# Patient Record
Sex: MALE | Race: WHITE | NOT HISPANIC OR LATINO | URBAN - METROPOLITAN AREA
[De-identification: names, ages, dates, MRNs, and addresses within clinical notes are randomized per-mention and may not be internally consistent; named-entity substitution may affect disease eponyms.]

---

## 2019-10-17 ENCOUNTER — INPATIENT (INPATIENT)
Facility: HOSPITAL | Age: 59
LOS: 4 days | Discharge: HOME | End: 2019-10-22
Attending: INTERNAL MEDICINE | Admitting: INTERNAL MEDICINE
Payer: COMMERCIAL

## 2019-10-17 PROCEDURE — 93010 ELECTROCARDIOGRAM REPORT: CPT

## 2019-10-17 PROCEDURE — 99285 EMERGENCY DEPT VISIT HI MDM: CPT

## 2019-10-18 VITALS
HEART RATE: 90 BPM | OXYGEN SATURATION: 86 % | HEIGHT: 68 IN | RESPIRATION RATE: 22 BRPM | WEIGHT: 184.97 LBS | SYSTOLIC BLOOD PRESSURE: 131 MMHG | DIASTOLIC BLOOD PRESSURE: 94 MMHG | TEMPERATURE: 98 F

## 2019-10-18 DIAGNOSIS — Z29.9 ENCOUNTER FOR PROPHYLACTIC MEASURES, UNSPECIFIED: ICD-10-CM

## 2019-10-18 DIAGNOSIS — R07.9 CHEST PAIN, UNSPECIFIED: ICD-10-CM

## 2019-10-18 DIAGNOSIS — E11.9 TYPE 2 DIABETES MELLITUS WITHOUT COMPLICATIONS: ICD-10-CM

## 2019-10-18 DIAGNOSIS — Z72.0 TOBACCO USE: ICD-10-CM

## 2019-10-18 DIAGNOSIS — J44.1 CHRONIC OBSTRUCTIVE PULMONARY DISEASE WITH (ACUTE) EXACERBATION: ICD-10-CM

## 2019-10-18 DIAGNOSIS — Z98.890 OTHER SPECIFIED POSTPROCEDURAL STATES: Chronic | ICD-10-CM

## 2019-10-18 LAB
ALBUMIN SERPL ELPH-MCNC: 3 G/DL — LOW (ref 3.3–5)
ALP SERPL-CCNC: 54 U/L — SIGNIFICANT CHANGE UP (ref 40–120)
ALT FLD-CCNC: 39 U/L — SIGNIFICANT CHANGE UP (ref 12–78)
ANION GAP SERPL CALC-SCNC: 5 MMOL/L — SIGNIFICANT CHANGE UP (ref 5–17)
AST SERPL-CCNC: 22 U/L — SIGNIFICANT CHANGE UP (ref 15–37)
BASOPHILS # BLD AUTO: 0.04 K/UL — SIGNIFICANT CHANGE UP (ref 0–0.2)
BASOPHILS NFR BLD AUTO: 0.4 % — SIGNIFICANT CHANGE UP (ref 0–2)
BILIRUB SERPL-MCNC: 0.2 MG/DL — SIGNIFICANT CHANGE UP (ref 0.2–1.2)
BUN SERPL-MCNC: 19 MG/DL — SIGNIFICANT CHANGE UP (ref 7–23)
CALCIUM SERPL-MCNC: 8.1 MG/DL — LOW (ref 8.5–10.1)
CHLORIDE SERPL-SCNC: 106 MMOL/L — SIGNIFICANT CHANGE UP (ref 96–108)
CK MB BLD-MCNC: 2.7 % — SIGNIFICANT CHANGE UP (ref 0–3.5)
CK MB CFR SERPL CALC: 1.2 NG/ML — SIGNIFICANT CHANGE UP (ref 0.5–3.6)
CK SERPL-CCNC: 44 U/L — SIGNIFICANT CHANGE UP (ref 26–308)
CO2 SERPL-SCNC: 31 MMOL/L — SIGNIFICANT CHANGE UP (ref 22–31)
CREAT SERPL-MCNC: 0.44 MG/DL — LOW (ref 0.5–1.3)
D DIMER BLD IA.RAPID-MCNC: <150 NG/ML DDU — SIGNIFICANT CHANGE UP
EOSINOPHIL # BLD AUTO: 0.07 K/UL — SIGNIFICANT CHANGE UP (ref 0–0.5)
EOSINOPHIL NFR BLD AUTO: 0.7 % — SIGNIFICANT CHANGE UP (ref 0–6)
GLUCOSE BLDC GLUCOMTR-MCNC: 255 MG/DL — HIGH (ref 70–99)
GLUCOSE BLDC GLUCOMTR-MCNC: 277 MG/DL — HIGH (ref 70–99)
GLUCOSE BLDC GLUCOMTR-MCNC: 278 MG/DL — HIGH (ref 70–99)
GLUCOSE BLDC GLUCOMTR-MCNC: 371 MG/DL — HIGH (ref 70–99)
GLUCOSE SERPL-MCNC: 234 MG/DL — HIGH (ref 70–99)
HCT VFR BLD CALC: 56.8 % — HIGH (ref 39–50)
HGB BLD-MCNC: 17.9 G/DL — HIGH (ref 13–17)
IMM GRANULOCYTES NFR BLD AUTO: 0.4 % — SIGNIFICANT CHANGE UP (ref 0–1.5)
LACTATE SERPL-SCNC: 1.1 MMOL/L — SIGNIFICANT CHANGE UP (ref 0.7–2)
LYMPHOCYTES # BLD AUTO: 19.5 % — SIGNIFICANT CHANGE UP (ref 13–44)
LYMPHOCYTES # BLD AUTO: 2.04 K/UL — SIGNIFICANT CHANGE UP (ref 1–3.3)
MCHC RBC-ENTMCNC: 31.5 GM/DL — LOW (ref 32–36)
MCHC RBC-ENTMCNC: 31.5 PG — SIGNIFICANT CHANGE UP (ref 27–34)
MCV RBC AUTO: 100 FL — SIGNIFICANT CHANGE UP (ref 80–100)
MONOCYTES # BLD AUTO: 0.91 K/UL — HIGH (ref 0–0.9)
MONOCYTES NFR BLD AUTO: 8.7 % — SIGNIFICANT CHANGE UP (ref 2–14)
NEUTROPHILS # BLD AUTO: 7.34 K/UL — SIGNIFICANT CHANGE UP (ref 1.8–7.4)
NEUTROPHILS NFR BLD AUTO: 70.3 % — SIGNIFICANT CHANGE UP (ref 43–77)
NRBC # BLD: 0 /100 WBCS — SIGNIFICANT CHANGE UP (ref 0–0)
NT-PROBNP SERPL-SCNC: 152 PG/ML — HIGH (ref 0–125)
PLATELET # BLD AUTO: 143 K/UL — LOW (ref 150–400)
POTASSIUM SERPL-MCNC: 4.2 MMOL/L — SIGNIFICANT CHANGE UP (ref 3.5–5.3)
POTASSIUM SERPL-SCNC: 4.2 MMOL/L — SIGNIFICANT CHANGE UP (ref 3.5–5.3)
PROT SERPL-MCNC: 6.9 GM/DL — SIGNIFICANT CHANGE UP (ref 6–8.3)
RBC # BLD: 5.68 M/UL — SIGNIFICANT CHANGE UP (ref 4.2–5.8)
RBC # FLD: 13.5 % — SIGNIFICANT CHANGE UP (ref 10.3–14.5)
SODIUM SERPL-SCNC: 142 MMOL/L — SIGNIFICANT CHANGE UP (ref 135–145)
TROPONIN I SERPL-MCNC: <.015 NG/ML — SIGNIFICANT CHANGE UP (ref 0.01–0.04)
TROPONIN I SERPL-MCNC: <.015 NG/ML — SIGNIFICANT CHANGE UP (ref 0.01–0.04)
WBC # BLD: 10.44 K/UL — SIGNIFICANT CHANGE UP (ref 3.8–10.5)
WBC # FLD AUTO: 10.44 K/UL — SIGNIFICANT CHANGE UP (ref 3.8–10.5)

## 2019-10-18 PROCEDURE — 93970 EXTREMITY STUDY: CPT | Mod: 26

## 2019-10-18 PROCEDURE — 99222 1ST HOSP IP/OBS MODERATE 55: CPT

## 2019-10-18 PROCEDURE — 71045 X-RAY EXAM CHEST 1 VIEW: CPT | Mod: 26

## 2019-10-18 RX ORDER — DEXTROSE 50 % IN WATER 50 %
25 SYRINGE (ML) INTRAVENOUS ONCE
Refills: 0 | Status: DISCONTINUED | OUTPATIENT
Start: 2019-10-18 | End: 2019-10-22

## 2019-10-18 RX ORDER — DEXTROSE 50 % IN WATER 50 %
15 SYRINGE (ML) INTRAVENOUS ONCE
Refills: 0 | Status: DISCONTINUED | OUTPATIENT
Start: 2019-10-18 | End: 2019-10-22

## 2019-10-18 RX ORDER — DEXTROSE 50 % IN WATER 50 %
12.5 SYRINGE (ML) INTRAVENOUS ONCE
Refills: 0 | Status: DISCONTINUED | OUTPATIENT
Start: 2019-10-18 | End: 2019-10-22

## 2019-10-18 RX ORDER — INSULIN LISPRO 100/ML
VIAL (ML) SUBCUTANEOUS AT BEDTIME
Refills: 0 | Status: DISCONTINUED | OUTPATIENT
Start: 2019-10-18 | End: 2019-10-22

## 2019-10-18 RX ORDER — SODIUM CHLORIDE 9 MG/ML
1000 INJECTION, SOLUTION INTRAVENOUS
Refills: 0 | Status: DISCONTINUED | OUTPATIENT
Start: 2019-10-18 | End: 2019-10-22

## 2019-10-18 RX ORDER — INFLUENZA VIRUS VACCINE 15; 15; 15; 15 UG/.5ML; UG/.5ML; UG/.5ML; UG/.5ML
0.5 SUSPENSION INTRAMUSCULAR ONCE
Refills: 0 | Status: COMPLETED | OUTPATIENT
Start: 2019-10-18 | End: 2019-10-18

## 2019-10-18 RX ORDER — ALBUTEROL 90 UG/1
1 AEROSOL, METERED ORAL EVERY 4 HOURS
Refills: 0 | Status: DISCONTINUED | OUTPATIENT
Start: 2019-10-18 | End: 2019-10-22

## 2019-10-18 RX ORDER — ASPIRIN/CALCIUM CARB/MAGNESIUM 324 MG
81 TABLET ORAL DAILY
Refills: 0 | Status: DISCONTINUED | OUTPATIENT
Start: 2019-10-19 | End: 2019-10-22

## 2019-10-18 RX ORDER — NITROGLYCERIN 6.5 MG
0.4 CAPSULE, EXTENDED RELEASE ORAL
Refills: 0 | Status: DISCONTINUED | OUTPATIENT
Start: 2019-10-18 | End: 2019-10-22

## 2019-10-18 RX ORDER — NICOTINE POLACRILEX 2 MG
1 GUM BUCCAL DAILY
Refills: 0 | Status: DISCONTINUED | OUTPATIENT
Start: 2019-10-18 | End: 2019-10-22

## 2019-10-18 RX ORDER — INSULIN LISPRO 100/ML
VIAL (ML) SUBCUTANEOUS
Refills: 0 | Status: DISCONTINUED | OUTPATIENT
Start: 2019-10-18 | End: 2019-10-22

## 2019-10-18 RX ORDER — IPRATROPIUM/ALBUTEROL SULFATE 18-103MCG
3 AEROSOL WITH ADAPTER (GRAM) INHALATION EVERY 6 HOURS
Refills: 0 | Status: DISCONTINUED | OUTPATIENT
Start: 2019-10-18 | End: 2019-10-21

## 2019-10-18 RX ORDER — ASPIRIN/CALCIUM CARB/MAGNESIUM 324 MG
325 TABLET ORAL ONCE
Refills: 0 | Status: COMPLETED | OUTPATIENT
Start: 2019-10-18 | End: 2019-10-18

## 2019-10-18 RX ORDER — GLUCAGON INJECTION, SOLUTION 0.5 MG/.1ML
1 INJECTION, SOLUTION SUBCUTANEOUS ONCE
Refills: 0 | Status: DISCONTINUED | OUTPATIENT
Start: 2019-10-18 | End: 2019-10-22

## 2019-10-18 RX ORDER — ENOXAPARIN SODIUM 100 MG/ML
40 INJECTION SUBCUTANEOUS DAILY
Refills: 0 | Status: DISCONTINUED | OUTPATIENT
Start: 2019-10-18 | End: 2019-10-22

## 2019-10-18 RX ORDER — TIOTROPIUM BROMIDE 18 UG/1
1 CAPSULE ORAL; RESPIRATORY (INHALATION) DAILY
Refills: 0 | Status: DISCONTINUED | OUTPATIENT
Start: 2019-10-18 | End: 2019-10-22

## 2019-10-18 RX ORDER — IPRATROPIUM/ALBUTEROL SULFATE 18-103MCG
3 AEROSOL WITH ADAPTER (GRAM) INHALATION ONCE
Refills: 0 | Status: COMPLETED | OUTPATIENT
Start: 2019-10-18 | End: 2019-10-18

## 2019-10-18 RX ADMIN — Medication 3: at 08:18

## 2019-10-18 RX ADMIN — Medication 125 MILLIGRAM(S): at 01:00

## 2019-10-18 RX ADMIN — Medication 5: at 11:16

## 2019-10-18 RX ADMIN — Medication 3 MILLILITER(S): at 23:12

## 2019-10-18 RX ADMIN — Medication 3 MILLILITER(S): at 01:00

## 2019-10-18 RX ADMIN — Medication 40 MILLIGRAM(S): at 21:54

## 2019-10-18 RX ADMIN — Medication 1 PATCH: at 19:23

## 2019-10-18 RX ADMIN — Medication 40 MILLIGRAM(S): at 14:42

## 2019-10-18 RX ADMIN — Medication 3 MILLILITER(S): at 05:06

## 2019-10-18 RX ADMIN — Medication 1 PATCH: at 11:20

## 2019-10-18 RX ADMIN — Medication 3: at 17:08

## 2019-10-18 RX ADMIN — ENOXAPARIN SODIUM 40 MILLIGRAM(S): 100 INJECTION SUBCUTANEOUS at 11:17

## 2019-10-18 RX ADMIN — Medication 1: at 22:24

## 2019-10-18 RX ADMIN — Medication 3 MILLILITER(S): at 12:22

## 2019-10-18 RX ADMIN — Medication 325 MILLIGRAM(S): at 05:23

## 2019-10-18 RX ADMIN — Medication 40 MILLIGRAM(S): at 05:24

## 2019-10-18 RX ADMIN — Medication 3 MILLILITER(S): at 17:25

## 2019-10-18 NOTE — ED ADULT TRIAGE NOTE - CHIEF COMPLAINT QUOTE
Patient reports midsternal chest pain "feels like something is sitting on my chest " since Report received from RN at 7pm. Assessment available on [facility]. will continue to monitor denies SOB hx DM Patient reports midsternal chest pain "feels like something is sitting on my chest " since 7. + tobacco smoker. Denies SOB

## 2019-10-18 NOTE — H&P ADULT - PROBLEM SELECTOR PLAN 2
Solumedrol 40mg IVPB Q8hrs,   Duonebs q6, spiriva, ventolin PRN  Pulm eval by Dr Talavera - likely will need PFTs

## 2019-10-18 NOTE — H&P ADULT - HISTORY OF PRESENT ILLNESS
Patient is a 58 YO M with a PMH of DM and tobacco abuse who presents to ED complaining of chest tightness earlier today.  Patient reports he was watching tv after dinner when he developed substernal chest pain 8/10, nonradational, that lasted about 15-20 minutes and resolved without intervention.  Patient denies any other associated symptoms.  Called EMS and was found to be hypoxic.  Currently, patient SpO2 88% on 3L O2 via NC.  Patient reports smoking for over 30 years, reports he is currently smoking 2 PPD. Denies alcohol and recreational drugs.  NKDA.  Patient denies history of headache, fever, nausea, vomiting, palpitations, abdominal pain, constipation, diarrhea, dysuria, hematuria, melena, or hematochezia.

## 2019-10-18 NOTE — H&P ADULT - PROBLEM SELECTOR PLAN 1
Oxygen 2L by nasal cannula PRN  Nitroglycerine 0.4mg po q5min x 3 doses maximum PRN for recurrent chest pain  ASA 325mg po STAT then 81 mg po qd Troponin level x 2 q 3 hrs

## 2019-10-18 NOTE — H&P ADULT - NSHPLABSRESULTS_GEN_ALL_CORE
17.9   10.44 )-----------( 143      ( 18 Oct 2019 01:03 )             56.8     10-18    142  |  106  |  19  ----------------------------<  234<H>  4.2   |  31  |  0.44<L>    Ca    8.1<L>      18 Oct 2019 01:03    TPro  6.9  /  Alb  3.0<L>  /  TBili  0.2  /  DBili  x   /  AST  22  /  ALT  39  /  AlkPhos  54  10-18      LIVER FUNCTIONS - ( 18 Oct 2019 01:03 )  Alb: 3.0 g/dL / Pro: 6.9 gm/dL / ALK PHOS: 54 U/L / ALT: 39 U/L / AST: 22 U/L / GGT: x               Home Medications:  glyburide-metformin 5 mg-500 mg oral tablet: 1 tab(s) orally 2 times a day (18 Oct 2019 00:53)

## 2019-10-18 NOTE — ED PROVIDER NOTE - CLINICAL SUMMARY MEDICAL DECISION MAKING FREE TEXT BOX
Pt with COPD exacerbation - treated initially in ED - with continued hypoxia despite NC to 91% - will admit for further care with Dr. Paul in AM - tonight Dr. Cardona will admit.

## 2019-10-18 NOTE — ED ADULT NURSE NOTE - OBJECTIVE STATEMENT
pt with sudden onset pressure to chest while watching TV. lasted 15-20 minutes. did not take any medications. pt denied ever having shortness of breath.

## 2019-10-18 NOTE — CHART NOTE - NSCHARTNOTEFT_GEN_A_CORE
on 10/18/19 went to see  pt and discovered that the  patient belongs to dr. robles and that dr. schulz had already seen the patient on 10/18/19 . services will be changed to dr. easton and he [place].

## 2019-10-18 NOTE — H&P ADULT - ASSESSMENT
59M presents for CP, found to be hypoxic.  Significant hx of tobacco use, no formal dx of COPD as of yet.  CXR without infiltrates.  Labs show increased hematocrit, likely from chronic hypoxia.  Will admit for COPD exacerbation.      IMPROVE VTE Individual Risk Assessment          RISK                                                          Points  [  ] Previous VTE                                                3  [  ] Thrombophilia                                             2  [  ] Lower limb paralysis                                    2        (unable to hold up >15 seconds)    [  ] Current Cancer                                             2         (within 6 months)  [  ] Immobilization > 24 hrs                              1  [  ] ICU/CCU stay > 24 hours                            1  [  ] Age > 60                                                    1    IMPROVE VTE Score - 0

## 2019-10-18 NOTE — H&P ADULT - NSHPPHYSICALEXAM_GEN_ALL_CORE
Physical exam:  General: patient in no acute distress, resting comfortably  Cardio: S1/S2 +ve, regular rate and rhythm, no M/G/R  Resp: mild rales bilaterally with mild diffuse wheezes   GI: abdomen soft, nontender, non distended, no guarding, BS +ve x 4  Ext: no significant pedal edema  Neuro: CN 2-12 intact, no significant motor or sensory deficits.

## 2019-10-18 NOTE — CONSULT NOTE ADULT - SUBJECTIVE AND OBJECTIVE BOX
RICKIE JENNINGS [facility]   28 027  1960 DOA 10/18/2019 DR RACHEL MARCH  ALLERGY      nka   CONTACT       Parent  USPS         Initial evaluation/Pulmonary Critical Care consultation requested on  10/18/2019  by Dr March   from Dr Talavera   Patient examined chart reviewed    HOSPITAL ADMISSION   PATIENT CAME  FROM (if information available)      PATIENT RICKIE JENNINGS                          PT DESCRIPTION     This section was excerpted from ER md note but was independently verified by me    · Chief Complaint: The patient is a 59y Male complaining of chest pain.  · HPI Objective Statement: 59 year old male with PMH of DM II , HTN, chronic smoker presenting to ED due to dizziness, chest pain, for past week no fever/chills no productive sputum. Pt states SOB worse with exertion and will get dizziness along with issue.    HIV:    HIV Status:  · Offered: Declined    PAST MEDICAL/SURGICAL/FAMILY/SOCIAL HISTORY:    Past Medical History:  DM (diabetes mellitus).     Past Surgical History:  History of hernia surgery.     Tobacco Usage:  · Tobacco Usage Current every day smoker    ALLERGIES AND HOME MEDICATIONS:   Allergies:        Allergies:  No Known Allergies:     Home Medications:   * Incomplete Medication History as of 18-Oct-2019 00:53 documented in Structured Notes  · glyburide-metformin 5 mg-500 mg oral tablet: Last Dose Taken:  , 1 tab(s) orally 2 times a day    REVIEW OF SYSTEMS:    Review of Systems:  · CONSTITUTIONAL: no fever and no chills.  · EYES: no discharge, no irritation, no pain, no redness, and no visual changes.  · ENMT: Ears: no ear pain and no hearing problems.Nose: no nasal congestion and no nasal drainage.Mouth/Throat: no dysphagia, no hoarseness and no throat pain.Neck: no lumps, no pain, no stiffness and no swollen glands.  · CARDIOVASCULAR: - - -  · Cardiovascular [+]: CHEST PAIN  · RESPIRATORY: - - -  · Respiratory [+]: COUGH, SHORTNESS OF BREATH  · ROS STATEMENT: all other ROS negative except as per HPI    PHYSICAL EXAM:   · CONSTITUTIONAL: - - -  · Appearance: well appearing  · Distress: MILD, secondary to tachypnea to 20s. No accessory muscle use  · Manner: appropriate for situation  · Mentation: awake  · ENMT: Airway patent, Nasal mucosa clear. Mouth with normal mucosa. Throat has no vesicles, no oropharyngeal exudates and uvula is midline.  · EYES: Clear bilaterally, pupils equal, round and reactive to light.  · CARDIAC: Normal rate, regular rhythm.  Heart sounds S1, S2.  No murmurs, rubs or gallops.  · RESPIRATORY: - - -  · Respiratory Distress: mild  · Breath Sounds: WHEEZES, bilateral diffuse wheezing  · Chest Exam: normal  · GASTROINTESTINAL: Abdomen soft, non-tender, no guarding.  · MUSCULOSKELETAL: Spine appears normal, range of motion is not limited, no muscle or joint tenderness  · NEUROLOGICAL: Alert and oriented, no focal deficits, no motor or sensory deficits.  · SKIN: Skin normal color for race, warm, dry and intact. No evidence of rash.                PATIENT RICKIE JENNINGS   VITALS/LABS       10/18/2019 afeb 90 130/70 18 90%  10/18/2019 W 10.4 Hb 17.9 Plt 143 Na 142 K 4.2 CO2 31 Cr .4        PATIENT  RICKIE JENNINGS                          PATIENT DATA   ALLERGY nka   WT      82         BMI      27                                                                                                                                      HEAD OF BED ELEVATION Yes   DVT PROPHYLAXIS lvnx 40 (10/16)   EKG 10/17 nsr    DM iss (10/180                               DIET         reg (10/18)                                                         PATIENT RICKIE JENNINGS                          Pt description                          CC chest pressure Someone sitting on chest                       er vitals 130/90 90 22 afeb     HPI 59 m current 2 ppd smoker PMH Htn DM admitted 10/18/2019 with dizziness chest pain   Pt was noted hypoxemic by ems   Pulm consulted for poss copd     home meds glyburide metformin

## 2019-10-18 NOTE — CONSULT NOTE ADULT - ASSESSMENT
PATIENT RICKIE JENNINGS                          PULMONARY/CRITICAL CARE ASSESSMENT/RECOMMENDATIONS                    SMOKER  nicotine 21 (10/18)   Counseled   RO VTE   10/18/2019 D dimer n   A/R 10/18/2019 Check v duplex  POLYCYTHEMIA   10/18/2019 Hb 17.9   A/R 10/18/2019 Pl have him see me after dc Call   Will need sleep study   If polycythemia persists will need hemat eval   10/18/2019 Check abg to exclude OHS  COPD   Duoneb (10/18)   spiriva (10/18)  solumed 40.3 (10/18)  A/R 10/18/2019 Will wean steroids in am   CHEST PAIN   RO MI   10/18/2019 Tr 1 n.2   ASA 81 (10/18)  RO CHF   10/18/2019 bnp 152   A/R Check echo (10/18)   DM                        TIME SPENT Over 55 minutes aggregate care time spent on encounter; activities included   direct pt care, counseling and/or coordinating care reviewing notes, lab data/ imaging , discussion with multidisciplinary team/ pt /family. Risks, benefits, alternatives  discussed in detail.

## 2019-10-18 NOTE — ED PROVIDER NOTE - OBJECTIVE STATEMENT
59 year old male with PMH of DM II , HTN, chronic smoker presenting to ED due to dizziness, chest pain, for past week no fever/chills no productive sputum. Pt states SOB worse with exertion and will get dizziness along with issue.

## 2019-10-18 NOTE — ED ADULT NURSE NOTE - CHIEF COMPLAINT QUOTE
Patient reports midsternal chest pain "feels like something is sitting on my chest " since Report received from RN at 7pm. Assessment available on [facility]. will continue to monitor denies SOB hx DM

## 2019-10-19 LAB
BASE EXCESS BLDA CALC-SCNC: 6.3 MMOL/L — HIGH (ref -2–2)
BLOOD GAS COMMENTS: SIGNIFICANT CHANGE UP
BLOOD GAS COMMENTS: SIGNIFICANT CHANGE UP
BLOOD GAS SOURCE: SIGNIFICANT CHANGE UP
GLUCOSE BLDC GLUCOMTR-MCNC: 245 MG/DL — HIGH (ref 70–99)
GLUCOSE BLDC GLUCOMTR-MCNC: 256 MG/DL — HIGH (ref 70–99)
GLUCOSE BLDC GLUCOMTR-MCNC: 293 MG/DL — HIGH (ref 70–99)
GLUCOSE BLDC GLUCOMTR-MCNC: 313 MG/DL — HIGH (ref 70–99)
HBA1C BLD-MCNC: 9.4 % — HIGH (ref 4–5.6)
HCO3 BLDA-SCNC: 34 MMOL/L — HIGH (ref 21–29)
HCV AB S/CO SERPL IA: 0.13 S/CO — SIGNIFICANT CHANGE UP (ref 0–0.99)
HCV AB SERPL-IMP: SIGNIFICANT CHANGE UP
HOROWITZ INDEX BLDA+IHG-RTO: 28 — SIGNIFICANT CHANGE UP
PCO2 BLDA: 58 MMHG — HIGH (ref 32–46)
PH BLD: 7.38 — SIGNIFICANT CHANGE UP (ref 7.35–7.45)
PO2 BLDA: 58 MMHG — LOW (ref 74–108)
SAO2 % BLDA: 90 % — LOW (ref 92–96)

## 2019-10-19 RX ADMIN — Medication 3 MILLILITER(S): at 05:29

## 2019-10-19 RX ADMIN — Medication 1 PATCH: at 17:33

## 2019-10-19 RX ADMIN — Medication 40 MILLIGRAM(S): at 13:05

## 2019-10-19 RX ADMIN — Medication 3: at 17:37

## 2019-10-19 RX ADMIN — Medication 81 MILLIGRAM(S): at 11:27

## 2019-10-19 RX ADMIN — Medication 30 MILLIGRAM(S): at 17:38

## 2019-10-19 RX ADMIN — Medication 2: at 07:27

## 2019-10-19 RX ADMIN — Medication 1: at 21:29

## 2019-10-19 RX ADMIN — Medication 3 MILLILITER(S): at 17:10

## 2019-10-19 RX ADMIN — Medication 1 PATCH: at 07:28

## 2019-10-19 RX ADMIN — Medication 3 MILLILITER(S): at 23:30

## 2019-10-19 RX ADMIN — Medication 1 PATCH: at 11:29

## 2019-10-19 RX ADMIN — Medication 40 MILLIGRAM(S): at 05:50

## 2019-10-19 RX ADMIN — Medication 4: at 11:26

## 2019-10-19 RX ADMIN — ENOXAPARIN SODIUM 40 MILLIGRAM(S): 100 INJECTION SUBCUTANEOUS at 11:26

## 2019-10-19 RX ADMIN — Medication 3 MILLILITER(S): at 11:00

## 2019-10-19 RX ADMIN — Medication 1 PATCH: at 11:27

## 2019-10-20 LAB
GLUCOSE BLDC GLUCOMTR-MCNC: 243 MG/DL — HIGH (ref 70–99)
GLUCOSE BLDC GLUCOMTR-MCNC: 261 MG/DL — HIGH (ref 70–99)
GLUCOSE BLDC GLUCOMTR-MCNC: 297 MG/DL — HIGH (ref 70–99)
GLUCOSE BLDC GLUCOMTR-MCNC: 367 MG/DL — HIGH (ref 70–99)

## 2019-10-20 RX ADMIN — Medication 3 MILLILITER(S): at 11:14

## 2019-10-20 RX ADMIN — Medication 30 MILLIGRAM(S): at 05:23

## 2019-10-20 RX ADMIN — Medication 1 PATCH: at 07:54

## 2019-10-20 RX ADMIN — Medication 1 PATCH: at 18:35

## 2019-10-20 RX ADMIN — Medication 1 PATCH: at 11:43

## 2019-10-20 RX ADMIN — Medication 3 MILLILITER(S): at 23:45

## 2019-10-20 RX ADMIN — Medication 20 MILLIGRAM(S): at 16:44

## 2019-10-20 RX ADMIN — Medication 3: at 07:53

## 2019-10-20 RX ADMIN — Medication 1: at 21:45

## 2019-10-20 RX ADMIN — Medication 81 MILLIGRAM(S): at 11:39

## 2019-10-20 RX ADMIN — Medication 5: at 11:39

## 2019-10-20 RX ADMIN — ENOXAPARIN SODIUM 40 MILLIGRAM(S): 100 INJECTION SUBCUTANEOUS at 11:39

## 2019-10-20 RX ADMIN — Medication 2: at 16:44

## 2019-10-20 RX ADMIN — Medication 3 MILLILITER(S): at 17:36

## 2019-10-20 RX ADMIN — Medication 3 MILLILITER(S): at 05:17

## 2019-10-20 RX ADMIN — Medication 1 PATCH: at 11:39

## 2019-10-21 LAB
GLUCOSE BLDC GLUCOMTR-MCNC: 224 MG/DL — HIGH (ref 70–99)
GLUCOSE BLDC GLUCOMTR-MCNC: 239 MG/DL — HIGH (ref 70–99)
GLUCOSE BLDC GLUCOMTR-MCNC: 245 MG/DL — HIGH (ref 70–99)
GLUCOSE BLDC GLUCOMTR-MCNC: 352 MG/DL — HIGH (ref 70–99)

## 2019-10-21 PROCEDURE — 99232 SBSQ HOSP IP/OBS MODERATE 35: CPT

## 2019-10-21 PROCEDURE — 71275 CT ANGIOGRAPHY CHEST: CPT | Mod: 26

## 2019-10-21 RX ORDER — ALBUTEROL 90 UG/1
1 AEROSOL, METERED ORAL
Qty: 0 | Refills: 0 | DISCHARGE
Start: 2019-10-21

## 2019-10-21 RX ORDER — NICOTINE POLACRILEX 2 MG
0 GUM BUCCAL
Qty: 0 | Refills: 0 | DISCHARGE
Start: 2019-10-21

## 2019-10-21 RX ORDER — ASPIRIN/CALCIUM CARB/MAGNESIUM 324 MG
1 TABLET ORAL
Qty: 0 | Refills: 0 | DISCHARGE
Start: 2019-10-21

## 2019-10-21 RX ADMIN — Medication 5: at 11:35

## 2019-10-21 RX ADMIN — Medication 3 MILLILITER(S): at 05:40

## 2019-10-21 RX ADMIN — Medication 20 MILLIGRAM(S): at 05:17

## 2019-10-21 RX ADMIN — Medication 1 PATCH: at 11:39

## 2019-10-21 RX ADMIN — Medication 2: at 08:08

## 2019-10-21 RX ADMIN — Medication 3 MILLILITER(S): at 11:13

## 2019-10-21 RX ADMIN — Medication 1 PATCH: at 11:37

## 2019-10-21 RX ADMIN — ENOXAPARIN SODIUM 40 MILLIGRAM(S): 100 INJECTION SUBCUTANEOUS at 11:36

## 2019-10-21 RX ADMIN — Medication 81 MILLIGRAM(S): at 11:34

## 2019-10-21 RX ADMIN — Medication 2: at 17:43

## 2019-10-21 RX ADMIN — Medication 1 PATCH: at 21:52

## 2019-10-21 NOTE — DISCHARGE NOTE PROVIDER - HOSPITAL COURSE
Patient admitted for acute exacerbation of COPD. He was started on IV steroids and Neb meds. His workup included a D-Dimer, Echo and CXR which were unhelpful. His steroids were tapered to PO and now is doing well. Patient will need to continue oral steroids for a few days and will be seen as OPT. Final DX is acute asthmatic bronchitis, with T2DM, HTN and OA.

## 2019-10-21 NOTE — DIETITIAN INITIAL EVALUATION ADULT. - PERTINENT LABORATORY DATA
10-18 Alb 3.0 g/dL<L> 10-19 [id number] 9.4 %<H>10-18 ALT 39 U/L AST 22 U/L Alkaline Phosphatase 54 U/L

## 2019-10-21 NOTE — DIETITIAN INITIAL EVALUATION ADULT. - OTHER INFO
Pt lived alone, did own shopping/cooking.  Pt was on glyburide metformin PTA, did not self monitor blood glucose regularly. Pt stated that he didn't need to check blood sugars regularly as he ate right and took his medication.  Pt stated that he is aware when his blood glucose are not in control.  Pt unaware of HbA1/GC% PTA, was not under the care of Endocrinologist PTA.   Pt demonstrated knowledge deficit and misconceptions regarding CHO foods and allowable foods in diet.  Pt educated on meal planning c plate method and blood glucose goals.

## 2019-10-21 NOTE — PROGRESS NOTE ADULT - ASSESSMENT
58 yo male with DM admitted with chest pain, found to have hypoxemic hypercapnic respiratory failure in the setting of suspected COPD.   -- patient will need home O2 set up - saturating 87% on RA at rest - PT evaluation  -- chronic well compensated hypercapnic respiratory failure - will repeat ABG, will likely need NIV  -- he has dry crackles on exam, I am concerned for underlying ILD - will obtain CT chest with contrast  -- respiratory to obtain bedside spirometry  -- would d/c steroids after tomorrow  -- d/c Avi, ok to continue Spiriva    Thank you for this consult.  Please call my cell with any questions.     Candice Barboza MD  Cell: [phone]    [facility]//Pulmonary Medicine  [facility]; [place] [address]  tel: [phone]; fax: [phone] 58 yo male with DM, current smoker (1.5-2 ppd x 30 years) admitted with chest pain, found to have hypoxemic hypercapnic respiratory failure in the setting of suspected COPD.   -- patient will need home O2 set up - saturating 87% on RA at rest - PT evaluation  -- chronic well compensated hypercapnic respiratory failure - will repeat ABG, will likely need NIV  -- he has dry crackles on exam, I am concerned for underlying ILD - will obtain CT chest with contrast  -- respiratory to obtain bedside spirometry  -- would d/c steroids tomorrow  -- d/c Duonebs, ok to continue Spiriva  -- needs better glucose control    d/w Dr Paul    Thank you for this consult.  Please call my cell with any questions.     Candice Barboza MD  Cell: [phone]    [facility]//Pulmonary Medicine  [facility]; [place] [address]  tel: [phone]; fax: [phone] 60 yo male with DM, current smoker (1.5-2 ppd x 30 years) admitted with chest pain, found to have hypoxemic hypercapnic respiratory failure of unclear etiology (?COPD vs ILD)  -- given erythrocytosis I suspect chronic hypoxemia (Hgb 17.9/Hct 56 on admission)   -- patient will need home O2 set up - saturating 87% on RA at rest - PT evaluation  -- chronic well compensated hypercapnic respiratory failure - will repeat ABG, will likely need NIV  -- he has dry crackles on exam, I am concerned for underlying ILD - will obtain CT chest with contrast  -- respiratory to obtain bedside spirometry  -- would d/c steroids tomorrow  -- d/c Duonebs, ok to continue Spiriva  -- needs better glucose control    d/w Dr Paul    Thank you for this consult.  Please call my cell with any questions.     Candice Barboza MD  Cell: [phone]    [facility]//Pulmonary Medicine  [facility]; [place] [address]  tel: [phone]; fax: [phone]

## 2019-10-21 NOTE — DIETITIAN INITIAL EVALUATION ADULT. - PERTINENT MEDS FT
MEDICATIONS  (STANDING):  ALBUTerol    90 MICROgram(s) HFA Inhaler 1 Puff(s) Inhalation every 4 hours  ALBUTerol/ipratropium for Nebulization 3 milliLiter(s) Nebulizer every 6 hours  aspirin  chewable 81 milliGRAM(s) Oral daily  dextrose 5%. 1000 milliLiter(s) (50 mL/Hr) IV Continuous <Continuous>  dextrose 50% Injectable 12.5 Gram(s) IV Push once  dextrose 50% Injectable 25 Gram(s) IV Push once  dextrose 50% Injectable 25 Gram(s) IV Push once  enoxaparin Injectable 40 milliGRAM(s) SubCutaneous daily  influenza   Vaccine 0.5 milliLiter(s) IntraMuscular once  insulin lispro (HumaLOG) corrective regimen sliding scale   SubCutaneous at bedtime  insulin lispro (HumaLOG) corrective regimen sliding scale   SubCutaneous three times a day before meals  nicotine - 21 mG/24Hr(s) Patch 1 patch Transdermal daily  predniSONE   Tablet 20 milliGRAM(s) Oral daily  tiotropium 18 MICROgram(s) Capsule 1 Capsule(s) Inhalation daily    MEDICATIONS  (PRN):  dextrose 40% Gel 15 Gram(s) Oral once PRN Blood Glucose LESS THAN 70 milliGRAM(s)/deciliter  glucagon  Injectable 1 milliGRAM(s) IntraMuscular once PRN Glucose LESS THAN 70 milligrams/deciliter  nitroglycerin     SubLingual 0.4 milliGRAM(s) SubLingual every 5 minutes PRN Chest Pain

## 2019-10-21 NOTE — DIETITIAN INITIAL EVALUATION ADULT. - ENERGY NEEDS
Height (cm): 172.7 (10-18)  Weight (kg): 82.2 (10-18)  BMI (kg/m2): 27.6 (10-18)  IBW: 69.8 kg         % IBW:  117%           UBW: 83.9 kg              %UBW: 98%, wt. gain of 1.5 kg since adm noted, ? wt. accuracy

## 2019-10-21 NOTE — DISCHARGE NOTE PROVIDER - NSDCCPCAREPLAN_GEN_ALL_CORE_FT
PRINCIPAL DISCHARGE DIAGNOSIS  Diagnosis: COPD exacerbation  Assessment and Plan of Treatment:       SECONDARY DISCHARGE DIAGNOSES  Diagnosis: Chest pain  Assessment and Plan of Treatment:

## 2019-10-22 VITALS
TEMPERATURE: 98 F | RESPIRATION RATE: 18 BRPM | HEART RATE: 87 BPM | DIASTOLIC BLOOD PRESSURE: 65 MMHG | OXYGEN SATURATION: 93 % | SYSTOLIC BLOOD PRESSURE: 122 MMHG

## 2019-10-22 LAB
BASE EXCESS BLDA CALC-SCNC: 6.8 MMOL/L — HIGH (ref -2–2)
BLOOD GAS COMMENTS: SIGNIFICANT CHANGE UP
BLOOD GAS COMMENTS: SIGNIFICANT CHANGE UP
BLOOD GAS SOURCE: SIGNIFICANT CHANGE UP
GLUCOSE BLDC GLUCOMTR-MCNC: 234 MG/DL — HIGH (ref 70–99)
GLUCOSE BLDC GLUCOMTR-MCNC: 263 MG/DL — HIGH (ref 70–99)
GLUCOSE BLDC GLUCOMTR-MCNC: 323 MG/DL — HIGH (ref 70–99)
HCO3 BLDA-SCNC: 32 MMOL/L — HIGH (ref 21–29)
HOROWITZ INDEX BLDA+IHG-RTO: 32 — SIGNIFICANT CHANGE UP
PCO2 BLDA: 47 MMHG — HIGH (ref 32–46)
PH BLD: 7.45 — SIGNIFICANT CHANGE UP (ref 7.35–7.45)
PO2 BLDA: 60 MMHG — LOW (ref 74–108)
SAO2 % BLDA: 90 % — LOW (ref 92–96)

## 2019-10-22 PROCEDURE — 99232 SBSQ HOSP IP/OBS MODERATE 35: CPT

## 2019-10-22 RX ORDER — NICOTINE POLACRILEX 2 MG
1 GUM BUCCAL
Qty: 1 | Refills: 0
Start: 2019-10-22 | End: 2019-11-04

## 2019-10-22 RX ORDER — ALBUTEROL 90 UG/1
1 AEROSOL, METERED ORAL
Qty: 1 | Refills: 0
Start: 2019-10-22 | End: 2019-11-20

## 2019-10-22 RX ADMIN — Medication 3: at 11:18

## 2019-10-22 RX ADMIN — Medication 20 MILLIGRAM(S): at 05:12

## 2019-10-22 RX ADMIN — Medication 4: at 08:33

## 2019-10-22 RX ADMIN — Medication 81 MILLIGRAM(S): at 11:22

## 2019-10-22 RX ADMIN — Medication 1 PATCH: at 11:19

## 2019-10-22 RX ADMIN — Medication 1 PATCH: at 11:18

## 2019-10-22 RX ADMIN — ENOXAPARIN SODIUM 40 MILLIGRAM(S): 100 INJECTION SUBCUTANEOUS at 11:21

## 2019-10-22 RX ADMIN — Medication 1 PATCH: at 07:36

## 2019-10-22 RX ADMIN — Medication 2: at 16:46

## 2019-10-22 NOTE — PROGRESS NOTE ADULT - SUBJECTIVE AND OBJECTIVE BOX
Interval Events:  Patient seen and examined at bedside. Feels well. Wants to go home  Repeat ABG with improved hypercapnia.   CTA with no PE, no parenchymal lung disease.    REVIEW OF SYSTEMS:  Constitutional: no fevers  CV:  no chest pain  Resp:  no SOB  GI: no abd pain    [x ] All other systems negative  [ ] Unable to assess ROS because ________    OBJECTIVE:  T(C): 36.4 (10-22-19 @ 11:37), Max: 36.4 (10-21-19 @ 23:41)  HR: 87 (10-22-19 @ 11:37) (75 - 87)  BP: 122/65 (10-22-19 @ 11:37) (122/65 - 144/87)  RR: 18 (10-22-19 @ 11:37) (17 - 23)  SpO2: 93% (10-22-19 @ 11:37) (85% - 93%)      10-21-19 @ 07:01  -  10-22-19 @ 07:00  --------------------------------------------------------  IN: 807 mL / OUT: 0 mL / NET: 807 mL      PHYSICAL EXAM:  General: Well appearing Male. No apparent distress  HEENT:   Mucous membranes are moist.  Neck: Supple. No JVD  Respiratory: few crackles at bases  Cardiovascular: RRR, no m/r/g  Abdomen: Soft, non-tender, non-distended.   Extremities: No lower extremity edema.  Skin: Warm, dry, no rash.   Neurological: CNII-XII grossly intact.   Psychiatry: appropriate mood and affect.     HOSPITAL MEDICATIONS:  MEDICATIONS  (STANDING):  ALBUTerol    90 MICROgram(s) HFA Inhaler 1 Puff(s) Inhalation every 4 hours  aspirin  chewable 81 milliGRAM(s) Oral daily  dextrose 5%. 1000 milliLiter(s) (50 mL/Hr) IV Continuous <Continuous>  dextrose 50% Injectable 12.5 Gram(s) IV Push once  dextrose 50% Injectable 25 Gram(s) IV Push once  dextrose 50% Injectable 25 Gram(s) IV Push once  enoxaparin Injectable 40 milliGRAM(s) SubCutaneous daily  influenza   Vaccine 0.5 milliLiter(s) IntraMuscular once  insulin lispro (HumaLOG) corrective regimen sliding scale   SubCutaneous at bedtime  insulin lispro (HumaLOG) corrective regimen sliding scale   SubCutaneous three times a day before meals  nicotine - 21 mG/24Hr(s) Patch 1 patch Transdermal daily  predniSONE   Tablet 20 milliGRAM(s) Oral daily  tiotropium 18 MICROgram(s) Capsule 1 Capsule(s) Inhalation daily    MEDICATIONS  (PRN):  dextrose 40% Gel 15 Gram(s) Oral once PRN Blood Glucose LESS THAN 70 milliGRAM(s)/deciliter  glucagon  Injectable 1 milliGRAM(s) IntraMuscular once PRN Glucose LESS THAN 70 milligrams/deciliter  nitroglycerin     SubLingual 0.4 milliGRAM(s) SubLingual every 5 minutes PRN Chest Pain      LABS:    Arterial Blood Gas:  10-22 @ 08:45  7.45/47/60/32/90/6.8  ABG lactate: --      RADIOLOGY:  [ x ] Reviewed and interpreted by me    CXR - clear lungs  LE dopplers - negative    < from: TTE Echo Doppler w/o Cont (10.18.19 @ 13:01) >  Left Ventricle: Normal left ventricular size and wall thicknesses, with   normal systolic and diastolic function.  Left ventricular ejection fraction, by visual estimation, is 60 to 65%.  Right Ventricle: Normal right ventricular size and function.  Left Atrium: The left atrium is normal in size. Normal left atrial size.  Right Atrium: The right atrium is normal in size. Normal right atrial   size.  Pericardium: There is no evidence of pericardial effusion.  Mitral Valve: Structurally normal mitral valve, with normal leaflet   excursion. The mitral valve is normal in structure. No evidence of mitral   valve regurgitation is seen.  Tricuspid Valve: Structurally normal tricuspid valve, with normal leaflet   excursion. The tricuspid valve is normal in structure. No tricuspid   regurgitation is visualized.  Aortic Valve: Normal trileaflet aortic valve with normal opening. The   aortic valve is normal. Peak transaortic gradient equals 11.3 mmHg, mean   transaortic gradient equals 6.0 mmHg, the calculated aortic valve area   equals 2.78 cm² by the continuity equation consistent with normally   opening aortic valve. No evidence of aortic valve regurgitation is seen.  Pulmonic Valve: Structurally normal pulmonic valve, with normal leaflet   excursion. The pulmonic valve is normal. No indication of pulmonic valve   regurgitation.  Aorta: The aortic root and ascending aorta are structurally normal, with   no evidence of dilitation.  Pulmonary Artery: The main pulmonary artery is normal in size.    < end of copied text >    < from: CT Angio Chest w/ IV Cont (10.21.19 @ 17:03) >  IMPRESSION:     No evidence of acute pulmonary embolism.    Subsegmental atelectasis in the right lower lobe.    < end of copied text >
58 yo man improving on meds.    Vital Signs Last 24 Hrs  T(C): 36.6 (19 Oct 2019 16:26), Max: 36.6 (18 Oct 2019 23:29)  T(F): 97.8 (19 Oct 2019 16:26), Max: 97.9 (18 Oct 2019 23:29)  HR: 88 (19 Oct 2019 16:26) (75 - 88)  BP: 119/77 (19 Oct 2019 16:26) (103/74 - 129/69)  BP(mean): --  RR: 19 (19 Oct 2019 16:26) (16 - 19)  SpO2: 95% (19 Oct 2019 16:26) (90% - 96%)                          17.9   10.44 )-----------( 143      ( 18 Oct 2019 01:03 )             56.8       10-18    142  |  106  |  19  ----------------------------<  234<H>  4.2   |  31  |  0.44<L>    Ca    8.1<L>      18 Oct 2019 01:03    TPro  6.9  /  Alb  3.0<L>  /  TBili  0.2  /  DBili  x   /  AST  22  /  ALT  39  /  AlkPhos  54  10-18    MEDICATIONS  (STANDING):  ALBUTerol    90 MICROgram(s) HFA Inhaler 1 Puff(s) Inhalation every 4 hours  ALBUTerol/ipratropium for Nebulization 3 milliLiter(s) Nebulizer every 6 hours  aspirin  chewable 81 milliGRAM(s) Oral daily  dextrose 5%. 1000 milliLiter(s) (50 mL/Hr) IV Continuous <Continuous>  dextrose 50% Injectable 12.5 Gram(s) IV Push once  dextrose 50% Injectable 25 Gram(s) IV Push once  dextrose 50% Injectable 25 Gram(s) IV Push once  enoxaparin Injectable 40 milliGRAM(s) SubCutaneous daily  influenza   Vaccine 0.5 milliLiter(s) IntraMuscular once  insulin lispro (HumaLOG) corrective regimen sliding scale   SubCutaneous at bedtime  insulin lispro (HumaLOG) corrective regimen sliding scale   SubCutaneous three times a day before meals  methylPREDNISolone sodium succinate Injectable 30 milliGRAM(s) IV Push two times a day  nicotine - 21 mG/24Hr(s) Patch 1 patch Transdermal daily  tiotropium 18 MICROgram(s) Capsule 1 Capsule(s) Inhalation daily    MEDICATIONS  (PRN):  dextrose 40% Gel 15 Gram(s) Oral once PRN Blood Glucose LESS THAN 70 milliGRAM(s)/deciliter  glucagon  Injectable 1 milliGRAM(s) IntraMuscular once PRN Glucose LESS THAN 70 milligrams/deciliter  nitroglycerin     SubLingual 0.4 milliGRAM(s) SubLingual every 5 minutes PRN Chest Pain
Interval Events:  Patient seen and examined.   Saturating 93% on 3L NC.       REVIEW OF SYSTEMS:  Constitutional: no fevers  CV:  initially presented with chest pain - resolved  Resp:  denies SOB, cough  GI: no abd pain  [x ] All other systems negative  [ ] Unable to assess ROS because ________    OBJECTIVE:  T(C): 36.2 (10-21-19 @ 12:17), Max: 36.5 (10-20-19 @ 23:45)  HR: 82 (10-21-19 @ 12:17) (68 - 92)  BP: 137/76 (10-21-19 @ 12:17) (128/70 - 137/76)  RR: 18 (10-21-19 @ 12:17) (17 - 18)  SpO2: 93% (10-21-19 @ 12:17) (90% - 95%)  87% on RA       10-20-19 @ 07:01  -  10-21-19 @ 07:00  --------------------------------------------------------  IN: 637 mL / OUT: 0 mL / NET: 637 mL        PHYSICAL EXAM:  General: Well appearing Male. No apparent distress  HEENT:   Mucous membranes are moist.  Neck: Supple. No JVD  Respiratory: bibasilar dry crackles  Cardiovascular: RRR, no m/r/g  Abdomen: Soft, non-tender, non-distended.   Extremities: No lower extremity edema.  Skin: Warm, dry, no rash.   Neurological: CNII-XII grossly intact.   Psychiatry: appropriate mood and affect.     HOSPITAL MEDICATIONS:  MEDICATIONS  (STANDING):  ALBUTerol    90 MICROgram(s) HFA Inhaler 1 Puff(s) Inhalation every 4 hours  ALBUTerol/ipratropium for Nebulization 3 milliLiter(s) Nebulizer every 6 hours  aspirin  chewable 81 milliGRAM(s) Oral daily  dextrose 5%. 1000 milliLiter(s) (50 mL/Hr) IV Continuous <Continuous>  dextrose 50% Injectable 12.5 Gram(s) IV Push once  dextrose 50% Injectable 25 Gram(s) IV Push once  dextrose 50% Injectable 25 Gram(s) IV Push once  enoxaparin Injectable 40 milliGRAM(s) SubCutaneous daily  influenza   Vaccine 0.5 milliLiter(s) IntraMuscular once  insulin lispro (HumaLOG) corrective regimen sliding scale   SubCutaneous at bedtime  insulin lispro (HumaLOG) corrective regimen sliding scale   SubCutaneous three times a day before meals  nicotine - 21 mG/24Hr(s) Patch 1 patch Transdermal daily  predniSONE   Tablet 20 milliGRAM(s) Oral daily  tiotropium 18 MICROgram(s) Capsule 1 Capsule(s) Inhalation daily    MEDICATIONS  (PRN):  dextrose 40% Gel 15 Gram(s) Oral once PRN Blood Glucose LESS THAN 70 milliGRAM(s)/deciliter  glucagon  Injectable 1 milliGRAM(s) IntraMuscular once PRN Glucose LESS THAN 70 milligrams/deciliter  nitroglycerin     SubLingual 0.4 milliGRAM(s) SubLingual every 5 minutes PRN Chest Pain      LABS:    ABG on admission: 7.38/58/58    troponin - neg x 2    RADIOLOGY:  [ x ] Reviewed and interpreted by me    CXR - clear lungs  LE dopplers - negative    < from: TTE Echo Doppler w/o Cont (10.18.19 @ 13:01) >  Left Ventricle: Normal left ventricular size and wall thicknesses, with   normal systolic and diastolic function.  Left ventricular ejection fraction, by visual estimation, is 60 to 65%.  Right Ventricle: Normal right ventricular size and function.  Left Atrium: The left atrium is normal in size. Normal left atrial size.  Right Atrium: The right atrium is normal in size. Normal right atrial   size.  Pericardium: There is no evidence of pericardial effusion.  Mitral Valve: Structurally normal mitral valve, with normal leaflet   excursion. The mitral valve is normal in structure. No evidence of mitral   valve regurgitation is seen.  Tricuspid Valve: Structurally normal tricuspid valve, with normal leaflet   excursion. The tricuspid valve is normal in structure. No tricuspid   regurgitation is visualized.  Aortic Valve: Normal trileaflet aortic valve with normal opening. The   aortic valve is normal. Peak transaortic gradient equals 11.3 mmHg, mean   transaortic gradient equals 6.0 mmHg, the calculated aortic valve area   equals 2.78 cm² by the continuity equation consistent with normally   opening aortic valve. No evidence of aortic valve regurgitation is seen.  Pulmonic Valve: Structurally normal pulmonic valve, with normal leaflet   excursion. The pulmonic valve is normal. No indication of pulmonic valve   regurgitation.  Aorta: The aortic root and ascending aorta are structurally normal, with   no evidence of dilitation.  Pulmonary Artery: The main pulmonary artery is normal in size.    < end of copied text >
58 yo man with COPD improving slowly.

## 2019-10-22 NOTE — CHART NOTE - NSCHARTNOTEFT_GEN_A_CORE
[facility]       To Whom It May Concern,     MICHAELA DAMIAN was admitted on 10/18/2019 , treated and discharged on 10/22/2019 from [facility].  Patient needs to see Primary Care MD for clearance to return to work.     If any questions or concerns please call.       Thank you,   Dianelys Nam  [phone]  Beeper # [id number]

## 2019-10-22 NOTE — DISCHARGE NOTE NURSING/CASE MANAGEMENT/SOCIAL WORK - NSDCPEWEB_GEN_ALL_CORE
[place] website --- [email or web address] for Tobacco [email or web address] website --- [email or web address]

## 2019-10-22 NOTE — DISCHARGE NOTE NURSING/CASE MANAGEMENT/SOCIAL WORK - NSDCPEEMAIL_GEN_ALL_CORE
[facility] for Tobacco Control email [email or web address]

## 2019-10-22 NOTE — PROGRESS NOTE ADULT - ASSESSMENT
58 yo male with DM, current smoker (1.5-2 ppd x 30 years) admitted with chest pain, found to be hypoxemic with likely secondary polycythemia (Hgb 17.9/Hct 56 on admission)   Etiology of hypoxemic respiratory failure not entirely clear to me - there is no e/o PE, no significant parenchymal lung disease, no e/o emphysema.  -- would recommend ECHO with bubble to evaluate for PFO - this can be done as outpatient as patient would really like to go home  -- needs home O2 - being set up by primary team  -- repeat ABG with improved Hypercapnia  -- last day of steroids today  -- c/w Spiriva. Albuterol prn  -- needs complete PFTs and outpatient pulmonary follow up      Thank you for this consult.  Please call my cell with any questions.     Candice Barboza MD  Cell: [phone]    [facility]//Pulmonary Medicine  [facility]; [place] [address]  tel: [phone]; fax: [phone]

## 2019-10-22 NOTE — DISCHARGE NOTE NURSING/CASE MANAGEMENT/SOCIAL WORK - PATIENT PORTAL LINK FT
You can access the FollowMyHealth Patient Portal offered by [facility] by registering at the following website: [email or web address]. By joining [employer]’s FollowMyHealth portal, you will also be able to view your health information using other applications (apps) compatible with our system.

## 2019-10-23 LAB
CULTURE RESULTS: SIGNIFICANT CHANGE UP
CULTURE RESULTS: SIGNIFICANT CHANGE UP
SPECIMEN SOURCE: SIGNIFICANT CHANGE UP
SPECIMEN SOURCE: SIGNIFICANT CHANGE UP

## 2019-10-26 DIAGNOSIS — F17.210 NICOTINE DEPENDENCE, CIGARETTES, UNCOMPLICATED: ICD-10-CM

## 2019-10-26 DIAGNOSIS — J44.1 CHRONIC OBSTRUCTIVE PULMONARY DISEASE WITH (ACUTE) EXACERBATION: ICD-10-CM

## 2019-10-26 DIAGNOSIS — J45.901 UNSPECIFIED ASTHMA WITH (ACUTE) EXACERBATION: ICD-10-CM

## 2019-10-26 DIAGNOSIS — I10 ESSENTIAL (PRIMARY) HYPERTENSION: ICD-10-CM

## 2019-10-26 DIAGNOSIS — M19.90 UNSPECIFIED OSTEOARTHRITIS, UNSPECIFIED SITE: ICD-10-CM

## 2019-10-26 DIAGNOSIS — J96.91 RESPIRATORY FAILURE, UNSPECIFIED WITH HYPOXIA: ICD-10-CM

## 2019-10-26 DIAGNOSIS — E11.9 TYPE 2 DIABETES MELLITUS WITHOUT COMPLICATIONS: ICD-10-CM

## 2019-10-26 DIAGNOSIS — D75.1 SECONDARY POLYCYTHEMIA: ICD-10-CM

## 2019-10-26 DIAGNOSIS — R07.9 CHEST PAIN, UNSPECIFIED: ICD-10-CM

## 2019-10-26 DIAGNOSIS — J96.92 RESPIRATORY FAILURE, UNSPECIFIED WITH HYPERCAPNIA: ICD-10-CM

## 2019-10-26 DIAGNOSIS — Z79.84 LONG TERM (CURRENT) USE OF ORAL HYPOGLYCEMIC DRUGS: ICD-10-CM

## 2020-02-15 ENCOUNTER — EMERGENCY (EMERGENCY)
Facility: HOSPITAL | Age: 60
LOS: 0 days | Discharge: HOME | End: 2020-02-15
Payer: COMMERCIAL

## 2020-02-15 VITALS
SYSTOLIC BLOOD PRESSURE: 123 MMHG | RESPIRATION RATE: 18 BRPM | HEART RATE: 92 BPM | HEIGHT: 68 IN | TEMPERATURE: 98 F | WEIGHT: 188.94 LBS | OXYGEN SATURATION: 93 % | DIASTOLIC BLOOD PRESSURE: 78 MMHG

## 2020-02-15 DIAGNOSIS — Z79.82 LONG TERM (CURRENT) USE OF ASPIRIN: ICD-10-CM

## 2020-02-15 DIAGNOSIS — J44.9 CHRONIC OBSTRUCTIVE PULMONARY DISEASE, UNSPECIFIED: ICD-10-CM

## 2020-02-15 DIAGNOSIS — E11.9 TYPE 2 DIABETES MELLITUS WITHOUT COMPLICATIONS: ICD-10-CM

## 2020-02-15 DIAGNOSIS — M25.511 PAIN IN RIGHT SHOULDER: ICD-10-CM

## 2020-02-15 DIAGNOSIS — Z79.52 LONG TERM (CURRENT) USE OF SYSTEMIC STEROIDS: ICD-10-CM

## 2020-02-15 DIAGNOSIS — Z98.890 OTHER SPECIFIED POSTPROCEDURAL STATES: Chronic | ICD-10-CM

## 2020-02-15 DIAGNOSIS — Z79.84 LONG TERM (CURRENT) USE OF ORAL HYPOGLYCEMIC DRUGS: ICD-10-CM

## 2020-02-15 PROCEDURE — 99284 EMERGENCY DEPT VISIT MOD MDM: CPT

## 2020-02-15 PROCEDURE — 73030 X-RAY EXAM OF SHOULDER: CPT | Mod: 26,RT

## 2020-02-15 RX ORDER — RABIES VACC, HUMAN DIPLOID/PF 2.5 UNIT
1 VIAL (EA) INTRAMUSCULAR ONCE
Refills: 0 | Status: DISCONTINUED | OUTPATIENT
Start: 2020-02-15 | End: 2020-02-15

## 2020-02-15 NOTE — ED PROVIDER NOTE - CLINICAL SUMMARY MEDICAL DECISION MAKING FREE TEXT BOX
R shoulder pain and slightly limited ROM s/p fall 5 day ago. Possible avulsion fx at the greater tubercle. Arm sling. Declined pain meds. f/u with Ortho

## 2020-02-15 NOTE — ED PROVIDER NOTE - PATIENT PORTAL LINK FT
You can access the FollowMyHealth Patient Portal offered by [facility] by registering at the following website: [email or web address]. By joining [employer]’s FollowMyHealth portal, you will also be able to view your health information using other applications (apps) compatible with our system.

## 2020-02-15 NOTE — ED PROVIDER NOTE - PHYSICAL EXAMINATION
R shoulder- no swelling, no ecchymosis. mild posterior tenderness below spine of scapula. no bony tenderness, no TTP clavicle or AC jt. slightly limited ext/internal rotation. 5/5 mus strength b/l UE. NAD, talks in full, clear sentences, no resp distress. lungs CTA b/l, no wheezing, no rhonchi. R shoulder- no swelling, no ecchymosis. mild posterior tenderness below spine of scapula. no bony tenderness, no TTP clavicle or AC jt. slightly limited ext/internal rotation. 5/5 mus strength b/l UE.

## 2020-02-15 NOTE — ED ADULT TRIAGE NOTE - CHIEF COMPLAINT QUOTE
Tripped and fall over boxes in a store on Monday, pain across lower back, right shoulder pains. h/o home o2 at 2.5 liters, copd, smoker

## 2020-02-15 NOTE — ED PROVIDER NOTE - DIAGNOSTIC INTERPRETATION
R shoulder- possible tiny avulsion fx at the greater tubercle. calcification? at the lower end of the glenoid.

## 2020-02-15 NOTE — ED PROVIDER NOTE - OBJECTIVE STATEMENT
60 y/o M with DM, COPD, c/o R shoulder pain and limited internal and external rotation s/p fall 5 days ago in a deli. Pt state that he tripped over boxes that was on the floor after he turned around. reports that the limitation in ROM varies from day to day. Did not take anything for pain, does not believe in them as per pt. Pt had seen his PMD, who said to take a day and see how he felt. Denies HS, LOC, dizziness, CP, SOB, numbness. Pt is RHD, works in the [employer].

## 2022-05-08 ENCOUNTER — INPATIENT (INPATIENT)
Facility: HOSPITAL | Age: 62
LOS: 7 days | Discharge: HOME | End: 2022-05-16
Attending: INTERNAL MEDICINE | Admitting: INTERNAL MEDICINE
Payer: COMMERCIAL

## 2022-05-08 VITALS
SYSTOLIC BLOOD PRESSURE: 127 MMHG | WEIGHT: 177.91 LBS | RESPIRATION RATE: 30 BRPM | OXYGEN SATURATION: 70 % | HEIGHT: 68 IN | HEART RATE: 119 BPM | DIASTOLIC BLOOD PRESSURE: 79 MMHG

## 2022-05-08 DIAGNOSIS — Z98.890 OTHER SPECIFIED POSTPROCEDURAL STATES: Chronic | ICD-10-CM

## 2022-05-08 PROCEDURE — 99291 CRITICAL CARE FIRST HOUR: CPT

## 2022-05-08 NOTE — ED ADULT TRIAGE NOTE - CHIEF COMPLAINT QUOTE
pt presents to the ED c/o difficulty breathing, SP02 stating @70s on RA. pt presents to the ED c/o difficulty breathing started on friday and getting worse, SP02 stating @70s on RA.

## 2022-05-09 DIAGNOSIS — J44.1 CHRONIC OBSTRUCTIVE PULMONARY DISEASE WITH (ACUTE) EXACERBATION: ICD-10-CM

## 2022-05-09 DIAGNOSIS — I10 ESSENTIAL (PRIMARY) HYPERTENSION: ICD-10-CM

## 2022-05-09 DIAGNOSIS — J12.9 VIRAL PNEUMONIA, UNSPECIFIED: ICD-10-CM

## 2022-05-09 DIAGNOSIS — E11.9 TYPE 2 DIABETES MELLITUS WITHOUT COMPLICATIONS: ICD-10-CM

## 2022-05-09 DIAGNOSIS — A41.9 SEPSIS, UNSPECIFIED ORGANISM: ICD-10-CM

## 2022-05-09 PROBLEM — J44.9 CHRONIC OBSTRUCTIVE PULMONARY DISEASE, UNSPECIFIED: Chronic | Status: ACTIVE | Noted: 2020-02-15

## 2022-05-09 LAB
ALBUMIN SERPL ELPH-MCNC: 3.1 G/DL — LOW (ref 3.3–5)
ALP SERPL-CCNC: 56 U/L — SIGNIFICANT CHANGE UP (ref 40–120)
ALT FLD-CCNC: 34 U/L — SIGNIFICANT CHANGE UP (ref 12–78)
ANION GAP SERPL CALC-SCNC: 8 MMOL/L — SIGNIFICANT CHANGE UP (ref 5–17)
APTT BLD: 37 SEC — HIGH (ref 27.5–35.5)
AST SERPL-CCNC: 43 U/L — HIGH (ref 15–37)
BASE EXCESS BLDA CALC-SCNC: 5.7 MMOL/L — HIGH (ref -2–3)
BASOPHILS # BLD AUTO: 0.05 K/UL — SIGNIFICANT CHANGE UP (ref 0–0.2)
BASOPHILS NFR BLD AUTO: 0.6 % — SIGNIFICANT CHANGE UP (ref 0–2)
BILIRUB SERPL-MCNC: 1 MG/DL — SIGNIFICANT CHANGE UP (ref 0.2–1.2)
BLOOD GAS COMMENTS ARTERIAL: SIGNIFICANT CHANGE UP
BLOOD GAS COMMENTS ARTERIAL: SIGNIFICANT CHANGE UP
BUN SERPL-MCNC: 27 MG/DL — HIGH (ref 7–23)
CALCIUM SERPL-MCNC: 8.8 MG/DL — SIGNIFICANT CHANGE UP (ref 8.5–10.1)
CHLORIDE SERPL-SCNC: 97 MMOL/L — SIGNIFICANT CHANGE UP (ref 96–108)
CO2 BLDA-SCNC: 36 MMOL/L — HIGH (ref 19–24)
CO2 SERPL-SCNC: 30 MMOL/L — SIGNIFICANT CHANGE UP (ref 22–31)
CREAT SERPL-MCNC: 0.68 MG/DL — SIGNIFICANT CHANGE UP (ref 0.5–1.3)
EGFR: 106 ML/MIN/1.73M2 — SIGNIFICANT CHANGE UP
EOSINOPHIL # BLD AUTO: 0 K/UL — SIGNIFICANT CHANGE UP (ref 0–0.5)
EOSINOPHIL NFR BLD AUTO: 0 % — SIGNIFICANT CHANGE UP (ref 0–6)
GAS PNL BLDA: SIGNIFICANT CHANGE UP
GLUCOSE BLDC GLUCOMTR-MCNC: 139 MG/DL — HIGH (ref 70–99)
GLUCOSE BLDC GLUCOMTR-MCNC: 147 MG/DL — HIGH (ref 70–99)
GLUCOSE BLDC GLUCOMTR-MCNC: 193 MG/DL — HIGH (ref 70–99)
GLUCOSE BLDC GLUCOMTR-MCNC: 273 MG/DL — HIGH (ref 70–99)
GLUCOSE SERPL-MCNC: 123 MG/DL — HIGH (ref 70–99)
HCO3 BLDA-SCNC: 34 MMOL/L — HIGH (ref 21–28)
HCT VFR BLD CALC: 63 % — CRITICAL HIGH (ref 39–50)
HGB BLD-MCNC: 19.3 G/DL — CRITICAL HIGH (ref 13–17)
HOROWITZ INDEX BLDA+IHG-RTO: 40 — SIGNIFICANT CHANGE UP
HPIV4 RNA SPEC QL NAA+PROBE: DETECTED
IMM GRANULOCYTES NFR BLD AUTO: 0.5 % — SIGNIFICANT CHANGE UP (ref 0–1.5)
INR BLD: 1.13 RATIO — SIGNIFICANT CHANGE UP (ref 0.88–1.16)
LACTATE SERPL-SCNC: 2.4 MMOL/L — HIGH (ref 0.7–2)
LYMPHOCYTES # BLD AUTO: 0.76 K/UL — LOW (ref 1–3.3)
LYMPHOCYTES # BLD AUTO: 9.2 % — LOW (ref 13–44)
MCHC RBC-ENTMCNC: 30.6 G/DL — LOW (ref 32–36)
MCHC RBC-ENTMCNC: 31.4 PG — SIGNIFICANT CHANGE UP (ref 27–34)
MCV RBC AUTO: 102.6 FL — HIGH (ref 80–100)
MONOCYTES # BLD AUTO: 1.27 K/UL — HIGH (ref 0–0.9)
MONOCYTES NFR BLD AUTO: 15.4 % — HIGH (ref 2–14)
NEUTROPHILS # BLD AUTO: 6.14 K/UL — SIGNIFICANT CHANGE UP (ref 1.8–7.4)
NEUTROPHILS NFR BLD AUTO: 74.3 % — SIGNIFICANT CHANGE UP (ref 43–77)
NRBC # BLD: 0 /100 WBCS — SIGNIFICANT CHANGE UP (ref 0–0)
NT-PROBNP SERPL-SCNC: 2089 PG/ML — HIGH (ref 0–125)
PCO2 BLDA: 66 MMHG — HIGH (ref 32–46)
PH BLDA: 7.32 — LOW (ref 7.35–7.45)
PLATELET # BLD AUTO: 120 K/UL — LOW (ref 150–400)
PO2 BLDA: 99 MMHG — SIGNIFICANT CHANGE UP (ref 83–108)
POTASSIUM SERPL-MCNC: 5 MMOL/L — SIGNIFICANT CHANGE UP (ref 3.5–5.3)
POTASSIUM SERPL-SCNC: 5 MMOL/L — SIGNIFICANT CHANGE UP (ref 3.5–5.3)
PROCALCITONIN SERPL-MCNC: 0.09 NG/ML — SIGNIFICANT CHANGE UP (ref 0.02–0.1)
PROT SERPL-MCNC: 7.6 GM/DL — SIGNIFICANT CHANGE UP (ref 6–8.3)
PROTHROM AB SERPL-ACNC: 13.5 SEC — HIGH (ref 10.5–13.4)
RAPID RVP RESULT: DETECTED
RBC # BLD: 6.14 M/UL — HIGH (ref 4.2–5.8)
RBC # FLD: 13.9 % — SIGNIFICANT CHANGE UP (ref 10.3–14.5)
SAO2 % BLDA: 96.8 % — SIGNIFICANT CHANGE UP (ref 94–98)
SARS-COV-2 RNA SPEC QL NAA+PROBE: SIGNIFICANT CHANGE UP
SODIUM SERPL-SCNC: 135 MMOL/L — SIGNIFICANT CHANGE UP (ref 135–145)
TROPONIN I, HIGH SENSITIVITY RESULT: 59.9 NG/L — SIGNIFICANT CHANGE UP
WBC # BLD: 8.26 K/UL — SIGNIFICANT CHANGE UP (ref 3.8–10.5)
WBC # FLD AUTO: 8.26 K/UL — SIGNIFICANT CHANGE UP (ref 3.8–10.5)

## 2022-05-09 PROCEDURE — 71045 X-RAY EXAM CHEST 1 VIEW: CPT | Mod: 26

## 2022-05-09 PROCEDURE — 71275 CT ANGIOGRAPHY CHEST: CPT | Mod: 26,MA

## 2022-05-09 PROCEDURE — 99223 1ST HOSP IP/OBS HIGH 75: CPT

## 2022-05-09 PROCEDURE — 93010 ELECTROCARDIOGRAM REPORT: CPT

## 2022-05-09 RX ORDER — DEXTROSE 50 % IN WATER 50 %
15 SYRINGE (ML) INTRAVENOUS ONCE
Refills: 0 | Status: DISCONTINUED | OUTPATIENT
Start: 2022-05-09 | End: 2022-05-16

## 2022-05-09 RX ORDER — BUDESONIDE AND FORMOTEROL FUMARATE DIHYDRATE 160; 4.5 UG/1; UG/1
2 AEROSOL RESPIRATORY (INHALATION)
Refills: 0 | Status: DISCONTINUED | OUTPATIENT
Start: 2022-05-09 | End: 2022-05-16

## 2022-05-09 RX ORDER — ONDANSETRON 8 MG/1
4 TABLET, FILM COATED ORAL EVERY 8 HOURS
Refills: 0 | Status: DISCONTINUED | OUTPATIENT
Start: 2022-05-09 | End: 2022-05-16

## 2022-05-09 RX ORDER — AZITHROMYCIN 500 MG/1
500 TABLET, FILM COATED ORAL ONCE
Refills: 0 | Status: COMPLETED | OUTPATIENT
Start: 2022-05-09 | End: 2022-05-09

## 2022-05-09 RX ORDER — DEXTROSE 50 % IN WATER 50 %
25 SYRINGE (ML) INTRAVENOUS ONCE
Refills: 0 | Status: DISCONTINUED | OUTPATIENT
Start: 2022-05-09 | End: 2022-05-16

## 2022-05-09 RX ORDER — IPRATROPIUM/ALBUTEROL SULFATE 18-103MCG
3 AEROSOL WITH ADAPTER (GRAM) INHALATION ONCE
Refills: 0 | Status: COMPLETED | OUTPATIENT
Start: 2022-05-09 | End: 2022-05-09

## 2022-05-09 RX ORDER — SODIUM CHLORIDE 9 MG/ML
1000 INJECTION, SOLUTION INTRAVENOUS
Refills: 0 | Status: DISCONTINUED | OUTPATIENT
Start: 2022-05-09 | End: 2022-05-16

## 2022-05-09 RX ORDER — INSULIN LISPRO 100/ML
VIAL (ML) SUBCUTANEOUS
Refills: 0 | Status: DISCONTINUED | OUTPATIENT
Start: 2022-05-09 | End: 2022-05-13

## 2022-05-09 RX ORDER — ALBUTEROL 90 UG/1
2 AEROSOL, METERED ORAL EVERY 6 HOURS
Refills: 0 | Status: DISCONTINUED | OUTPATIENT
Start: 2022-05-09 | End: 2022-05-10

## 2022-05-09 RX ORDER — TIOTROPIUM BROMIDE 18 UG/1
1 CAPSULE ORAL; RESPIRATORY (INHALATION) DAILY
Refills: 0 | Status: DISCONTINUED | OUTPATIENT
Start: 2022-05-09 | End: 2022-05-10

## 2022-05-09 RX ORDER — AZITHROMYCIN 500 MG/1
TABLET, FILM COATED ORAL
Refills: 0 | Status: DISCONTINUED | OUTPATIENT
Start: 2022-05-09 | End: 2022-05-12

## 2022-05-09 RX ORDER — ASPIRIN/CALCIUM CARB/MAGNESIUM 324 MG
81 TABLET ORAL DAILY
Refills: 0 | Status: DISCONTINUED | OUTPATIENT
Start: 2022-05-09 | End: 2022-05-16

## 2022-05-09 RX ORDER — GLUCAGON INJECTION, SOLUTION 0.5 MG/.1ML
1 INJECTION, SOLUTION SUBCUTANEOUS ONCE
Refills: 0 | Status: DISCONTINUED | OUTPATIENT
Start: 2022-05-09 | End: 2022-05-16

## 2022-05-09 RX ORDER — ACETAMINOPHEN 500 MG
650 TABLET ORAL EVERY 6 HOURS
Refills: 0 | Status: DISCONTINUED | OUTPATIENT
Start: 2022-05-09 | End: 2022-05-16

## 2022-05-09 RX ORDER — AZITHROMYCIN 500 MG/1
500 TABLET, FILM COATED ORAL EVERY 24 HOURS
Refills: 0 | Status: DISCONTINUED | OUTPATIENT
Start: 2022-05-10 | End: 2022-05-12

## 2022-05-09 RX ORDER — LANOLIN ALCOHOL/MO/W.PET/CERES
3 CREAM (GRAM) TOPICAL AT BEDTIME
Refills: 0 | Status: DISCONTINUED | OUTPATIENT
Start: 2022-05-09 | End: 2022-05-16

## 2022-05-09 RX ORDER — SODIUM CHLORIDE 9 MG/ML
500 INJECTION INTRAMUSCULAR; INTRAVENOUS; SUBCUTANEOUS ONCE
Refills: 0 | Status: COMPLETED | OUTPATIENT
Start: 2022-05-09 | End: 2022-05-09

## 2022-05-09 RX ADMIN — ALBUTEROL 2 PUFF(S): 90 AEROSOL, METERED ORAL at 19:59

## 2022-05-09 RX ADMIN — TIOTROPIUM BROMIDE 1 CAPSULE(S): 18 CAPSULE ORAL; RESPIRATORY (INHALATION) at 22:25

## 2022-05-09 RX ADMIN — Medication 40 MILLIGRAM(S): at 15:33

## 2022-05-09 RX ADMIN — Medication 3 MILLILITER(S): at 00:24

## 2022-05-09 RX ADMIN — SODIUM CHLORIDE 500 MILLILITER(S): 9 INJECTION INTRAMUSCULAR; INTRAVENOUS; SUBCUTANEOUS at 05:45

## 2022-05-09 RX ADMIN — Medication 1: at 16:03

## 2022-05-09 RX ADMIN — BUDESONIDE AND FORMOTEROL FUMARATE DIHYDRATE 2 PUFF(S): 160; 4.5 AEROSOL RESPIRATORY (INHALATION) at 20:05

## 2022-05-09 RX ADMIN — Medication 125 MILLIGRAM(S): at 00:58

## 2022-05-09 RX ADMIN — Medication 81 MILLIGRAM(S): at 12:27

## 2022-05-09 RX ADMIN — AZITHROMYCIN 255 MILLIGRAM(S): 500 TABLET, FILM COATED ORAL at 07:53

## 2022-05-09 RX ADMIN — Medication 40 MILLIGRAM(S): at 22:25

## 2022-05-09 NOTE — ED PROVIDER NOTE - OBJECTIVE STATEMENT
61M PMHx of HTN, HLD, 61M PMHx of HTN, DM, OA, COPD (no home o2), chronic smoker, polycythemia presenting with hypoxia saturating 60% on RA and SOB. Describes over past few days, gradual onset of difficulty breathing, a/w exertional dyspnea. Denies any chest pain, coughs, chills, fevers, increased sputum production, falls, trauma, syncope, abdominal pain, dysuria, hematuria, headaches, neck stiffness, neck pain, recent travel, recent surgery, immobility, extremity swelling, hemoptysis, hormone use, known personal cancer history, or personal/family history of blood clots.

## 2022-05-09 NOTE — ED PROVIDER NOTE - PHYSICAL EXAMINATION
VITAL SIGNS: I have reviewed nursing notes and confirm.   GEN: Well-developed; well-nourished; in (+) moderate-severe acute respiratory distress. Few words per sentence.   SKIN: (+) cool, pink, no rash, no diaphoresis, (+) diffuse cyanosis, well perfused.   HEAD: Normocephalic; atraumatic. No scalp lacerations, no abrasions.  NECK: Supple; non tender.   EYES: Pupils 3mm equal, round, reactive to light and accomodation, conjunctiva and sclera clear. Extra-ocular movements intact bilaterally.  ENT: No nasal discharge; airway clear. Trachea is midline. ORAL: No oropharyngeal exudates or erythema. Normal dentition.  CV: (+) tachycardia, S1, S2 normal; no murmurs, gallops, or rubs. No lower extremity pitting edema bilaterally. Capillary refill < 2 seconds throughout. Distal pulses intact 2+ throughout.  RESP: (+) diffuse rhonchi, mild bibasilar rales.   ABD: Normal bowel sounds, soft, non-distended, non-tender, no rebound, no guarding, no rigidity, no hepatosplenomegaly. No CVA tenderness bilaterally.  MSK: Normal range of motion and movement of all 4 extremities. No joint or muscular pain throughout. No clubbing.   BACK: No thoracolumbar midline or paravertebral tenderness. No step-offs or obvious deformities.  NEURO: Alert & oriented x 3, Grossly unremarkable. Sensory and motor intact throughout. No focal deficits. Normal speech and coordination.   PSYCH: Cooperative, appropriate.

## 2022-05-09 NOTE — H&P ADULT - NSHPPHYSICALEXAM_GEN_ALL_CORE
Constitutional: NAD AAO x 3   HEENT PERRLA EOMI  CV RRR S1S2  Pulm diffusely restricted b/l   GI soft nontender nondistended + BS   Neuro CN II-XII grossly intact   Extremities no edema or calf tenderness

## 2022-05-09 NOTE — ED PROVIDER NOTE - PROGRESS NOTE DETAILS
ICU evaluated, recommend tele admit. Patient well appearing on BIPAP improved, saturating 100% on 40% fio2. RVP showing parainfluenza, viral infection, and BNP 2000 and trop 59. ABG ph 7.32, pco2 66, po2 99, mild minimal respiratory acidosis. H/H 19.3 and 63.

## 2022-05-09 NOTE — ED PROVIDER NOTE - CLINICAL SUMMARY MEDICAL DECISION MAKING FREE TEXT BOX
Pt presents with symptoms most consistent w/ an acute COPD exacerbation. The likely precipitant is acute respiratory infection. Low suspicion for alternate etiologies such as pneumothorax, acute pulmonary embolism, ACS/NSTEMI, CHF, or cardiac effusion.  - Pseudomonas risk factors: recent hospitalizations, frequent antibiotic treatment, severe COPD, previously isolated pseudomonas.   - Maintain oxygen saturations 90-94% with supplemental oxygen PRN. BIPAP PRN worsening work of breathing.  - Trial of duonebs x 3 with solumedrol 125mg IVP, Serial re-assessments. Continuous albuterol PRN.  - Antibiotics are indicated given purulent sputum, increased sputum production, trial of BIPAP.   - CXR to evaluate for other acute cardiopulmonary processes, CBC, CMP, EKG  - Re-evaluate and disposition accordingly.     Considered hyperviscosity syndrome, but no leukocytosis, no elevated platelets, no plethora. will need heme/onc workup.

## 2022-05-09 NOTE — ED PROVIDER NOTE - CRITICAL CARE ATTENDING CONTRIBUTION TO CARE
I have discussed the case with the resident/mid-level provider. I have personally performed a history, physical exam, and my own medical decision making. I have reviewed the note and agree with the findings and plan with the following exceptions: None.    Upon my evaluation, this patient had a high probability of imminent or lifethreatening deterioration due to copd exacerbation, hypoxia, polycythemia vera, which required my direct attention, intervention, and personal management.     I have personally provided 35 minutes of critical care time exclusive of time spent on separately billable procedures. Time includes review of laboratory data, radiology results, discussion with consultants, and monitoring for potential decompensation. Interventions were performed as documented above.    - Jayden Gamez MD, Emergency Medicine and Medical Toxicology Attending.

## 2022-05-09 NOTE — ED PROVIDER NOTE - CARE PLAN
1 Principal Discharge DX:	COPD with exacerbation  Secondary Diagnosis:	Acute respiratory failure with hypoxia  Secondary Diagnosis:	Polycythemia

## 2022-05-09 NOTE — ED PROVIDER NOTE - NSICDXPASTMEDICALHX_GEN_ALL_CORE_FT
PAST MEDICAL HISTORY:  Chronic obstructive pulmonary disease, unspecified COPD type     DM (diabetes mellitus)

## 2022-05-09 NOTE — H&P ADULT - HISTORY OF PRESENT ILLNESS
This is a 60 yo M with Hx of HTN, DM, OA, COPD (not on home o2), chronic smoker, polycythemia presenting with sob worsening for several days, found to be hypoxic 60% on RA. Parainfluenxa +. denies f/c, h/a, sputum/cough, sick contacts, cp palpitations, orthopnea, pnd, le edema. CTA chest (-) PE or consolidation. labs significant for Hgb 19.3 BNP 2089 abg ph 7.32 co2 66  placed on bipap 40% FIO2

## 2022-05-09 NOTE — ED ADULT NURSE NOTE - CHIEF COMPLAINT QUOTE
pt presents to the ED c/o difficulty breathing started on friday and getting worse, SP02 stating @70s on RA.

## 2022-05-09 NOTE — ED ADULT NURSE NOTE - NSIMPLEMENTINTERV_GEN_ALL_ED
Implemented All Universal Safety Interventions:  [place] to call system. Call bell, personal items and telephone within reach. Instruct patient to call for assistance. Room bathroom lighting operational. Non-slip footwear when patient is off stretcher. Physically safe environment: no spills, clutter or unnecessary equipment. Stretcher in lowest position, wheels locked, appropriate side rails in place.

## 2022-05-09 NOTE — H&P ADULT - ASSESSMENT
This is a 60 yo M with Hx of HTN, DM, OA, COPD (not on home o2), chronic smoker, polycythemia presenting with sob worsening for several days, found to be hypoxic 60% on RA. Parainfluenxa +. denies f/c, h/a, sputum/cough, sick contacts, cp palpitations, orthopnea, pnd, le edema. CTA chest (-) PE or consolidation. labs significant for Hgb 19.3 BNP 2089 abg ph 7.32 co2 66  placed on bipap 40% FIO2     Acute hypoxic respiratory failure due to viral PNA   parainfluenza   acute on chronic copd exacerbation   htn   NIDDM2  OA   smoker   Polycythemia   -duonebs, symbicortt, medrol, azithromycin  -O2 support as needed for sat > 92%   -CTA no PE. Hg elevated. consider Heme consult if no improvement with above treatment   -ISS BGM  -home asa  -smoking cessation  -PT    -SCDs, Dm diet

## 2022-05-10 DIAGNOSIS — I48.92 UNSPECIFIED ATRIAL FLUTTER: ICD-10-CM

## 2022-05-10 DIAGNOSIS — D75.1 SECONDARY POLYCYTHEMIA: ICD-10-CM

## 2022-05-10 DIAGNOSIS — B34.8 OTHER VIRAL INFECTIONS OF UNSPECIFIED SITE: ICD-10-CM

## 2022-05-10 LAB
A1C WITH ESTIMATED AVERAGE GLUCOSE RESULT: 6.9 % — HIGH (ref 4–5.6)
ALBUMIN SERPL ELPH-MCNC: 2.9 G/DL — LOW (ref 3.3–5)
ALP SERPL-CCNC: 51 U/L — SIGNIFICANT CHANGE UP (ref 40–120)
ALT FLD-CCNC: 32 U/L — SIGNIFICANT CHANGE UP (ref 12–78)
ANION GAP SERPL CALC-SCNC: 4 MMOL/L — LOW (ref 5–17)
AST SERPL-CCNC: 14 U/L — LOW (ref 15–37)
BASE EXCESS BLDA CALC-SCNC: 12.4 MMOL/L — HIGH (ref -2–3)
BASOPHILS # BLD AUTO: 0.02 K/UL — SIGNIFICANT CHANGE UP (ref 0–0.2)
BASOPHILS NFR BLD AUTO: 0.3 % — SIGNIFICANT CHANGE UP (ref 0–2)
BILIRUB SERPL-MCNC: 0.4 MG/DL — SIGNIFICANT CHANGE UP (ref 0.2–1.2)
BLOOD GAS COMMENTS ARTERIAL: SIGNIFICANT CHANGE UP
BLOOD GAS COMMENTS ARTERIAL: SIGNIFICANT CHANGE UP
BUN SERPL-MCNC: 32 MG/DL — HIGH (ref 7–23)
CALCIUM SERPL-MCNC: 8.9 MG/DL — SIGNIFICANT CHANGE UP (ref 8.5–10.1)
CHLORIDE SERPL-SCNC: 98 MMOL/L — SIGNIFICANT CHANGE UP (ref 96–108)
CO2 BLDA-SCNC: 46 MMOL/L — HIGH (ref 19–24)
CO2 SERPL-SCNC: 37 MMOL/L — HIGH (ref 22–31)
CREAT SERPL-MCNC: 0.58 MG/DL — SIGNIFICANT CHANGE UP (ref 0.5–1.3)
EGFR: 111 ML/MIN/1.73M2 — SIGNIFICANT CHANGE UP
EOSINOPHIL # BLD AUTO: 0 K/UL — SIGNIFICANT CHANGE UP (ref 0–0.5)
EOSINOPHIL NFR BLD AUTO: 0 % — SIGNIFICANT CHANGE UP (ref 0–6)
ESTIMATED AVERAGE GLUCOSE: 151 MG/DL — HIGH (ref 68–114)
GAS PNL BLDA: SIGNIFICANT CHANGE UP
GLUCOSE BLDC GLUCOMTR-MCNC: 184 MG/DL — HIGH (ref 70–99)
GLUCOSE BLDC GLUCOMTR-MCNC: 247 MG/DL — HIGH (ref 70–99)
GLUCOSE BLDC GLUCOMTR-MCNC: 251 MG/DL — HIGH (ref 70–99)
GLUCOSE SERPL-MCNC: 179 MG/DL — HIGH (ref 70–99)
HCO3 BLDA-SCNC: 43 MMOL/L — HIGH (ref 21–28)
HCT VFR BLD CALC: 60.8 % — CRITICAL HIGH (ref 39–50)
HGB BLD-MCNC: 18.4 G/DL — HIGH (ref 13–17)
HOROWITZ INDEX BLDA+IHG-RTO: 40 — SIGNIFICANT CHANGE UP
IMM GRANULOCYTES NFR BLD AUTO: 1.1 % — SIGNIFICANT CHANGE UP (ref 0–1.5)
LYMPHOCYTES # BLD AUTO: 0.66 K/UL — LOW (ref 1–3.3)
LYMPHOCYTES # BLD AUTO: 9.9 % — LOW (ref 13–44)
MCHC RBC-ENTMCNC: 30.3 G/DL — LOW (ref 32–36)
MCHC RBC-ENTMCNC: 31.6 PG — SIGNIFICANT CHANGE UP (ref 27–34)
MCV RBC AUTO: 104.3 FL — HIGH (ref 80–100)
MONOCYTES # BLD AUTO: 0.49 K/UL — SIGNIFICANT CHANGE UP (ref 0–0.9)
MONOCYTES NFR BLD AUTO: 7.4 % — SIGNIFICANT CHANGE UP (ref 2–14)
NEUTROPHILS # BLD AUTO: 5.4 K/UL — SIGNIFICANT CHANGE UP (ref 1.8–7.4)
NEUTROPHILS NFR BLD AUTO: 81.3 % — HIGH (ref 43–77)
NRBC # BLD: 0 /100 WBCS — SIGNIFICANT CHANGE UP (ref 0–0)
PCO2 BLDA: 87 MMHG — CRITICAL HIGH (ref 32–46)
PH BLDA: 7.3 — LOW (ref 7.35–7.45)
PLATELET # BLD AUTO: 119 K/UL — LOW (ref 150–400)
PO2 BLDA: 78 MMHG — LOW (ref 83–108)
POTASSIUM SERPL-MCNC: 4.9 MMOL/L — SIGNIFICANT CHANGE UP (ref 3.5–5.3)
POTASSIUM SERPL-SCNC: 4.9 MMOL/L — SIGNIFICANT CHANGE UP (ref 3.5–5.3)
PROT SERPL-MCNC: 6.4 G/DL — SIGNIFICANT CHANGE UP (ref 6–8.3)
PROT SERPL-MCNC: 6.4 G/DL — SIGNIFICANT CHANGE UP (ref 6–8.3)
PROT SERPL-MCNC: 7 GM/DL — SIGNIFICANT CHANGE UP (ref 6–8.3)
RBC # BLD: 5.83 M/UL — HIGH (ref 4.2–5.8)
RBC # FLD: 13.7 % — SIGNIFICANT CHANGE UP (ref 10.3–14.5)
SAO2 % BLDA: 95.3 % — SIGNIFICANT CHANGE UP (ref 94–98)
SODIUM SERPL-SCNC: 139 MMOL/L — SIGNIFICANT CHANGE UP (ref 135–145)
WBC # BLD: 6.64 K/UL — SIGNIFICANT CHANGE UP (ref 3.8–10.5)
WBC # FLD AUTO: 6.64 K/UL — SIGNIFICANT CHANGE UP (ref 3.8–10.5)

## 2022-05-10 PROCEDURE — 99223 1ST HOSP IP/OBS HIGH 75: CPT | Mod: 25

## 2022-05-10 PROCEDURE — 99233 SBSQ HOSP IP/OBS HIGH 50: CPT

## 2022-05-10 PROCEDURE — 99406 BEHAV CHNG SMOKING 3-10 MIN: CPT

## 2022-05-10 RX ORDER — SODIUM CHLORIDE 9 MG/ML
4 INJECTION INTRAMUSCULAR; INTRAVENOUS; SUBCUTANEOUS EVERY 6 HOURS
Refills: 0 | Status: DISCONTINUED | OUTPATIENT
Start: 2022-05-10 | End: 2022-05-16

## 2022-05-10 RX ORDER — NICOTINE POLACRILEX 2 MG
1 GUM BUCCAL DAILY
Refills: 0 | Status: DISCONTINUED | OUTPATIENT
Start: 2022-05-10 | End: 2022-05-16

## 2022-05-10 RX ORDER — IPRATROPIUM/ALBUTEROL SULFATE 18-103MCG
3 AEROSOL WITH ADAPTER (GRAM) INHALATION EVERY 6 HOURS
Refills: 0 | Status: DISCONTINUED | OUTPATIENT
Start: 2022-05-10 | End: 2022-05-16

## 2022-05-10 RX ADMIN — ALBUTEROL 2 PUFF(S): 90 AEROSOL, METERED ORAL at 06:07

## 2022-05-10 RX ADMIN — Medication 1: at 08:23

## 2022-05-10 RX ADMIN — Medication 3: at 17:57

## 2022-05-10 RX ADMIN — Medication 81 MILLIGRAM(S): at 12:18

## 2022-05-10 RX ADMIN — BUDESONIDE AND FORMOTEROL FUMARATE DIHYDRATE 2 PUFF(S): 160; 4.5 AEROSOL RESPIRATORY (INHALATION) at 17:56

## 2022-05-10 RX ADMIN — Medication 1 PATCH: at 22:54

## 2022-05-10 RX ADMIN — Medication 40 MILLIGRAM(S): at 06:05

## 2022-05-10 RX ADMIN — SODIUM CHLORIDE 4 MILLILITER(S): 9 INJECTION INTRAMUSCULAR; INTRAVENOUS; SUBCUTANEOUS at 12:02

## 2022-05-10 RX ADMIN — SODIUM CHLORIDE 4 MILLILITER(S): 9 INJECTION INTRAMUSCULAR; INTRAVENOUS; SUBCUTANEOUS at 17:43

## 2022-05-10 RX ADMIN — Medication 20 MILLIGRAM(S): at 22:54

## 2022-05-10 RX ADMIN — Medication 2: at 12:18

## 2022-05-10 RX ADMIN — BUDESONIDE AND FORMOTEROL FUMARATE DIHYDRATE 2 PUFF(S): 160; 4.5 AEROSOL RESPIRATORY (INHALATION) at 06:07

## 2022-05-10 RX ADMIN — Medication 20 MILLIGRAM(S): at 14:53

## 2022-05-10 RX ADMIN — AZITHROMYCIN 255 MILLIGRAM(S): 500 TABLET, FILM COATED ORAL at 06:06

## 2022-05-10 RX ADMIN — Medication 3 MILLILITER(S): at 17:43

## 2022-05-10 RX ADMIN — ALBUTEROL 2 PUFF(S): 90 AEROSOL, METERED ORAL at 01:51

## 2022-05-10 RX ADMIN — Medication 3 MILLILITER(S): at 11:50

## 2022-05-10 NOTE — CONSULT NOTE ADULT - TIME BILLING
Review of medical records/ imaging from previous visits, review of current labs, imaging, POCUS, devising pulmonary management plan, coordination of care with team and consultants, counseling of patient on condition and management plan.

## 2022-05-10 NOTE — PHYSICAL THERAPY INITIAL EVALUATION ADULT - LEVEL OF INDEPENDENCE: GAIT, REHAB EVAL
BMI for Adults  Body mass index (BMI) is a number that is calculated from a person's weight and height. In most adults, the number is used to find how much of an adult's weight is made up of fat. BMI is not as accurate as a direct measure of body fat.  How is BMI calculated?  BMI is calculated by dividing weight in kilograms by height in meters squared. It can also be calculated by dividing weight in pounds by height in inches squared, then multiplying the resulting number by 703. Charts are available to help you find your BMI quickly and easily without doing this calculation.  How is BMI interpreted?  Health care professionals use BMI charts to identify whether an adult is underweight, at a normal weight, or overweight based on the following guidelines:  · Underweight: BMI less than 18.5.  · Normal weight: BMI between 18.5 and 24.9.  · Overweight: BMI between 25 and 29.9.  · Obese: BMI of 30 and above.  BMI is usually interpreted the same for males and females.  Weight includes both fat and muscle, so someone with a muscular build, such as an athlete, may have a BMI that is higher than 24.9. In cases like these, BMI may not accurately depict body fat. To determine if excess body fat is the cause of a BMI of 25 or higher, further assessments may need to be done by a health care provider.  Why is BMI a useful tool?  BMI is used to identify a possible weight problem that may be related to a medical problem or may increase the risk for medical problems. BMI can also be used to promote changes to reach a healthy weight.  This information is not intended to replace advice given to you by your health care provider. Make sure you discuss any questions you have with your health care provider.  Document Released: 08/29/2005 Document Revised: 04/27/2017 Document Reviewed: 05/15/2015  [employer] Interactive Patient Education © 2017 [employer]    HEALTH MAINTENANCE DISCUSSED WITH THE PATIENT AND SHE STATES THAT SHE HAD THE FLU  & PNEUMO INJECTIONS IN October 2017. SHE HAD HER PAP IN MARCH 2018.  SHE STATES THAT SHE IS UP-TO-DATE WITH EVERYTHING     independent

## 2022-05-10 NOTE — PROGRESS NOTE ADULT - PROBLEM SELECTOR PLAN 7
secondary to smoking history possible copd   will continue to monitor   dvt prophaylaxis?  Hematology consult

## 2022-05-10 NOTE — PROGRESS NOTE ADULT - PROBLEM SELECTOR PLAN 6
questionable history not currently on calcium channel blocker or betablocker   check echo   check tsh   cardiology consult    Question need for anticoagulation   CHADS-Vasc score of 2 if afib adjusted risk of stroke 2.2% a year    Tele with afib aflutter sinus tach regular on exam secondary to smoking history possible copd   will continue to monitor   dvt prophaylaxis?  Hematology consult

## 2022-05-10 NOTE — PROGRESS NOTE ADULT - ASSESSMENT
This is a 62 yo M with Hx of HTN, DM, OA, COPD (not on home o2), chronic smoker, polycythemia presenting with sob worsening for several days, found to be hypoxic 60% on RA. Parainfluenxa +. denies f/c, h/a, sputum/cough, sick contacts, cp palpitations, orthopnea, pnd, le edema. CTA chest (-) PE or consolidation. labs significant for Hgb 19.3 BNP 2089 abg ph 7.32 co2 66  placed on bipap 40% FIO2

## 2022-05-10 NOTE — PROGRESS NOTE ADULT - SUBJECTIVE AND OBJECTIVE BOX
HPI:  This is a 62 yo M with Hx of HTN, DM, OA, COPD (not on home o2), chronic smoker, polycythemia presenting with sob worsening for several days, found to be hypoxic 60% on RA. Parainfluenxa +. denies f/c, h/a, sputum/cough, sick contacts, cp palpitations, orthopnea, pnd, le edema. CTA chest (-) PE or consolidation. labs significant for Hgb 19.3 BNP 2089 abg ph 7.32 co2 66  placed on bipap 40% FIO2    (09 May 2022 06:56)    Patient is a 61y old  Male who presents with a chief complaint of     INTERVAL HPI/OVERNIGHT EVENTS: NEW ADMISSION ENCOUNTERED ON NC BUT USED BIPAP MOST OF DAY     MEDICATIONS  (STANDING):  albuterol/ipratropium for Nebulization 3 milliLiter(s) Nebulizer every 6 hours  aspirin  chewable 81 milliGRAM(s) Oral daily  azithromycin  IVPB      azithromycin  IVPB 500 milliGRAM(s) IV Intermittent every 24 hours  budesonide 160 MICROgram(s)/formoterol 4.5 MICROgram(s) Inhaler 2 Puff(s) Inhalation two times a day  dextrose 5%. 1000 milliLiter(s) (50 mL/Hr) IV Continuous <Continuous>  dextrose 50% Injectable 25 Gram(s) IV Push once  glucagon  Injectable 1 milliGRAM(s) IntraMuscular once  insulin lispro (ADMELOG) corrective regimen sliding scale   SubCutaneous three times a day before meals  methylPREDNISolone sodium succinate Injectable 20 milliGRAM(s) IV Push every 8 hours  sodium chloride 3%  Inhalation 4 milliLiter(s) Inhalation every 6 hours    MEDICATIONS  (PRN):  acetaminophen     Tablet .. 650 milliGRAM(s) Oral every 6 hours PRN Temp greater or equal to 38C (100.4F), Mild Pain (1 - 3)  aluminum hydroxide/magnesium hydroxide/simethicone Suspension 30 milliLiter(s) Oral every 4 hours PRN Dyspepsia  dextrose Oral Gel 15 Gram(s) Oral once PRN Blood Glucose LESS THAN 70 milliGRAM(s)/deciliter  guaiFENesin Oral Liquid (Sugar-Free) 200 milliGRAM(s) Oral every 6 hours PRN Cough  melatonin 3 milliGRAM(s) Oral at bedtime PRN Insomnia  ondansetron Injectable 4 milliGRAM(s) IV Push every 8 hours PRN Nausea and/or Vomiting      Allergies    No Known Allergies    Intolerances        REVIEW OF SYSTEMS:  CONSTITUTIONAL: No fever, weight loss, or fatigue  EYES: No eye pain, visual disturbances, or discharge  ENMT:  No difficulty hearing, tinnitus, vertigo; No sinus or throat pain  NECK: No pain or stiffness  BREASTS: No pain, masses, or nipple discharge  RESPIRATORY: No cough, wheezing, chills or hemoptysis; No shortness of breath  CARDIOVASCULAR: No chest pain, palpitations, dizziness, or leg swelling  GASTROINTESTINAL: No abdominal or epigastric pain. No nausea, vomiting, or hematemesis; No diarrhea or constipation. No melena or hematochezia.  GENITOURINARY: No dysuria, frequency, hematuria, or incontinence  NEUROLOGICAL: No headaches, memory loss, loss of strength, numbness, or tremors  SKIN: No itching, burning, rashes, or lesions   LYMPH NODES: No enlarged glands  ENDOCRINE: No heat or cold intolerance; No hair loss  MUSCULOSKELETAL: No joint pain or swelling; No muscle, back, or extremity pain  PSYCHIATRIC: No depression, anxiety, mood swings, or difficulty sleeping  HEME/LYMPH: No easy bruising, or bleeding gums  ALLERGY AND IMMUNOLOGIC: No hives or eczema    Vital Signs Last 24 Hrs  T(C): 36.7 (10 May 2022 16:52), Max: 36.7 (09 May 2022 23:45)  T(F): 98 (10 May 2022 16:52), Max: 98.1 (09 May 2022 23:45)  HR: 89 (10 May 2022 16:52) (80 - 99)  BP: 128/72 (10 May 2022 16:52) (126/79 - 144/75)  BP(mean): --  RR: 18 (10 May 2022 16:52) (18 - 20)  SpO2: 93% (10 May 2022 16:52) (90% - 95%)    PHYSICAL EXAM:  GENERAL: NAD, well-groomed, well-developed  HEAD:  Atraumatic, Normocephalic  EYES: EOMI, PERRLA, conjunctiva and sclera clear  ENMT: No tonsillar erythema, exudates, or enlargement; Moist mucous membranes, Good dentition, No lesions  NECK: Supple, No JVD, Normal thyroid  NERVOUS SYSTEM:  Alert & Oriented X3, Good concentration; Motor Strength 5/5 B/L upper and lower extremities; DTRs 2+ intact and symmetric  CHEST/LUNG: scateered crackles bronchial breath sounds bipap now on   HEART: Regular rate and rhythm; No murmurs, rubs, or gallops  ABDOMEN: Soft, Nontender, Nondistended; Bowel sounds present  EXTREMITIES:  2+ Peripheral Pulses, No clubbing, cyanosis, or edema  LYMPH: No lymphadenopathy noted  SKIN: No rashes or lesions    LABS:                        18.4   6.64  )-----------( 119      ( 10 May 2022 06:56 )             60.8     05-10    139  |  98  |  32<H>  ----------------------------<  179<H>  4.9   |  37<H>  |  0.58    Ca    8.9      10 May 2022 06:56    TPro  7.0  /  Alb  2.9<L>  /  TBili  0.4  /  DBili  x   /  AST  14<L>  /  ALT  32  /  AlkPhos  51  05-10    PT/INR - ( 09 May 2022 01:00 )   PT: 13.5 sec;   INR: 1.13 ratio         PTT - ( 09 May 2022 01:00 )  PTT:37.0 sec    CAPILLARY BLOOD GLUCOSE      POCT Blood Glucose.: 251 mg/dL (10 May 2022 17:48)  POCT Blood Glucose.: 247 mg/dL (10 May 2022 11:32)  POCT Blood Glucose.: 184 mg/dL (10 May 2022 08:16)  POCT Blood Glucose.: 273 mg/dL (09 May 2022 22:27)  ABG - ( 10 May 2022 15:31 )  pH, Arterial: 7.30  pH, Blood: x     /  pCO2: 87    /  pO2: 78    / HCO3: 43    / Base Excess: 12.4  /  SaO2: 95.3                RADIOLOGY & ADDITIONAL TESTS:    Imaging Personally Reviewed:  [X ] YES  [ ] NO    Consultant(s) Notes Reviewed:  [X ] YES  [ ] NO    Care Discussed with Consultants/Other Providers [X ] YES  [ ] NO

## 2022-05-11 LAB
ALBUMIN SERPL ELPH-MCNC: 2.7 G/DL — LOW (ref 3.3–5)
ALP SERPL-CCNC: 46 U/L — SIGNIFICANT CHANGE UP (ref 40–120)
ALT FLD-CCNC: 35 U/L — SIGNIFICANT CHANGE UP (ref 12–78)
ANION GAP SERPL CALC-SCNC: 5 MMOL/L — SIGNIFICANT CHANGE UP (ref 5–17)
AST SERPL-CCNC: 16 U/L — SIGNIFICANT CHANGE UP (ref 15–37)
BASE EXCESS BLDA CALC-SCNC: 17.3 MMOL/L — HIGH (ref -2–3)
BILIRUB SERPL-MCNC: 0.4 MG/DL — SIGNIFICANT CHANGE UP (ref 0.2–1.2)
BLOOD GAS COMMENTS ARTERIAL: SIGNIFICANT CHANGE UP
BUN SERPL-MCNC: 21 MG/DL — SIGNIFICANT CHANGE UP (ref 7–23)
CALCIUM SERPL-MCNC: 8.7 MG/DL — SIGNIFICANT CHANGE UP (ref 8.5–10.1)
CHLORIDE SERPL-SCNC: 93 MMOL/L — LOW (ref 96–108)
CO2 BLDA-SCNC: 50 MMOL/L — HIGH (ref 19–24)
CO2 SERPL-SCNC: 41 MMOL/L — HIGH (ref 22–31)
CREAT SERPL-MCNC: 0.45 MG/DL — LOW (ref 0.5–1.3)
EGFR: 120 ML/MIN/1.73M2 — SIGNIFICANT CHANGE UP
GAS PNL BLDA: SIGNIFICANT CHANGE UP
GLUCOSE BLDC GLUCOMTR-MCNC: 217 MG/DL — HIGH (ref 70–99)
GLUCOSE BLDC GLUCOMTR-MCNC: 221 MG/DL — HIGH (ref 70–99)
GLUCOSE BLDC GLUCOMTR-MCNC: 224 MG/DL — HIGH (ref 70–99)
GLUCOSE BLDC GLUCOMTR-MCNC: 252 MG/DL — HIGH (ref 70–99)
GLUCOSE SERPL-MCNC: 188 MG/DL — HIGH (ref 70–99)
HCO3 BLDA-SCNC: 48 MMOL/L — CRITICAL HIGH (ref 21–28)
HCT VFR BLD CALC: 60.9 % — CRITICAL HIGH (ref 39–50)
HGB BLD-MCNC: 18.1 G/DL — HIGH (ref 13–17)
HOROWITZ INDEX BLDA+IHG-RTO: 40 — SIGNIFICANT CHANGE UP
MAGNESIUM SERPL-MCNC: 2.1 MG/DL — SIGNIFICANT CHANGE UP (ref 1.6–2.6)
MCHC RBC-ENTMCNC: 29.7 G/DL — LOW (ref 32–36)
MCHC RBC-ENTMCNC: 31.4 PG — SIGNIFICANT CHANGE UP (ref 27–34)
MCV RBC AUTO: 105.5 FL — HIGH (ref 80–100)
NRBC # BLD: 0 /100 WBCS — SIGNIFICANT CHANGE UP (ref 0–0)
PCO2 BLDA: 86 MMHG — CRITICAL HIGH (ref 32–46)
PH BLDA: 7.35 — SIGNIFICANT CHANGE UP (ref 7.35–7.45)
PHOSPHATE SERPL-MCNC: 2.6 MG/DL — SIGNIFICANT CHANGE UP (ref 2.5–4.5)
PLATELET # BLD AUTO: 122 K/UL — LOW (ref 150–400)
PO2 BLDA: 119 MMHG — HIGH (ref 83–108)
POTASSIUM SERPL-MCNC: 4.7 MMOL/L — SIGNIFICANT CHANGE UP (ref 3.5–5.3)
POTASSIUM SERPL-SCNC: 4.7 MMOL/L — SIGNIFICANT CHANGE UP (ref 3.5–5.3)
PROT SERPL-MCNC: 6.6 GM/DL — SIGNIFICANT CHANGE UP (ref 6–8.3)
RBC # BLD: 5.77 M/UL — SIGNIFICANT CHANGE UP (ref 4.2–5.8)
RBC # FLD: 13.4 % — SIGNIFICANT CHANGE UP (ref 10.3–14.5)
SAO2 % BLDA: 97.1 % — SIGNIFICANT CHANGE UP (ref 94–98)
SODIUM SERPL-SCNC: 139 MMOL/L — SIGNIFICANT CHANGE UP (ref 135–145)
TSH SERPL-MCNC: 0.89 UIU/ML — SIGNIFICANT CHANGE UP (ref 0.36–3.74)
WBC # BLD: 5.88 K/UL — SIGNIFICANT CHANGE UP (ref 3.8–10.5)
WBC # FLD AUTO: 5.88 K/UL — SIGNIFICANT CHANGE UP (ref 3.8–10.5)

## 2022-05-11 PROCEDURE — 99233 SBSQ HOSP IP/OBS HIGH 50: CPT

## 2022-05-11 RX ORDER — ENOXAPARIN SODIUM 100 MG/ML
40 INJECTION SUBCUTANEOUS EVERY 24 HOURS
Refills: 0 | Status: DISCONTINUED | OUTPATIENT
Start: 2022-05-11 | End: 2022-05-16

## 2022-05-11 RX ADMIN — AZITHROMYCIN 255 MILLIGRAM(S): 500 TABLET, FILM COATED ORAL at 05:50

## 2022-05-11 RX ADMIN — Medication 200 MILLIGRAM(S): at 12:15

## 2022-05-11 RX ADMIN — SODIUM CHLORIDE 4 MILLILITER(S): 9 INJECTION INTRAMUSCULAR; INTRAVENOUS; SUBCUTANEOUS at 00:25

## 2022-05-11 RX ADMIN — Medication 3 MILLILITER(S): at 05:16

## 2022-05-11 RX ADMIN — Medication 20 MILLIGRAM(S): at 22:56

## 2022-05-11 RX ADMIN — ENOXAPARIN SODIUM 40 MILLIGRAM(S): 100 INJECTION SUBCUTANEOUS at 12:15

## 2022-05-11 RX ADMIN — BUDESONIDE AND FORMOTEROL FUMARATE DIHYDRATE 2 PUFF(S): 160; 4.5 AEROSOL RESPIRATORY (INHALATION) at 05:17

## 2022-05-11 RX ADMIN — Medication 20 MILLIGRAM(S): at 05:49

## 2022-05-11 RX ADMIN — Medication 3 MILLILITER(S): at 11:00

## 2022-05-11 RX ADMIN — Medication 1 PATCH: at 12:18

## 2022-05-11 RX ADMIN — Medication 2: at 17:45

## 2022-05-11 RX ADMIN — Medication 20 MILLIGRAM(S): at 14:41

## 2022-05-11 RX ADMIN — Medication 1 PATCH: at 19:30

## 2022-05-11 RX ADMIN — Medication 81 MILLIGRAM(S): at 12:16

## 2022-05-11 RX ADMIN — Medication 2: at 08:18

## 2022-05-11 RX ADMIN — Medication 2: at 12:17

## 2022-05-11 RX ADMIN — BUDESONIDE AND FORMOTEROL FUMARATE DIHYDRATE 2 PUFF(S): 160; 4.5 AEROSOL RESPIRATORY (INHALATION) at 17:44

## 2022-05-11 RX ADMIN — Medication 1 PATCH: at 07:35

## 2022-05-11 RX ADMIN — SODIUM CHLORIDE 4 MILLILITER(S): 9 INJECTION INTRAMUSCULAR; INTRAVENOUS; SUBCUTANEOUS at 05:19

## 2022-05-11 RX ADMIN — Medication 3 MILLILITER(S): at 17:03

## 2022-05-11 RX ADMIN — Medication 1 PATCH: at 12:15

## 2022-05-11 RX ADMIN — Medication 3 MILLILITER(S): at 00:15

## 2022-05-11 NOTE — PROGRESS NOTE ADULT - PROBLEM SELECTOR PLAN 6
secondary to smoking history possible copd   will continue to monitor   dvt prophaylaxis on board   Hematology consult appeciated

## 2022-05-11 NOTE — PROGRESS NOTE ADULT - SUBJECTIVE AND OBJECTIVE BOX
CHIEF COMPLAINT:    Interval Events:    REVIEW OF SYSTEMS:  Constitutional: [ ] negative [ ] fevers [ ] chills [ ] weight loss [ ] weight gain  HEENT: [ ] negative [ ] dry eyes [ ] eye irritation [ ] postnasal drip [ ] nasal congestion  CV: [ ] negative  [ ] chest pain [ ] orthopnea [ ] palpitations [ ] murmur  Resp: [ ] negative [ ] cough [ ] shortness of breath [ ] dyspnea [ ] wheezing [ ] sputum [ ] hemoptysis  GI: [ ] negative [ ] nausea [ ] vomiting [ ] diarrhea [ ] constipation [ ] abd pain [ ] dysphagia   : [ ] negative [ ] dysuria [ ] nocturia [ ] hematuria [ ] increased urinary frequency  Musculoskeletal: [ ] negative [ ] back pain [ ] myalgias [ ] arthralgias [ ] fracture  Skin: [ ] negative [ ] rash [ ] itch  Neurological: [ ] negative [ ] headache [ ] dizziness [ ] syncope [ ] weakness [ ] numbness  Psychiatric: [ ] negative [ ] anxiety [ ] depression  Endocrine: [ ] negative [ ] diabetes [ ] thyroid problem  Hematologic/Lymphatic: [ ] negative [ ] anemia [ ] bleeding problem  Allergic/Immunologic: [ ] negative [ ] itchy eyes [ ] nasal discharge [ ] hives [ ] angioedema  [ ] All other systems negative  [ ] Unable to assess ROS because ________    OBJECTIVE:  ICU Vital Signs Last 24 Hrs  T(C): 36.4 (11 May 2022 05:17), Max: 37 (11 May 2022 00:19)  T(F): 97.6 (11 May 2022 05:17), Max: 98.6 (11 May 2022 00:19)  HR: 84 (11 May 2022 08:26) (76 - 109)  BP: 131/77 (11 May 2022 05:17) (128/72 - 144/75)  BP(mean): --  ABP: --  ABP(mean): --  RR: 20 (11 May 2022 05:17) (18 - 20)  SpO2: 93% (11 May 2022 08:26) (92% - 97%)        05-10 @ 07:01  -  05-11 @ 07:00  --------------------------------------------------------  IN: 900 mL / OUT: 925 mL / NET: -25 mL      CAPILLARY BLOOD GLUCOSE      POCT Blood Glucose.: 217 mg/dL (11 May 2022 07:48)      PHYSICAL EXAM:  General:   HEENT:   Lymph Nodes:  Neck:   Respiratory:   Cardiovascular:   Abdomen:   Extremities:   Skin:   Neurological:  Psychiatry:    HOSPITAL MEDICATIONS:  aspirin  chewable 81 milliGRAM(s) Oral daily    azithromycin  IVPB      azithromycin  IVPB 500 milliGRAM(s) IV Intermittent every 24 hours      dextrose 50% Injectable 25 Gram(s) IV Push once  dextrose Oral Gel 15 Gram(s) Oral once PRN  glucagon  Injectable 1 milliGRAM(s) IntraMuscular once  insulin lispro (ADMELOG) corrective regimen sliding scale   SubCutaneous three times a day before meals  methylPREDNISolone sodium succinate Injectable 20 milliGRAM(s) IV Push every 8 hours    albuterol/ipratropium for Nebulization 3 milliLiter(s) Nebulizer every 6 hours  budesonide 160 MICROgram(s)/formoterol 4.5 MICROgram(s) Inhaler 2 Puff(s) Inhalation two times a day  guaiFENesin Oral Liquid (Sugar-Free) 200 milliGRAM(s) Oral every 6 hours PRN  sodium chloride 3%  Inhalation 4 milliLiter(s) Inhalation every 6 hours    acetaminophen     Tablet .. 650 milliGRAM(s) Oral every 6 hours PRN  melatonin 3 milliGRAM(s) Oral at bedtime PRN  ondansetron Injectable 4 milliGRAM(s) IV Push every 8 hours PRN    aluminum hydroxide/magnesium hydroxide/simethicone Suspension 30 milliLiter(s) Oral every 4 hours PRN        dextrose 5%. 1000 milliLiter(s) IV Continuous <Continuous>        nicotine - 21 mG/24Hr(s) Patch 1 patch Transdermal daily      LABS:                        18.1   5.88  )-----------( 122      ( 11 May 2022 07:10 )             60.9     Hgb Trend: 18.1<--, 18.4<--, 19.3<--  05-11    139  |  93<L>  |  21  ----------------------------<  188<H>  4.7   |  41<H>  |  0.45<L>    Ca    8.7      11 May 2022 07:10  Phos  2.6     05-11  Mg     2.1     05-11    TPro  6.6  /  Alb  2.7<L>  /  TBili  0.4  /  DBili  x   /  AST  16  /  ALT  35  /  AlkPhos  46  05-11    Creatinine Trend: 0.45<--, 0.58<--, 0.68<--      Arterial Blood Gas:  05-11 @ 05:41  7.35/86/119/48/97.1/17.3  ABG lactate: --  Arterial Blood Gas:  05-10 @ 15:31  7.30/87/78/43/95.3/12.4  ABG lactate: --        MICROBIOLOGY:     RADIOLOGY:  [ ] Reviewed and interpreted by me    PULMONARY FUNCTION TESTS:    EKG: CHIEF COMPLAINT: SOB    Interval Events: No acute events overnight Reports SOB is improving. Still with significant bouts of coughing and difficulty expectorating sputum. No fever, chills, CP, HA, dizziness, digit pain, itching, N/V/D, abd pain or dysuria     REVIEW OF SYSTEMS:  Constitutional: [ ] negative [-] fevers [- ] chills [+ ] weight loss [ ] weight gain  HEENT: [ ] negative [- ] dry eyes [- ] eye irritation [+ ] postnasal drip [- ] nasal congestion  CV: [ ] negative  [- ] chest pain [- ] orthopnea [- ] palpitations [ ] murmur  Resp: [ ] negative [+ ] cough [+ ] shortness of breath [ ] dyspnea [-] wheezing [+ ] sputum [ ] hemoptysis  GI: [ ] negative [- ] nausea [- ] vomiting [ -] diarrhea [ ] constipation [- ] abd pain [ ] dysphagia   : [ ] negative [- ] dysuria [- ] nocturia [- ] hematuria [ ] increased urinary frequency  Musculoskeletal: [ ] negative [- ] back pain [- ] myalgias [ ] arthralgias [ ] fracture  Skin: [ ] negative [- ] rash [ ] itch  Neurological: [ ] negative [- ] headache [- ] dizziness [- ] syncope [ ] weakness [ ] numbness  Psychiatric: [ ] negative [- ] anxiety [ ] depression  Endocrine: [ ] negative [- ] diabetes [ ] thyroid problem  Hematologic/Lymphatic: [ ] negative [ -] anemia [- ] bleeding problem  Allergic/Immunologic: [ ] negative [ ] itchy eyes [ -] nasal discharge [ ] hives [ ] angioedema  [x ] All other systems negative  [ ] Unable to assess ROS because ________    OBJECTIVE:  ICU Vital Signs Last 24 Hrs  T(C): 36.4 (11 May 2022 05:17), Max: 37 (11 May 2022 00:19)  T(F): 97.6 (11 May 2022 05:17), Max: 98.6 (11 May 2022 00:19)  HR: 84 (11 May 2022 08:26) (76 - 109)  BP: 131/77 (11 May 2022 05:17) (128/72 - 144/75)  BP(mean): --  ABP: --  ABP(mean): --  RR: 20 (11 May 2022 05:17) (18 - 20)  SpO2: 93% (11 May 2022 08:26) (92% - 97%)        05-10 @ 07:01  -  05-11 @ 07:00  --------------------------------------------------------  IN: 900 mL / OUT: 925 mL / NET: -25 mL      CAPILLARY BLOOD GLUCOSE      POCT Blood Glucose.: 217 mg/dL (11 May 2022 07:48)      PHYSICAL EXAM:  General: NAD, speaking in full sentences, no accessory muscle use   HEENT: PERRLA  Lymph Nodes: No palpable cervical LNs  Neck: Supple, no JVD  Respiratory: Scattered b/l wheezing through out both lung fields, no crackles   Cardiovascular: RRR, S1+S2+0  Abdomen: Soft, NT, ND, BS+   Extremities: No edema, pulses + b/l LEs  Skin: No rash, chronic venous changes in b/l LEs  Neurological: AAOx3, grossly non focal   Psychiatry: Normal mood     HOSPITAL MEDICATIONS:  aspirin  chewable 81 milliGRAM(s) Oral daily    azithromycin  IVPB      azithromycin  IVPB 500 milliGRAM(s) IV Intermittent every 24 hours      dextrose 50% Injectable 25 Gram(s) IV Push once  dextrose Oral Gel 15 Gram(s) Oral once PRN  glucagon  Injectable 1 milliGRAM(s) IntraMuscular once  insulin lispro (ADMELOG) corrective regimen sliding scale   SubCutaneous three times a day before meals  methylPREDNISolone sodium succinate Injectable 20 milliGRAM(s) IV Push every 8 hours    albuterol/ipratropium for Nebulization 3 milliLiter(s) Nebulizer every 6 hours  budesonide 160 MICROgram(s)/formoterol 4.5 MICROgram(s) Inhaler 2 Puff(s) Inhalation two times a day  guaiFENesin Oral Liquid (Sugar-Free) 200 milliGRAM(s) Oral every 6 hours PRN  sodium chloride 3%  Inhalation 4 milliLiter(s) Inhalation every 6 hours    acetaminophen     Tablet .. 650 milliGRAM(s) Oral every 6 hours PRN  melatonin 3 milliGRAM(s) Oral at bedtime PRN  ondansetron Injectable 4 milliGRAM(s) IV Push every 8 hours PRN    aluminum hydroxide/magnesium hydroxide/simethicone Suspension 30 milliLiter(s) Oral every 4 hours PRN        dextrose 5%. 1000 milliLiter(s) IV Continuous <Continuous>        nicotine - 21 mG/24Hr(s) Patch 1 patch Transdermal daily      LABS:                        18.1   5.88  )-----------( 122      ( 11 May 2022 07:10 )             60.9     Hgb Trend: 18.1<--, 18.4<--, 19.3<--  05-11    139  |  93<L>  |  21  ----------------------------<  188<H>  4.7   |  41<H>  |  0.45<L>    Ca    8.7      11 May 2022 07:10  Phos  2.6     05-11  Mg     2.1     05-11    TPro  6.6  /  Alb  2.7<L>  /  TBili  0.4  /  DBili  x   /  AST  16  /  ALT  35  /  AlkPhos  46  05-11    Creatinine Trend: 0.45<--, 0.58<--, 0.68<--      Arterial Blood Gas:  05-11 @ 05:41  7.35/86/119/48/97.1/17.3  ABG lactate: --  Arterial Blood Gas:  05-10 @ 15:31  7.30/87/78/43/95.3/12.4  ABG lactate: --        MICROBIOLOGY:     RADIOLOGY:  [ ] Reviewed and interpreted by me    PULMONARY FUNCTION TESTS:    EKG: CHIEF COMPLAINT: SOB    Interval Events: No acute events overnight Reports SOB is improving. Still with significant bouts of coughing and difficulty expectorating sputum. No fever, chills, CP, HA, dizziness, digit pain, itching, N/V/D, abd pain or dysuria     REVIEW OF SYSTEMS:  Constitutional: [ ] negative [-] fevers [- ] chills [+ ] weight loss [ ] weight gain  HEENT: [ ] negative [- ] dry eyes [- ] eye irritation [+ ] postnasal drip [- ] nasal congestion  CV: [ ] negative  [- ] chest pain [- ] orthopnea [- ] palpitations [ ] murmur  Resp: [ ] negative [+ ] cough [+ ] shortness of breath [ ] dyspnea [-] wheezing [+ ] sputum [ ] hemoptysis  GI: [ ] negative [- ] nausea [- ] vomiting [ -] diarrhea [ ] constipation [- ] abd pain [ ] dysphagia   : [ ] negative [- ] dysuria [- ] nocturia [- ] hematuria [ ] increased urinary frequency  Musculoskeletal: [ ] negative [- ] back pain [- ] myalgias [ ] arthralgias [ ] fracture  Skin: [ ] negative [- ] rash [ ] itch  Neurological: [ ] negative [- ] headache [- ] dizziness [- ] syncope [ ] weakness [ ] numbness  Psychiatric: [ ] negative [- ] anxiety [ ] depression  Endocrine: [ ] negative [- ] diabetes [ ] thyroid problem  Hematologic/Lymphatic: [ ] negative [ -] anemia [- ] bleeding problem  Allergic/Immunologic: [ ] negative [ ] itchy eyes [ -] nasal discharge [ ] hives [ ] angioedema  [x ] All other systems negative  [ ] Unable to assess ROS because ________    OBJECTIVE:  ICU Vital Signs Last 24 Hrs  T(C): 36.4 (11 May 2022 05:17), Max: 37 (11 May 2022 00:19)  T(F): 97.6 (11 May 2022 05:17), Max: 98.6 (11 May 2022 00:19)  HR: 84 (11 May 2022 08:26) (76 - 109)  BP: 131/77 (11 May 2022 05:17) (128/72 - 144/75)  BP(mean): --  ABP: --  ABP(mean): --  RR: 20 (11 May 2022 05:17) (18 - 20)  SpO2: 93% (11 May 2022 08:26) (92% - 97%)        05-10 @ 07:01  -  05-11 @ 07:00  --------------------------------------------------------  IN: 900 mL / OUT: 925 mL / NET: -25 mL      CAPILLARY BLOOD GLUCOSE      POCT Blood Glucose.: 217 mg/dL (11 May 2022 07:48)      PHYSICAL EXAM:  General: NAD, speaking in full sentences, no accessory muscle use   HEENT: PERRLA  Lymph Nodes: No palpable cervical LNs  Neck: Supple, no JVD  Respiratory: Minimal wheezing in b/l lung fields, no crackles   Cardiovascular: RRR, S1+S2+0  Abdomen: Soft, NT, ND, BS+   Extremities: No edema, pulses + b/l LEs  Skin: No rash, chronic venous changes in b/l LEs  Neurological: AAOx3, grossly non focal   Psychiatry: Normal mood     HOSPITAL MEDICATIONS:  aspirin  chewable 81 milliGRAM(s) Oral daily    azithromycin  IVPB      azithromycin  IVPB 500 milliGRAM(s) IV Intermittent every 24 hours      dextrose 50% Injectable 25 Gram(s) IV Push once  dextrose Oral Gel 15 Gram(s) Oral once PRN  glucagon  Injectable 1 milliGRAM(s) IntraMuscular once  insulin lispro (ADMELOG) corrective regimen sliding scale   SubCutaneous three times a day before meals  methylPREDNISolone sodium succinate Injectable 20 milliGRAM(s) IV Push every 8 hours    albuterol/ipratropium for Nebulization 3 milliLiter(s) Nebulizer every 6 hours  budesonide 160 MICROgram(s)/formoterol 4.5 MICROgram(s) Inhaler 2 Puff(s) Inhalation two times a day  guaiFENesin Oral Liquid (Sugar-Free) 200 milliGRAM(s) Oral every 6 hours PRN  sodium chloride 3%  Inhalation 4 milliLiter(s) Inhalation every 6 hours    acetaminophen     Tablet .. 650 milliGRAM(s) Oral every 6 hours PRN  melatonin 3 milliGRAM(s) Oral at bedtime PRN  ondansetron Injectable 4 milliGRAM(s) IV Push every 8 hours PRN    aluminum hydroxide/magnesium hydroxide/simethicone Suspension 30 milliLiter(s) Oral every 4 hours PRN        dextrose 5%. 1000 milliLiter(s) IV Continuous <Continuous>        nicotine - 21 mG/24Hr(s) Patch 1 patch Transdermal daily      LABS:                        18.1   5.88  )-----------( 122      ( 11 May 2022 07:10 )             60.9     Hgb Trend: 18.1<--, 18.4<--, 19.3<--  05-11    139  |  93<L>  |  21  ----------------------------<  188<H>  4.7   |  41<H>  |  0.45<L>    Ca    8.7      11 May 2022 07:10  Phos  2.6     05-11  Mg     2.1     05-11    TPro  6.6  /  Alb  2.7<L>  /  TBili  0.4  /  DBili  x   /  AST  16  /  ALT  35  /  AlkPhos  46  05-11    Creatinine Trend: 0.45<--, 0.58<--, 0.68<--      Arterial Blood Gas:  05-11 @ 05:41  7.35/86/119/48/97.1/17.3  ABG lactate: --  Arterial Blood Gas:  05-10 @ 15:31  7.30/87/78/43/95.3/12.4  ABG lactate: --        MICROBIOLOGY:   RVP: + parainfluenza 4   SARS COV2 : negative     RADIOLOGY:  [x ] Reviewed and interpreted by me    PULMONARY FUNCTION TESTS:  None   EKG:

## 2022-05-11 NOTE — PROGRESS NOTE ADULT - ASSESSMENT
61-year old M current 45 pack year smoker with a PMH of HTN, type 2 DM, OA, reported COPD on home O2 ( however never had formal diagnosis with PFTs), last hospitalized in 10/2019 and noted to have polycythemia presumed to be 2/2 to chronic hypoxia, now p/w SOB found to have acute on chronic resp failure with hypoxia and hypercapnia in the setting of a parainfluenza infection with bronchiolitis.     1. Acute on chronic resp failure with hypoxia and hypercapnia/ Parainfluenza bronchiolitis:  -  Clinically improving, currently on 4 L NC with O2 sat 97 %   - Used nocturnal BiPAP 10/5/40 %, ABG this am with progressive hypercapnia (7.35/86) however pt is awake/ alert   - Cont nocturnal BiPAP from 10 pm - 6 am and prn during the day  - Would adjust BiPAP settings to 14/7/ 30 %  - Cont Solumedrol 20 mg IV Q8H tpday. IF he remains stable can likely transition to Prednisone 40 mg daily tomorrow   - Cont nebulized hypertonic saline Q6H preceded by Duonebs Q6H and give pt an acapella valve Q6H to assist with mobilization of secretions   - Can transition to Symbicort BID and Spiriva daily once no longer requiring standing nebs.   - Suggest Tessalon perle Q8H prn for cough   - Add Flonase BID for post nasal drip  - Can complete a 5 day course of Azithromycin for possible superimposed bacterial bronchitis.   - Polycythemia may be secondary to chronic hypoxia as pt reports he does not use O2 during the day. Will require an outpt TTE with bubble study to r/o PFO along with outpt heme FUP for work up if polycythemia worsens/ fails to improve despite correction of chronic hypoxia   - No clinical indication to warrant therapeutic phlebotomy at present. Infact this will likely worsen his resp symptoms and hypoxia   - Incentive spirometry   - Cont  Nicotine patch   - Keep O2 sat > 88 %. Requires home O2 upon d-c.   - Pt does not have a portable O2 tank, please ensure that this is set up prior to d-c  - He will require outpatient pulmonary follow with PFTs, a sleep study and repeat CT chest.   - Please call [phone] to set up an appointment for him to see Dr. Candice Barboza at the [facility] Pulmonary office located at [address] [place], [place] [id number].   - Plan discussed with Dr. Uriostegui and the patient.

## 2022-05-11 NOTE — CONSULT NOTE ADULT - ASSESSMENT
60yo M with PMH of COPD (on 3L home O2), smoker (4 ppd x 41 years), DM, presents to ED with SOB and dizziness. Pt states he had been having worsening dyspnea since 2 days ago, with dizziness and loss of balance. Hypoxic on arrival to 70s O2sat on RA.  Placed on BIPAP in ED with improvement in saturation and work of breathing. ICU consulted for acute on chronic hypoxic respiratory failure.     --Pt currently with no signs of respiratory distress, breathing comfortably with BIPAP mask on, speaking full sentences. SpO2 94% at time of exam.   --History of prior admission (latest in Oct 2019) for hypoxic respiratory failure, with chronic hypercapnic resp fail. Unclear etiology on prior workup, no PE or parenchymal lung disease at the time. Pt was discharged home on 4L O2, states he never followed up with pulmonology afterwards. Has continued smoking.   --Possible COPD exacerbation in setting of parainfluenza+. CXR with no significant infiltrates or fluid overload. CTA pending r/o PE. Recommend supportive care, nebs, steroids, pulmonology consult.   --Polycythemia likely 2/2 chronic hypoxemia, COPD. Recommend heme consult.   --BNP elevated; pt denies any hx of CHF. Recommend TTE.   --Tobacco cessation counseling given. Pt states he quit smoking 2 weeks ago.   --Pt does not need ICU level of care at this time. Please re-consult if needed.  --Pt expresses he wishes to be DNR/DNI; states he has DNR filled out. Discussed with ED physician. 
61-year old M current 45 pack year smoker with a PMH of HTN, type 2 DM, OA, reported COPD on home O2 ( however never had formal diagnosis with PFTs), last hospitalized in 10/2019 and noted to have polycythemia presumed to be 2/2 to chronic hypoxia, now p/w SOB found to have acute on chronic resp failure with hypoxia and hypercapnia in the setting of a parainfluenza infection with bronchiolitis.     1. Acute on chronic resp failure with hypoxia and hypercapnia/ Parainfluenza bronchiolitis:  -  Clinically improving, now on NC with O2 sat 95 % on 2 L NC  - Used nocturnal BiPAP 10/5/40 %, no ABG this am but serum bicarb is trending up ( 37 today) which may indicate progressive hypercapnia   - Check ABG this am  - Cont nocturnal BiPAP from 10 pm - 6 am and prn during the day  - Would adjust BiPAP settings to 12/5/ 40 %  - Wheezing likely 2/2 parainfluenza associated bronchiolitis. Agree with Solumedrol IV, would suggest reducing dose to 20 mg IV Q8H  - Please start nebulized hypertonic saline Q6H preceded by Duonebs Q6H  - Can transition to Symbicort BID and Spiriva daily once no longer requiring standing nebs.   - Please give pt an acapella valve to assist with mobilization of secretions   - Suggest Tessalon perle Q8H prn for cough   - Can complete a 5 day course of Azithromycin for possible superimposed bacterial bronchitis. Thoracic POCUS without any obvious consolidations to suggest bacterial PNA, and pro noam only 0.09. Cont to monitor temp curve and WBC.   - Although he is a heavy smoker and is documented to have "COPD" in the chart. however please note that he has never had PFT testing to establish a formal diagnosis of obstructive lung disease and CT chest does not show evidence of emphysema.  - Polycythemia may be secondary to chronic hypoxia as pt reports he does not use O2 during the day. However, he needs outpt heme FUP with a heme work up if polycythemia worsens despite correction of chronic hypoxia   - TTE in 2019 was normal however a bubble study was not done at that time. He should have a repeat TTE with bubble study to r/o a PFO as an outpt   - Incentive spirometry   - Keep O2 sat > 88 %  - Requires a Nicotine patch   - I counseled the patient extensively on the importance of outpatient pulmonary follow up and consistent use of supplemental O2. He was also counseled on the danger of smoking when using O2. PT does not have a portable O2 tank, please ensure that this is set up prior to d-c  - Smoking cessation counseling performed. Pt will try nicotine patch. Time spent on smoking cessation counseling 10 mins   - He will require outpatient pulmonary follow with PFTs, a sleep study and repeat CT chest.   - Please call [phone] to set up an appointment for him to see Dr. Candice Barboza at the [facility] Pulmonary office located at [address] [place] [address].   - Plan discussed with Dr. Uriostegui and the patient.     
Polycythemia

## 2022-05-11 NOTE — CONSULT NOTE ADULT - PROBLEM SELECTOR RECOMMENDATION 9
- Patient probably has secondary polycythemia due to chronic hypoxemia, will need outpatient work-up for MPD    - please d/w pulmonary if they feel patient may benefit from Theraputic phlebotomy as some times it may benefit patients breathing status; as secondary polycythemia need to be careful not to correct Hct too Quickly as may lead to worsening respiratory status - could be done as inpatient while patient is here via [facility]    - baby asprin    - DVT prophylaxsis    - physical therapy when able

## 2022-05-11 NOTE — CONSULT NOTE ADULT - SUBJECTIVE AND OBJECTIVE BOX
HPI: 62yo M with PMH of COPD (on 3L home O2), smoker (4ppd x 41 years, states he quit a couple weeks ago), DM, presents to ED with SOB and dizziness. Pt states he had been having worsening dyspnea since 2 days ago, with dizziness and loss of balance. Denies any fevers or chills. Denies CP, denies numbness/weakness/tingling in extremities. Reports cough (not worse than baseline), productive of yellow sputum. Denies abdominal pain, n/v/d. No recent travel, no sick contacts. Pfizer vaccinated x3. Works as a .   Found in ED to be cyanotic, in respiratory distress and hypoxic to sat 70s on RA, improved with 100% NRB. Placed on BIPAP in ED with improvement in saturation and work of breathing. ICU consulted for hypoxic respiratory failure.     Allergies  No Known Allergies    MEDICATIONS  NEURO  Meds:   RESPIRATORY  ABG - ( 09 May 2022 01:00 )  pH: x     /  pCO2: x     /  pO2: x     / HCO3: x     / Base Excess: x     /  SaO2: x       Lactate: 2.4      Drug Dosing Weight  Height (cm): 172.7 (08 May 2022 23:58)  Weight (kg): 80.7 (09 May 2022 00:06)  BMI (kg/m2): 27.1 (09 May 2022 00:06)  BSA (m2): 1.94 (09 May 2022 00:06)    PAST MEDICAL & SURGICAL HISTORY:  DM (diabetes mellitus)  Chronic obstructive pulmonary disease, unspecified COPD type  History of hernia surgery    SOCIAL HISTORY: Lives alone at home. Quit smoking. Denies EtOH or illicit drug use.     ADVANCE DIRECTIVES: DNR/DNI    REVIEW OF SYSTEMS: as reported in HPI    Vital Signs Last 24 Hrs  T(C): 37.7 (09 May 2022 01:43), Max: 37.7 (09 May 2022 01:43)  T(F): 99.9 (09 May 2022 01:43), Max: 99.9 (09 May 2022 01:43)  HR: 101 (09 May 2022 03:57) (98 - 119)  BP: 133/78 (09 May 2022 03:57) (123/61 - 133/78)  RR: 26 (09 May 2022 03:57) (25 - 30)  SpO2: 94% (09 May 2022 03:57) (70% - 100%)    ABG - ( 09 May 2022 00:31 )  pH, Arterial: 7.32  pH, Blood: x     /  pCO2: 66    /  pO2: 99    / HCO3: 34    / Base Excess: 5.7   /  SaO2: 96.8      PHYSICAL EXAM  GENERAL: NAD, BIPAP mask on. No signs of respiratory distress, breathing comfortably, talking in full sentences. No cyanosis.  HEENT: NCAT. Flushed face. PERRL, EOMI, dry mucous membranes. Neck supple, FROM.   NERVOUS SYSTEM:  AAOx3, good concentration, moving all four extremities, no focal neuro deficits; strength & sensation intact in b/l upper & lower extremities.  CHEST/LUNG: Diminished breath sounds, no w/r/r  HEART: +S1S2, RRR, no m/r/g  ABDOMEN: Soft, nontender, nondistended; +BS  EXTREMITIES:  2+ peripheral pulses; +clubbing, no cyanosis or edema. Cap refill <2sec.   SKIN: Dry, warm to touch, no rashes or lesions    LABS:                        19.3   8.26  )-----------( 120      ( 09 May 2022 01:00 )             63.0   05-09    135  |  97  |  27<H>  ----------------------------<  123<H>  5.0   |  30  |  0.68    Ca    8.8      09 May 2022 01:00    TPro  7.6  /  Alb  3.1<L>  /  TBili  1.0  /  DBili  x   /  AST  43<H>  /  ALT  34  /  AlkPhos  56  05-09    PT/INR - ( 09 May 2022 01:00 )   PT: 13.5 sec;   INR: 1.13 ratio    PTT - ( 09 May 2022 01:00 )  PTT:37.0 sec    Respiratory Viral Panel with COVID-19 by ANSHUL (05.09.22 @ 01:00)    Rapid RVP Result: Detected    SARS-CoV-2: NotDetec    Parainfluenza 4 (RapRVP): Detected      CRITICAL CARE TIME SPENT: 30 minutes  
CHIEF COMPLAINT: SOB    HPI: 61 year old M current 45 pack year smoker with a PMH of HTN, type 2 DM, OA, reported COPD on home O2 ( never had formal diagnosis with PFTs), last hospitalized in 10/2019 and noted to have polycythemia presumed to be 2/2 to chronic hypoxia, now p/w SOB. Patient reports that he was in his usual state of health until 2 days ago when he developed acute onset of subjective fever, chills, body aches and progressively worsening SOB with wheezing both at rest and with minimal exertion. He also endorses a cough with intermittent production of thick yellow sputum. He feels like his chest is congested but reports having trouble expectorating. Denies CP, orthopnea, PND, LE edema, abd pain, N/V/D or dysuria. 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 At baseline he reports he can walk more than 3 blocks and climb a flight of stairs without any SOB. He was told he needs to use O2 around the clock but only uses it at night because he does not have a portable tank to use while driving his truck. He has never seen a pulmonologist and reports that he attempted to quit smoking 2 weeks ago but had a few cigarettes with friends last week. Pt reports that he drove himself to the ED due to worsening SOB. On presentation to the ED his O2 sat was 70 % RA. ABG hypercapnia ( 7.32/66/99 on 40 %), RVP was + for parainfluenza 4, labs showed a H/H of 19.3/63 and a proBNP of 2089. CXR grossly clear, CTPA showing RLL small  tree in bud opacities, no PE. Pt has been treated with IV solumedrol, bronchodilators and Bilevel support. Pulm consulted for further management.     PAST MEDICAL & SURGICAL HISTORY:  DM (diabetes mellitus)    Chronic obstructive pulmonary disease, unspecified COPD type    History of hernia surgery        FAMILY HISTORY:  Father/ Brother with HTN/ DM type. Sister  of brain cancer. No FH of lung cancer. No other sig FH in first degree relatives     SOCIAL HISTORY:  Smoking: [ ] Never Smoked [ ] Former Smoker (__ packs x ___ years) [x ] Current Smoker  ( 1.5__ packs x 30___ years)  Substance Use: [x ] Never Used [ ] Used ____  EtOH Use: Social   Marital Status: [ ] Single [ ]  [ ]  [x ]   Sexual History: NC  Occupation:    Recent Travel: None  Advance Directives: Full code     Allergies    No Known Allergies    Intolerances        HOME MEDICATIONS:  Reviewed, pulm meds: Albuterol HFA prn, Symbicort BID   REVIEW OF SYSTEMS:  Constitutional: [ ] negative [+] fevers [+ ] chills [+ ] weight loss [ ] weight gain  HEENT: [ ] negative [- ] dry eyes [- ] eye irritation [+ ] postnasal drip [- ] nasal congestion  CV: [ ] negative  [- ] chest pain [- ] orthopnea [- ] palpitations [ ] murmur  Resp: [ ] negative [+ ] cough [+ ] shortness of breath [ ] dyspnea [+] wheezing [+ ] sputum [ ] hemoptysis  GI: [ ] negative [- ] nausea [- ] vomiting [ -] diarrhea [ ] constipation [- ] abd pain [ ] dysphagia   : [ ] negative [- ] dysuria [- ] nocturia [- ] hematuria [ ] increased urinary frequency  Musculoskeletal: [ ] negative [- ] back pain [- ] myalgias [ ] arthralgias [ ] fracture  Skin: [ ] negative [- ] rash [ ] itch  Neurological: [ ] negative [- ] headache [- ] dizziness [- ] syncope [ ] weakness [ ] numbness  Psychiatric: [ ] negative [- ] anxiety [ ] depression  Endocrine: [ ] negative [- ] diabetes [ ] thyroid problem  Hematologic/Lymphatic: [ ] negative [ -] anemia [- ] bleeding problem  Allergic/Immunologic: [ ] negative [ ] itchy eyes [ -] nasal discharge [ ] hives [ ] angioedema  [x ] All other systems negative  [ ] Unable to assess ROS because ________      OBJECTIVE:  ICU Vital Signs Last 24 Hrs  T(C): 36.7 (10 May 2022 05:45), Max: 36.8 (09 May 2022 17:18)  T(F): 98 (10 May 2022 05:45), Max: 98.3 (09 May 2022 17:18)  HR: 91 (10 May 2022 08:39) (80 - 102)  BP: 131/73 (10 May 2022 05:45) (126/79 - 131/74)  BP(mean): --  ABP: --  ABP(mean): --  RR: 20 (10 May 2022 05:45) (18 - 20)  SpO2: 92% (10 May 2022 08:39) (90% - 96%)        05- @ 07:01  -  05-10 @ 07:00  --------------------------------------------------------  IN: 0 mL / OUT: 1800 mL / NET: -1800 mL      CAPILLARY BLOOD GLUCOSE      POCT Blood Glucose.: 184 mg/dL (10 May 2022 08:16)      PHYSICAL EXAM:  General: NAD, speaking in full sentences, no accessory muscle use   HEENT: PERRLA  Lymph Nodes: No palpable cervical LNs  Neck: Supple, no JVD  Respiratory: Scattered b/l wheezing through out both lung fields, no crackles   Cardiovascular: RRR, S1+S2+0  Abdomen: Soft, NT, ND, BS+   Extremities: No edema, pulses + b/l LEs  Skin: No rash, chronic venous changes in b/l LEs  Neurological: AAOx3, grossly non focal   Psychiatry: Normal mood       HOSPITAL MEDICATIONS:  aspirin  chewable 81 milliGRAM(s) Oral daily    azithromycin  IVPB      azithromycin  IVPB 500 milliGRAM(s) IV Intermittent every 24 hours      dextrose 50% Injectable 25 Gram(s) IV Push once  dextrose Oral Gel 15 Gram(s) Oral once PRN  glucagon  Injectable 1 milliGRAM(s) IntraMuscular once  insulin lispro (ADMELOG) corrective regimen sliding scale   SubCutaneous three times a day before meals    ALBUTerol    90 MICROgram(s) HFA Inhaler 2 Puff(s) Inhalation every 6 hours  budesonide 160 MICROgram(s)/formoterol 4.5 MICROgram(s) Inhaler 2 Puff(s) Inhalation two times a day  guaiFENesin Oral Liquid (Sugar-Free) 200 milliGRAM(s) Oral every 6 hours PRN  tiotropium 18 MICROgram(s) Capsule 1 Capsule(s) Inhalation daily    acetaminophen     Tablet .. 650 milliGRAM(s) Oral every 6 hours PRN  melatonin 3 milliGRAM(s) Oral at bedtime PRN  ondansetron Injectable 4 milliGRAM(s) IV Push every 8 hours PRN    aluminum hydroxide/magnesium hydroxide/simethicone Suspension 30 milliLiter(s) Oral every 4 hours PRN        dextrose 5%. 1000 milliLiter(s) IV Continuous <Continuous>            LABS:                        18.4   6.64  )-----------( 119      ( 10 May 2022 06:56 )             60.8     Hgb Trend: 18.4<--, 19.3<--  05-10    139  |  98  |  32<H>  ----------------------------<  179<H>  4.9   |  37<H>  |  0.58    Ca    8.9      10 May 2022 06:56    TPro  7.0  /  Alb  2.9<L>  /  TBili  0.4  /  DBili  x   /  AST  14<L>  /  ALT  32  /  AlkPhos  51  0510    Creatinine Trend: 0.58<--, 0.68<--  PT/INR - ( 09 May 2022 01:00 )   PT: 13.5 sec;   INR: 1.13 ratio         PTT - ( 09 May 2022 01:00 )  PTT:37.0 sec    Arterial Blood Gas:   @ 00:31  7.32/66/99/34/96.8/5.7  ABG lactate: --        MICROBIOLOGY:   Respiratory Viral Panel with COVID-19 by ANSHUL (22 @ 01:00)   Rapid RVP Result: Detected: + Parainfluenza   SARS-CoV-2: [facility]    RADIOLOGY:    < from: CT Angio Chest PE Protocol w/ IV Cont (22 @ 03:56) >  CHEST:  LUNGS, PLEURA AND LARGE AIRWAYS: Patent central airways.  Suggestion of   tree-in-bud nodules in right lower lobe. Scarring or 8mm right lower lobe   nodule on image 120 of series 7. No consolidation or pleural effusion.  VESSELS: No pulmonary embolism. Atherosclerotic changes.  HEART: Heart size is normal. No pericardial effusion. Coronary artery   calcifications.  MEDIASTINUM AND YIN: No lymphadenopathy.  CHEST WALL AND LOWER NECK: Within normal limits.  VISUALIZED UPPER ABDOMEN: Bilateral perinephric stranding without   hydronephrosis. 1.7 cmright renal cyst of higher attenuation than simple   fluid for which nonemergent renal ultrasound correlation is advised.   Bilateral adrenal nodular thickening. Partially imaged diastases of the   left anterior abdominal wall.  BONES: Multilevel degenerative changes of the spine.    IMPRESSION:    No pulmonary embolism.    Suggestion of tree-in-bud nodules in right lower lobe. Findings are   nonspecific, atypical infection or small airway disease considered.   Short-term pulmonary imaging follow-up in 6 weeks advised to demonstrate   resolution. No consolidation or pleural effusion.    Scarring or sub-cm right lower lobe nodule. Pulmonary imaging follow-up   in one year is advised demonstrate stability.      < from: Xray Chest 1 View- PORTABLE-Urgent (Xray Chest 1 View- PORTABLE-Urgent .) (22 @ 00:58) >  Heart magnified by technique.    Lungs remain clear.    Chest is similar to 2019.    IMPRESSION: No acute finding or change.      [x ] Reviewed and interpreted by me    PULMONARY FUNCTION TESTS:    EKG:
Reason for Consultation: Polycythemia    HPI: Patient is a 61y Male seen on consultatioin for the evaluation and management of Polycythemia    Patient 61 year old male with history of COPD, was recently told had "thick blood" and needed to see hematology, never had a chance to see one, patient presenting with sob worsening for several days, found to be hypoxic 60% on RA. Parainfluenxa +. denies f/c, h/a, sputum/cough, sick contacts, cp palpitations, orthopnea, pnd, le edema. CTA chest (-) PE or consolidation.  started on oxygen in hospital with H/H coming down slightly, denies itching    PAST MEDICAL & SURGICAL HISTORY:  DM (diabetes mellitus)      Chronic obstructive pulmonary disease, unspecified COPD type      History of hernia surgery      MEDICATIONS  (STANDING):  albuterol/ipratropium for Nebulization 3 milliLiter(s) Nebulizer every 6 hours  aspirin  chewable 81 milliGRAM(s) Oral daily  azithromycin  IVPB      azithromycin  IVPB 500 milliGRAM(s) IV Intermittent every 24 hours  budesonide 160 MICROgram(s)/formoterol 4.5 MICROgram(s) Inhaler 2 Puff(s) Inhalation two times a day  dextrose 5%. 1000 milliLiter(s) (50 mL/Hr) IV Continuous <Continuous>  dextrose 50% Injectable 25 Gram(s) IV Push once  glucagon  Injectable 1 milliGRAM(s) IntraMuscular once  insulin lispro (ADMELOG) corrective regimen sliding scale   SubCutaneous three times a day before meals  methylPREDNISolone sodium succinate Injectable 20 milliGRAM(s) IV Push every 8 hours  nicotine - 21 mG/24Hr(s) Patch 1 patch Transdermal daily  sodium chloride 3%  Inhalation 4 milliLiter(s) Inhalation every 6 hours    MEDICATIONS  (PRN):  acetaminophen     Tablet .. 650 milliGRAM(s) Oral every 6 hours PRN Temp greater or equal to 38C (100.4F), Mild Pain (1 - 3)  aluminum hydroxide/magnesium hydroxide/simethicone Suspension 30 milliLiter(s) Oral every 4 hours PRN Dyspepsia  dextrose Oral Gel 15 Gram(s) Oral once PRN Blood Glucose LESS THAN 70 milliGRAM(s)/deciliter  guaiFENesin Oral Liquid (Sugar-Free) 200 milliGRAM(s) Oral every 6 hours PRN Cough  melatonin 3 milliGRAM(s) Oral at bedtime PRN Insomnia  ondansetron Injectable 4 milliGRAM(s) IV Push every 8 hours PRN Nausea and/or Vomiting      Allergies    No Known Allergies    Intolerances        SOCIAL HISTORY:    Smoking Status: quit recently  Alcohol: no  Occupation: not currently        Vital Signs Last 24 Hrs  T(C): 36.4 (11 May 2022 05:17), Max: 37 (11 May 2022 00:19)  T(F): 97.6 (11 May 2022 05:17), Max: 98.6 (11 May 2022 00:19)  HR: 84 (11 May 2022 08:26) (76 - 109)  BP: 131/77 (11 May 2022 05:17) (128/72 - 144/75)  BP(mean): --  RR: 20 (11 May 2022 05:17) (18 - 20)  SpO2: 93% (11 May 2022 08:26) (92% - 97%)    PHYSICAL EXAM:    general - AAO x 3  HEENT - No Icterus  CVS - RRR  RS - AE B/L  Abd - soft, NT  Ext - Pulses +        LABS:                        18.1   5.88  )-----------( 122      ( 11 May 2022 07:10 )             60.9     05-11    139  |  93<L>  |  21  ----------------------------<  188<H>  4.7   |  41<H>  |  0.45<L>    Ca    8.7      11 May 2022 07:10  Phos  2.6     05-11  Mg     2.1     05-11    TPro  6.6  /  Alb  2.7<L>  /  TBili  0.4  /  DBili  x   /  AST  16  /  ALT  35  /  AlkPhos  46  05-11          Culture - Blood (collected 09 May 2022 12:19)  Source: .Blood Blood-Peripheral  Preliminary Report (10 May 2022 13:02):    No growth to date.    Culture - Blood (collected 09 May 2022 12:19)  Source: .Blood Blood-Peripheral  Preliminary Report (10 May 2022 13:02):    No growth to date.

## 2022-05-11 NOTE — PROGRESS NOTE ADULT - SUBJECTIVE AND OBJECTIVE BOX
HPI:  This is a 62 yo M with Hx of HTN, DM, OA, COPD (not on home o2), chronic smoker, polycythemia presenting with sob worsening for several days, found to be hypoxic 60% on RA. Parainfluenxa +. denies f/c, h/a, sputum/cough, sick contacts, cp palpitations, orthopnea, pnd, le edema. CTA chest (-) PE or consolidation. labs significant for Hgb 19.3 BNP 2089 abg ph 7.32 co2 66  placed on bipap 40% FIO2    (09 May 2022 06:56)    Patient is a 61y old  Male who presents with a chief complaint of Shortness of breath (10 May 2022 18:10)      INTERVAL HPI/OVERNIGHT EVENTS: no acute events feels better however retaining co2     MEDICATIONS  (STANDING):  albuterol/ipratropium for Nebulization 3 milliLiter(s) Nebulizer every 6 hours  aspirin  chewable 81 milliGRAM(s) Oral daily  azithromycin  IVPB      azithromycin  IVPB 500 milliGRAM(s) IV Intermittent every 24 hours  budesonide 160 MICROgram(s)/formoterol 4.5 MICROgram(s) Inhaler 2 Puff(s) Inhalation two times a day  dextrose 5%. 1000 milliLiter(s) (50 mL/Hr) IV Continuous <Continuous>  dextrose 50% Injectable 25 Gram(s) IV Push once  enoxaparin Injectable 40 milliGRAM(s) SubCutaneous every 24 hours  glucagon  Injectable 1 milliGRAM(s) IntraMuscular once  insulin lispro (ADMELOG) corrective regimen sliding scale   SubCutaneous three times a day before meals  methylPREDNISolone sodium succinate Injectable 20 milliGRAM(s) IV Push every 8 hours  nicotine - 21 mG/24Hr(s) Patch 1 patch Transdermal daily  sodium chloride 3%  Inhalation 4 milliLiter(s) Inhalation every 6 hours    MEDICATIONS  (PRN):  acetaminophen     Tablet .. 650 milliGRAM(s) Oral every 6 hours PRN Temp greater or equal to 38C (100.4F), Mild Pain (1 - 3)  aluminum hydroxide/magnesium hydroxide/simethicone Suspension 30 milliLiter(s) Oral every 4 hours PRN Dyspepsia  dextrose Oral Gel 15 Gram(s) Oral once PRN Blood Glucose LESS THAN 70 milliGRAM(s)/deciliter  guaiFENesin Oral Liquid (Sugar-Free) 200 milliGRAM(s) Oral every 6 hours PRN Cough  melatonin 3 milliGRAM(s) Oral at bedtime PRN Insomnia  ondansetron Injectable 4 milliGRAM(s) IV Push every 8 hours PRN Nausea and/or Vomiting      Allergies    No Known Allergies    Intolerances        REVIEW OF SYSTEMS:  CONSTITUTIONAL: No fever, weight loss, or fatigue  EYES: No eye pain, visual disturbances, or discharge  ENMT:  No difficulty hearing, tinnitus, vertigo; No sinus or throat pain  NECK: No pain or stiffness  BREASTS: No pain, masses, or nipple discharge  RESPIRATORY: shortness of breath  CARDIOVASCULAR: No chest pain, palpitations, dizziness, or leg swelling  GASTROINTESTINAL: No abdominal or epigastric pain. No nausea, vomiting, or hematemesis; No diarrhea or constipation. No melena or hematochezia.  GENITOURINARY: No dysuria, frequency, hematuria, or incontinence  NEUROLOGICAL: No headaches, memory loss, loss of strength, numbness, or tremors  SKIN: No itching, burning, rashes, or lesions   LYMPH NODES: No enlarged glands  ENDOCRINE: No heat or cold intolerance; No hair loss  MUSCULOSKELETAL: No joint pain or swelling; No muscle, back, or extremity pain  PSYCHIATRIC: No depression, anxiety, mood swings, or difficulty sleeping  HEME/LYMPH: No easy bruising, or bleeding gums  ALLERGY AND IMMUNOLOGIC: No hives or eczema    Vital Signs Last 24 Hrs  T(C): 36.7 (11 May 2022 10:39), Max: 37 (11 May 2022 00:19)  T(F): 98 (11 May 2022 10:39), Max: 98.6 (11 May 2022 00:19)  HR: 95 (11 May 2022 16:50) (76 - 109)  BP: 147/76 (11 May 2022 10:39) (131/77 - 147/76)  RR: 20 (11 May 2022 10:39) (20 - 20)  SpO2: 93% (11 May 2022 16:50) (92% - 97%)    PHYSICAL EXAM:  GENERAL: NAD, well-groomed, well-developed  HEAD:  Atraumatic, Normocephalic  EYES: EOMI, PERRLA, conjunctiva and sclera clear  ENMT: No tonsillar erythema, exudates, or enlargement; Moist mucous membranes, Good dentition, No lesions  NECK: Supple, No JVD, Normal thyroid  NERVOUS SYSTEM:  Alert & Oriented X3, Good concentration; Motor Strength 5/5 B/L upper and lower extremities; DTRs 2+ intact and symmetric  CHEST/LUNG: Clear to percussion bilaterally; No rales, rhonchi, wheezing, or rubs  HEART: Regular rate and rhythm; No murmurs, rubs, or gallops  ABDOMEN: Soft, Nontender, Nondistended; Bowel sounds present  EXTREMITIES:  2+ Peripheral Pulses, No clubbing, cyanosis, or edema  LYMPH: No lymphadenopathy noted  SKIN: No rashes or lesions    LABS:                        18.1   5.88  )-----------( 122      ( 11 May 2022 07:10 )             60.9     05-11    139  |  93<L>  |  21  ----------------------------<  188<H>  4.7   |  41<H>  |  0.45<L>    Ca    8.7      11 May 2022 07:10  Phos  2.6     05-11  Mg     2.1     05-11    TPro  6.6  /  Alb  2.7<L>  /  TBili  0.4  /  DBili  x   /  AST  16  /  ALT  35  /  AlkPhos  46  05-11    Blood Gas Profile - Arterial in AM (05.11.22 @ 05:41)    pH, Arterial: 7.35    pCO2, Arterial: 86: TYPE:(C=Critical, N=Notification, A=Abnormal) c  TESTS: _ph, and hco3  DATE/TIME CALLED: 05/11/2022 05:56:01 EDT  CALLED TO: hilda ortez  READ BACK (2 Patient Identifiers)(Y/N): y  READ BACK VALUES (Y/N): y  CALLED BY: dayanna lópez rrt mmHg    pO2, Arterial: 119 mmHg    HCO3, Arterial: 48 mmol/L    Base Excess, Arterial: 17.3 mmol/L    Oxygen Saturation, Arterial: 97.1 %    Total CO2, Arterial: 50 mmol/L    FIO2, Arterial: 40    Blood Gas Comments Arterial: hematoma noted    Blood Gas Source Arterial: Arterial          CAPILLARY BLOOD GLUCOSE      POCT Blood Glucose.: 221 mg/dL (11 May 2022 17:11)  POCT Blood Glucose.: 224 mg/dL (11 May 2022 11:44)  POCT Blood Glucose.: 217 mg/dL (11 May 2022 07:48)      RADIOLOGY & ADDITIONAL TESTS:    Imaging Personally Reviewed:  [ X] YES  [ ] NO    Consultant(s) Notes Reviewed:  [X ] YES  [ ] NO    Care Discussed with Consultants/Other Providers [ X] YES  [ ] NO

## 2022-05-12 LAB
ALBUMIN SERPL ELPH-MCNC: 3.2 G/DL — LOW (ref 3.3–5)
ALP SERPL-CCNC: 56 U/L — SIGNIFICANT CHANGE UP (ref 40–120)
ALT FLD-CCNC: 41 U/L — SIGNIFICANT CHANGE UP (ref 12–78)
ANION GAP SERPL CALC-SCNC: 2 MMOL/L — LOW (ref 5–17)
AST SERPL-CCNC: 16 U/L — SIGNIFICANT CHANGE UP (ref 15–37)
BASE EXCESS BLDA CALC-SCNC: 19.2 MMOL/L — HIGH (ref -2–3)
BILIRUB SERPL-MCNC: 0.7 MG/DL — SIGNIFICANT CHANGE UP (ref 0.2–1.2)
BLOOD GAS COMMENTS ARTERIAL: SIGNIFICANT CHANGE UP
BLOOD GAS COMMENTS ARTERIAL: SIGNIFICANT CHANGE UP
BUN SERPL-MCNC: 26 MG/DL — HIGH (ref 7–23)
CALCIUM SERPL-MCNC: 9.2 MG/DL — SIGNIFICANT CHANGE UP (ref 8.5–10.1)
CHLORIDE SERPL-SCNC: 90 MMOL/L — LOW (ref 96–108)
CO2 BLDA-SCNC: 52 MMOL/L — HIGH (ref 19–24)
CO2 SERPL-SCNC: 43 MMOL/L — HIGH (ref 22–31)
CREAT SERPL-MCNC: 0.63 MG/DL — SIGNIFICANT CHANGE UP (ref 0.5–1.3)
EGFR: 108 ML/MIN/1.73M2 — SIGNIFICANT CHANGE UP
GAS PNL BLDA: SIGNIFICANT CHANGE UP
GLUCOSE BLDC GLUCOMTR-MCNC: 233 MG/DL — HIGH (ref 70–99)
GLUCOSE BLDC GLUCOMTR-MCNC: 256 MG/DL — HIGH (ref 70–99)
GLUCOSE BLDC GLUCOMTR-MCNC: 261 MG/DL — HIGH (ref 70–99)
GLUCOSE BLDC GLUCOMTR-MCNC: 264 MG/DL — HIGH (ref 70–99)
GLUCOSE SERPL-MCNC: 283 MG/DL — HIGH (ref 70–99)
HCO3 BLDA-SCNC: 49 MMOL/L — CRITICAL HIGH (ref 21–28)
HCT VFR BLD CALC: 62.7 % — CRITICAL HIGH (ref 39–50)
HGB BLD-MCNC: 19.3 G/DL — CRITICAL HIGH (ref 13–17)
HOROWITZ INDEX BLDA+IHG-RTO: 36 — SIGNIFICANT CHANGE UP
MAGNESIUM SERPL-MCNC: 2.3 MG/DL — SIGNIFICANT CHANGE UP (ref 1.6–2.6)
MCHC RBC-ENTMCNC: 30.8 G/DL — LOW (ref 32–36)
MCHC RBC-ENTMCNC: 31.8 PG — SIGNIFICANT CHANGE UP (ref 27–34)
MCV RBC AUTO: 103.3 FL — HIGH (ref 80–100)
NRBC # BLD: 0 /100 WBCS — SIGNIFICANT CHANGE UP (ref 0–0)
PCO2 BLDA: 83 MMHG — CRITICAL HIGH (ref 32–46)
PH BLDA: 7.38 — SIGNIFICANT CHANGE UP (ref 7.35–7.45)
PHOSPHATE SERPL-MCNC: 3.3 MG/DL — SIGNIFICANT CHANGE UP (ref 2.5–4.5)
PLATELET # BLD AUTO: 139 K/UL — LOW (ref 150–400)
PO2 BLDA: 67 MMHG — LOW (ref 83–108)
POTASSIUM SERPL-MCNC: 5.2 MMOL/L — SIGNIFICANT CHANGE UP (ref 3.5–5.3)
POTASSIUM SERPL-SCNC: 5.2 MMOL/L — SIGNIFICANT CHANGE UP (ref 3.5–5.3)
PROT SERPL-MCNC: 7.5 GM/DL — SIGNIFICANT CHANGE UP (ref 6–8.3)
RBC # BLD: 6.07 M/UL — HIGH (ref 4.2–5.8)
RBC # FLD: 13.1 % — SIGNIFICANT CHANGE UP (ref 10.3–14.5)
SAO2 % BLDA: 92.9 % — LOW (ref 94–98)
SODIUM SERPL-SCNC: 135 MMOL/L — SIGNIFICANT CHANGE UP (ref 135–145)
WBC # BLD: 7.33 K/UL — SIGNIFICANT CHANGE UP (ref 3.8–10.5)
WBC # FLD AUTO: 7.33 K/UL — SIGNIFICANT CHANGE UP (ref 3.8–10.5)

## 2022-05-12 PROCEDURE — 93306 TTE W/DOPPLER COMPLETE: CPT | Mod: 26

## 2022-05-12 PROCEDURE — 99233 SBSQ HOSP IP/OBS HIGH 50: CPT

## 2022-05-12 RX ORDER — AZITHROMYCIN 500 MG/1
500 TABLET, FILM COATED ORAL DAILY
Refills: 0 | Status: DISCONTINUED | OUTPATIENT
Start: 2022-05-12 | End: 2022-05-13

## 2022-05-12 RX ADMIN — Medication 20 MILLIGRAM(S): at 21:56

## 2022-05-12 RX ADMIN — Medication 1 PATCH: at 07:52

## 2022-05-12 RX ADMIN — Medication 20 MILLIGRAM(S): at 06:34

## 2022-05-12 RX ADMIN — Medication 3 MILLILITER(S): at 23:38

## 2022-05-12 RX ADMIN — Medication 3: at 09:12

## 2022-05-12 RX ADMIN — Medication 3 MILLILITER(S): at 11:03

## 2022-05-12 RX ADMIN — Medication 1 PATCH: at 19:40

## 2022-05-12 RX ADMIN — Medication 3 MILLILITER(S): at 17:03

## 2022-05-12 RX ADMIN — Medication 20 MILLIGRAM(S): at 13:02

## 2022-05-12 RX ADMIN — Medication 3: at 17:13

## 2022-05-12 RX ADMIN — AZITHROMYCIN 255 MILLIGRAM(S): 500 TABLET, FILM COATED ORAL at 06:33

## 2022-05-12 RX ADMIN — Medication 3 MILLILITER(S): at 00:41

## 2022-05-12 RX ADMIN — Medication 1 PATCH: at 12:00

## 2022-05-12 RX ADMIN — Medication 200 MILLIGRAM(S): at 06:34

## 2022-05-12 RX ADMIN — Medication 81 MILLIGRAM(S): at 12:20

## 2022-05-12 RX ADMIN — Medication 3 MILLILITER(S): at 05:22

## 2022-05-12 RX ADMIN — BUDESONIDE AND FORMOTEROL FUMARATE DIHYDRATE 2 PUFF(S): 160; 4.5 AEROSOL RESPIRATORY (INHALATION) at 17:14

## 2022-05-12 RX ADMIN — Medication 1 PATCH: at 12:20

## 2022-05-12 RX ADMIN — ENOXAPARIN SODIUM 40 MILLIGRAM(S): 100 INJECTION SUBCUTANEOUS at 12:20

## 2022-05-12 RX ADMIN — Medication 3: at 12:19

## 2022-05-12 NOTE — PROGRESS NOTE ADULT - SUBJECTIVE AND OBJECTIVE BOX
CHIEF COMPLAINT: SOB    Interval Events: No acute events overnight. Reports difficulty in wearing BiPAP consistently overnight. No fever, chills, HA, N/V/D, abd pain or wheezing. Feels overall better, SOB and cough slowly improving.     REVIEW OF SYSTEMS:  Constitutional: [ ] negative [-] fevers [- ] chills [+ ] weight loss [ ] weight gain  HEENT: [ ] negative [- ] dry eyes [- ] eye irritation [+ ] postnasal drip [- ] nasal congestion  CV: [ ] negative  [- ] chest pain [- ] orthopnea [- ] palpitations [ ] murmur  Resp: [ ] negative [+ ] cough [+ ] shortness of breath [ ] dyspnea [-] wheezing [+ ] sputum [ ] hemoptysis  GI: [ ] negative [- ] nausea [- ] vomiting [ -] diarrhea [ ] constipation [- ] abd pain [ ] dysphagia   : [ ] negative [- ] dysuria [- ] nocturia [- ] hematuria [ ] increased urinary frequency  Musculoskeletal: [ ] negative [- ] back pain [- ] myalgias [ ] arthralgias [ ] fracture  Skin: [ ] negative [- ] rash [ ] itch  Neurological: [ ] negative [- ] headache [- ] dizziness [- ] syncope [ ] weakness [ ] numbness  Psychiatric: [ ] negative [- ] anxiety [ ] depression  Endocrine: [ ] negative [- ] diabetes [ ] thyroid problem  Hematologic/Lymphatic: [ ] negative [ -] anemia [- ] bleeding problem  Allergic/Immunologic: [ ] negative [ ] itchy eyes [ -] nasal discharge [ ] hives [ ] angioedema  [x ] All other systems negative  [ ] Unable to assess ROS because ________    OBJECTIVE:  ICU Vital Signs Last 24 Hrs  T(C): 36.3 (12 May 2022 05:48), Max: 36.7 (11 May 2022 10:39)  T(F): 97.4 (12 May 2022 05:48), Max: 98 (11 May 2022 10:39)  HR: 80 (12 May 2022 08:32) (72 - 110)  BP: 129/79 (12 May 2022 05:48) (129/79 - 147/76)  BP(mean): --  ABP: --  ABP(mean): --  RR: 19 (12 May 2022 08:02) (19 - 22)  SpO2: 92% (12 May 2022 08:32) (90% - 95%)        05-11 @ 07:01  -  05-12 @ 07:00  --------------------------------------------------------  IN: 480 mL / OUT: 1400 mL / NET: -920 mL    05-12 @ 07:01  -  05-12 @ 09:36  --------------------------------------------------------  IN: 0 mL / OUT: 400 mL / NET: -400 mL      CAPILLARY BLOOD GLUCOSE      POCT Blood Glucose.: 261 mg/dL (12 May 2022 09:10)      PHYSICAL EXAM:  General: NAD, speaking in full sentences, no accessory muscle use   HEENT: PERRLA  Lymph Nodes: No palpable cervical LNs  Neck: Supple, no JVD  Respiratory: Minimal wheezing in b/l lung fields, no crackles   Cardiovascular: RRR, S1+S2+0  Abdomen: Soft, NT, ND, BS+   Extremities: No edema, pulses + b/l LEs  Skin: No rash, chronic venous changes in b/l LEs  Neurological: AAOx3, grossly non focal   Psychiatry: Normal mood     HOSPITAL MEDICATIONS:  aspirin  chewable 81 milliGRAM(s) Oral daily  enoxaparin Injectable 40 milliGRAM(s) SubCutaneous every 24 hours    azithromycin  IVPB      azithromycin  IVPB 500 milliGRAM(s) IV Intermittent every 24 hours      dextrose 50% Injectable 25 Gram(s) IV Push once  dextrose Oral Gel 15 Gram(s) Oral once PRN  glucagon  Injectable 1 milliGRAM(s) IntraMuscular once  insulin lispro (ADMELOG) corrective regimen sliding scale   SubCutaneous three times a day before meals  methylPREDNISolone sodium succinate Injectable 20 milliGRAM(s) IV Push every 8 hours    albuterol/ipratropium for Nebulization 3 milliLiter(s) Nebulizer every 6 hours  budesonide 160 MICROgram(s)/formoterol 4.5 MICROgram(s) Inhaler 2 Puff(s) Inhalation two times a day  guaiFENesin Oral Liquid (Sugar-Free) 200 milliGRAM(s) Oral every 6 hours PRN  sodium chloride 3%  Inhalation 4 milliLiter(s) Inhalation every 6 hours    acetaminophen     Tablet .. 650 milliGRAM(s) Oral every 6 hours PRN  melatonin 3 milliGRAM(s) Oral at bedtime PRN  ondansetron Injectable 4 milliGRAM(s) IV Push every 8 hours PRN    aluminum hydroxide/magnesium hydroxide/simethicone Suspension 30 milliLiter(s) Oral every 4 hours PRN        dextrose 5%. 1000 milliLiter(s) IV Continuous <Continuous>        nicotine - 21 mG/24Hr(s) Patch 1 patch Transdermal daily      LABS:                        19.3   7.33  )-----------( 139      ( 12 May 2022 07:43 )             62.7     Hgb Trend: 19.3<--, 18.1<--, 18.4<--, 19.3<--  05-12    135  |  90<L>  |  26<H>  ----------------------------<  283<H>  5.2   |  43<H>  |  0.63    Ca    9.2      12 May 2022 07:43  Phos  3.3     05-12  Mg     2.3     05-12    TPro  7.5  /  Alb  3.2<L>  /  TBili  0.7  /  DBili  x   /  AST  16  /  ALT  41  /  AlkPhos  56  05-12    Creatinine Trend: 0.63<--, 0.45<--, 0.58<--, 0.68<--      Arterial Blood Gas:  05-11 @ 05:41  7.35/86/119/48/97.1/17.3  ABG lactate: --  Arterial Blood Gas:  05-10 @ 15:31  7.30/87/78/43/95.3/12.4  ABG lactate: --        MICROBIOLOGY:   No new results     RADIOLOGY:  [x ] Reviewed and interpreted by me    PULMONARY FUNCTION TESTS:  None     EKG:

## 2022-05-12 NOTE — PROGRESS NOTE ADULT - SUBJECTIVE AND OBJECTIVE BOX
lying in bed    Vital Signs Last 24 Hrs  T(C): 36.3 (12 May 2022 05:48), Max: 36.7 (11 May 2022 10:39)  T(F): 97.4 (12 May 2022 05:48), Max: 98 (11 May 2022 10:39)  HR: 80 (12 May 2022 08:32) (72 - 110)  BP: 129/79 (12 May 2022 05:48) (129/79 - 147/76)  BP(mean): --  RR: 19 (12 May 2022 08:02) (19 - 22)  SpO2: 92% (12 May 2022 08:32) (90% - 95%)    PHYSICAL EXAM:    general - AAO x 3  HEENT - No Icterus  CVS - RRR  RS - AE B/L  Abd - soft, NT  Ext - Pulses +        LABS:                        19.3   7.33  )-----------( 139      ( 12 May 2022 07:43 )             62.7     05-12    135  |  90<L>  |  26<H>  ----------------------------<  283<H>  5.2   |  43<H>  |  0.63    Ca    9.2      12 May 2022 07:43  Phos  3.3     05-12  Mg     2.3     05-12    TPro  7.5  /  Alb  3.2<L>  /  TBili  0.7  /  DBili  x   /  AST  16  /  ALT  41  /  AlkPhos  56  05-12          Culture - Blood (collected 09 May 2022 12:19)  Source: .Blood Blood-Peripheral  Preliminary Report (10 May 2022 13:02):    No growth to date.    Culture - Blood (collected 09 May 2022 12:19)  Source: .Blood Blood-Peripheral  Preliminary Report (10 May 2022 13:02):    No growth to date.

## 2022-05-12 NOTE — PROGRESS NOTE ADULT - PROBLEM SELECTOR PLAN 6
secondary to smoking history possible copd   will continue to monitor   dvt prophylaxis on board   Hematology consult appreciated

## 2022-05-12 NOTE — PROGRESS NOTE ADULT - ASSESSMENT
61-year old M current 45 pack year smoker with a PMH of HTN, type 2 DM, OA, reported COPD on home O2 ( however never had formal diagnosis with PFTs), last hospitalized in 10/2019 and noted to have polycythemia presumed to be 2/2 to chronic hypoxia, now p/w SOB found to have acute on chronic resp failure with hypoxia and hypercapnia in the setting of a parainfluenza infection with bronchiolitis.     1. Acute on chronic resp failure with hypoxia and hypercapnia/ Parainfluenza bronchiolitis:  -  Clinically improving, but still requiring 4 L NC  - Cont Solumedrol 20 mg IV Q8H today with plan to transition to Prednisone 40 mg daily tomorrow. Will likely require a taper over 10-14 days   -  Awaiting morning ABG, but serum bicarb trending down.   - Cont nocturnal BiPAP 10/7/30 % and prn during the day  - Cont nebulized hypertonic saline Q6H preceded by Duonebs Q6H to assist with mobilization of secretions   - Pt unable to tolerate aerobika  - Can transition to Symbicort BID and Spiriva daily once no longer requiring standing nebs.   - Suggest Tessalon perle Q8H prn for cough   - Add Flonase BID for post nasal drip  - Can complete a 5 day course of Azithromycin for possible superimposed bacterial bronchitis.   - Polycythemia may be secondary to chronic hypoxia as pt reports he does not use O2 during the day. Will require an outpt TTE with bubble study to r/o PFO along with outpt heme FUP for work up if polycythemia worsens/ fails to improve despite correction of chronic hypoxia   - No clinical indication to warrant therapeutic phlebotomy at present. Infact this will likely worsen his resp symptoms and hypoxia   - Incentive spirometry   - Cont  Nicotine patch   - Keep O2 sat > 88 %. Requires home O2 upon d-c.   - Pt does not have a portable O2 tank, please ensure that this is set up prior to d-c  - He will require outpatient pulmonary follow with PFTs, a sleep study and repeat CT chest.   - Please call [phone] to set up an appointment for him to see Dr. Candice Barboza at the [facility] Pulmonary office located at [address] [place] [address].   - Plan discussed with Dr. Uriostegui, Dr. De Jesus and the patient.

## 2022-05-12 NOTE — PROGRESS NOTE ADULT - SUBJECTIVE AND OBJECTIVE BOX
HPI:  This is a 60 yo M with Hx of HTN, DM, OA, COPD (not on home o2), chronic smoker, polycythemia presenting with sob worsening for several days, found to be hypoxic 60% on RA. Parainfluenxa +. denies f/c, h/a, sputum/cough, sick contacts, cp palpitations, orthopnea, pnd, le edema. CTA chest (-) PE or consolidation. labs significant for Hgb 19.3 BNP 2089 abg ph 7.32 co2 66  placed on bipap 40% FIO2    (09 May 2022 06:56)    Patient is a 61y old  Male who presents with a chief complaint of shortness of breath (11 May 2022 18:02)      INTERVAL HPI/OVERNIGHT EVENTS: no acute events  feels better     MEDICATIONS  (STANDING):  albuterol/ipratropium for Nebulization 3 milliLiter(s) Nebulizer every 6 hours  aspirin  chewable 81 milliGRAM(s) Oral daily  azithromycin   Tablet 500 milliGRAM(s) Oral daily  budesonide 160 MICROgram(s)/formoterol 4.5 MICROgram(s) Inhaler 2 Puff(s) Inhalation two times a day  dextrose 5%. 1000 milliLiter(s) (50 mL/Hr) IV Continuous <Continuous>  dextrose 50% Injectable 25 Gram(s) IV Push once  enoxaparin Injectable 40 milliGRAM(s) SubCutaneous every 24 hours  glucagon  Injectable 1 milliGRAM(s) IntraMuscular once  insulin lispro (ADMELOG) corrective regimen sliding scale   SubCutaneous three times a day before meals  methylPREDNISolone sodium succinate Injectable 20 milliGRAM(s) IV Push every 8 hours  nicotine - 21 mG/24Hr(s) Patch 1 patch Transdermal daily  sodium chloride 3%  Inhalation 4 milliLiter(s) Inhalation every 6 hours    MEDICATIONS  (PRN):  acetaminophen     Tablet .. 650 milliGRAM(s) Oral every 6 hours PRN Temp greater or equal to 38C (100.4F), Mild Pain (1 - 3)  aluminum hydroxide/magnesium hydroxide/simethicone Suspension 30 milliLiter(s) Oral every 4 hours PRN Dyspepsia  dextrose Oral Gel 15 Gram(s) Oral once PRN Blood Glucose LESS THAN 70 milliGRAM(s)/deciliter  guaiFENesin Oral Liquid (Sugar-Free) 200 milliGRAM(s) Oral every 6 hours PRN Cough  melatonin 3 milliGRAM(s) Oral at bedtime PRN Insomnia  ondansetron Injectable 4 milliGRAM(s) IV Push every 8 hours PRN Nausea and/or Vomiting      Allergies    No Known Allergies    Intolerances        REVIEW OF SYSTEMS:  CONSTITUTIONAL: No fever, weight loss, or fatigue  EYES: No eye pain, visual disturbances, or discharge  ENMT:  No difficulty hearing, tinnitus, vertigo; No sinus or throat pain  NECK: No pain or stiffness    RESPIRATORY:  cough, o shortness of breath  CARDIOVASCULAR: No chest pain, palpitations, dizziness, or leg swelling  GASTROINTESTINAL: No abdominal or epigastric pain. No nausea, vomiting, or hematemesis; No diarrhea or constipation. No melena or hematochezia.  GENITOURINARY: No dysuria, frequency, hematuria, or incontinence  NEUROLOGICAL: No headaches, memory loss, loss of strength, numbness, or tremors  SKIN: No itching, burning, rashes, or lesions   LYMPH NODES: No enlarged glands  ENDOCRINE: No heat or cold intolerance; No hair loss  MUSCULOSKELETAL: No joint pain or swelling; No muscle, back, or extremity pain  PSYCHIATRIC: No depression, anxiety, mood swings, or difficulty sleeping  HEME/LYMPH: No easy bruising, or bleeding gums  ALLERGY AND IMMUNOLOGIC: No hives or eczema    Vital Signs Last 24 Hrs  T(C): 36.6 (12 May 2022 16:18), Max: 36.6 (12 May 2022 16:18)  T(F): 97.8 (12 May 2022 16:18), Max: 97.8 (12 May 2022 16:18)  HR: 95 (12 May 2022 17:15) (72 - 110)  BP: 152/85 (12 May 2022 16:18) (129/79 - 152/85)  RR: 18 (12 May 2022 16:18) (18 - 22)  SpO2: 92% (12 May 2022 17:15) (90% - 95%)    PHYSICAL EXAM:  GENERAL: NAD, well-groomed, well-developed  HEAD:  Atraumatic, Normocephalic  EYES: EOMI, PERRLA, conjunctiva and sclera clear  ENMT: No tonsillar erythema, exudates, or enlargement; Moist mucous membranes, Good dentition, No lesions  NECK: Supple, No JVD, Normal thyroid  NERVOUS SYSTEM:  Alert & Oriented X3, Good concentration; Motor Strength 5/5 B/L upper and lower extremities; DTRs 2+ intact and symmetric  CHEST/LUNG: , wheezing,   HEART: Regular rate and rhythm; No murmurs, rubs, or gallops  ABDOMEN: Soft, Nontender, Nondistended; Bowel sounds present  EXTREMITIES:  2+ Peripheral Pulses, No clubbing, cyanosis, or edema  LYMPH: No lymphadenopathy noted  SKIN: No rashes or lesions    LABS:                        19.3   7.33  )-----------( 139      ( 12 May 2022 07:43 )             62.7     05-12    135  |  90<L>  |  26<H>  ----------------------------<  283<H>  5.2   |  43<H>  |  0.63    Ca    9.2      12 May 2022 07:43  Phos  3.3     05-12  Mg     2.3     05-12    TPro  7.5  /  Alb  3.2<L>  /  TBili  0.7  /  DBili  x   /  AST  16  /  ALT  41  /  AlkPhos  56  05-12    ABG - ( 12 May 2022 10:29 )  pH, Arterial: 7.38  pH, Blood: x     /  pCO2: 83    /  pO2: 67    / HCO3: 49    / Base Excess: 19.2  /  SaO2: 92.9                CAPILLARY BLOOD GLUCOSE      POCT Blood Glucose.: 256 mg/dL (12 May 2022 17:05)  POCT Blood Glucose.: 264 mg/dL (12 May 2022 12:17)  POCT Blood Glucose.: 261 mg/dL (12 May 2022 09:10)  POCT Blood Glucose.: 252 mg/dL (11 May 2022 22:10)      RADIOLOGY & ADDITIONAL TESTS:    Imaging Personally Reviewed:  [ ] YES  [ ] NO    Consultant(s) Notes Reviewed:  [ ] YES  [ ] NO    Care Discussed with Consultants/Other Providers [ ] YES  [ ] NO

## 2022-05-13 LAB
ANION GAP SERPL CALC-SCNC: 8 MMOL/L — SIGNIFICANT CHANGE UP (ref 5–17)
BASE EXCESS BLDA CALC-SCNC: 15.1 MMOL/L — HIGH (ref -2–3)
BLOOD GAS COMMENTS ARTERIAL: SIGNIFICANT CHANGE UP
BUN SERPL-MCNC: 27 MG/DL — HIGH (ref 7–23)
CALCIUM SERPL-MCNC: 9.1 MG/DL — SIGNIFICANT CHANGE UP (ref 8.5–10.1)
CHLORIDE SERPL-SCNC: 89 MMOL/L — LOW (ref 96–108)
CO2 BLDA-SCNC: 47 MMOL/L — HIGH (ref 19–24)
CO2 SERPL-SCNC: 39 MMOL/L — HIGH (ref 22–31)
CREAT SERPL-MCNC: 0.56 MG/DL — SIGNIFICANT CHANGE UP (ref 0.5–1.3)
EGFR: 112 ML/MIN/1.73M2 — SIGNIFICANT CHANGE UP
GAS PNL BLDA: SIGNIFICANT CHANGE UP
GLUCOSE BLDC GLUCOMTR-MCNC: 247 MG/DL — HIGH (ref 70–99)
GLUCOSE BLDC GLUCOMTR-MCNC: 293 MG/DL — HIGH (ref 70–99)
GLUCOSE BLDC GLUCOMTR-MCNC: 333 MG/DL — HIGH (ref 70–99)
GLUCOSE BLDC GLUCOMTR-MCNC: 348 MG/DL — HIGH (ref 70–99)
GLUCOSE SERPL-MCNC: 286 MG/DL — HIGH (ref 70–99)
HCO3 BLDA-SCNC: 45 MMOL/L — HIGH (ref 21–28)
HCT VFR BLD CALC: 62.6 % — CRITICAL HIGH (ref 39–50)
HGB BLD-MCNC: 19.4 G/DL — CRITICAL HIGH (ref 13–17)
HOROWITZ INDEX BLDA+IHG-RTO: 40 — SIGNIFICANT CHANGE UP
MAGNESIUM SERPL-MCNC: 2.3 MG/DL — SIGNIFICANT CHANGE UP (ref 1.6–2.6)
MCHC RBC-ENTMCNC: 31 G/DL — LOW (ref 32–36)
MCHC RBC-ENTMCNC: 31.2 PG — SIGNIFICANT CHANGE UP (ref 27–34)
MCV RBC AUTO: 100.6 FL — HIGH (ref 80–100)
NRBC # BLD: 0 /100 WBCS — SIGNIFICANT CHANGE UP (ref 0–0)
PCO2 BLDA: 69 MMHG — HIGH (ref 32–46)
PH BLDA: 7.42 — SIGNIFICANT CHANGE UP (ref 7.35–7.45)
PHOSPHATE SERPL-MCNC: 3.9 MG/DL — SIGNIFICANT CHANGE UP (ref 2.5–4.5)
PLATELET # BLD AUTO: 145 K/UL — LOW (ref 150–400)
PO2 BLDA: 109 MMHG — HIGH (ref 83–108)
POTASSIUM SERPL-MCNC: 5.2 MMOL/L — SIGNIFICANT CHANGE UP (ref 3.5–5.3)
POTASSIUM SERPL-SCNC: 5.2 MMOL/L — SIGNIFICANT CHANGE UP (ref 3.5–5.3)
RBC # BLD: 6.22 M/UL — HIGH (ref 4.2–5.8)
RBC # FLD: 12.7 % — SIGNIFICANT CHANGE UP (ref 10.3–14.5)
SAO2 % BLDA: 97 % — SIGNIFICANT CHANGE UP (ref 94–98)
SODIUM SERPL-SCNC: 136 MMOL/L — SIGNIFICANT CHANGE UP (ref 135–145)
WBC # BLD: 7.21 K/UL — SIGNIFICANT CHANGE UP (ref 3.8–10.5)
WBC # FLD AUTO: 7.21 K/UL — SIGNIFICANT CHANGE UP (ref 3.8–10.5)

## 2022-05-13 PROCEDURE — 99233 SBSQ HOSP IP/OBS HIGH 50: CPT

## 2022-05-13 RX ORDER — ASPIRIN/CALCIUM CARB/MAGNESIUM 324 MG
1 TABLET ORAL
Qty: 30 | Refills: 0
Start: 2022-05-13 | End: 2022-06-11

## 2022-05-13 RX ORDER — INSULIN LISPRO 100/ML
8 VIAL (ML) SUBCUTANEOUS ONCE
Refills: 0 | Status: COMPLETED | OUTPATIENT
Start: 2022-05-13 | End: 2022-05-13

## 2022-05-13 RX ORDER — FLUTICASONE PROPIONATE 50 MCG
1 SPRAY, SUSPENSION NASAL
Qty: 1 | Refills: 0
Start: 2022-05-13

## 2022-05-13 RX ORDER — FLUTICASONE PROPIONATE 50 MCG
1 SPRAY, SUSPENSION NASAL
Refills: 0 | Status: DISCONTINUED | OUTPATIENT
Start: 2022-05-13 | End: 2022-05-16

## 2022-05-13 RX ORDER — BUDESONIDE AND FORMOTEROL FUMARATE DIHYDRATE 160; 4.5 UG/1; UG/1
1 AEROSOL RESPIRATORY (INHALATION)
Qty: 1 | Refills: 0
Start: 2022-05-13

## 2022-05-13 RX ORDER — ASPIRIN/CALCIUM CARB/MAGNESIUM 324 MG
1 TABLET ORAL
Qty: 30 | Refills: 0

## 2022-05-13 RX ORDER — NICOTINE POLACRILEX 2 MG
1 GUM BUCCAL
Qty: 1 | Refills: 0
Start: 2022-05-13

## 2022-05-13 RX ORDER — SODIUM CHLORIDE 9 MG/ML
500 INJECTION, SOLUTION INTRAVENOUS
Refills: 0 | Status: DISCONTINUED | OUTPATIENT
Start: 2022-05-13 | End: 2022-05-16

## 2022-05-13 RX ORDER — INSULIN LISPRO 100/ML
VIAL (ML) SUBCUTANEOUS
Refills: 0 | Status: DISCONTINUED | OUTPATIENT
Start: 2022-05-13 | End: 2022-05-16

## 2022-05-13 RX ADMIN — Medication 81 MILLIGRAM(S): at 11:39

## 2022-05-13 RX ADMIN — Medication 2: at 09:07

## 2022-05-13 RX ADMIN — Medication 1 PATCH: at 20:03

## 2022-05-13 RX ADMIN — Medication 1 PATCH: at 11:30

## 2022-05-13 RX ADMIN — Medication 1 PATCH: at 07:15

## 2022-05-13 RX ADMIN — Medication 20 MILLIGRAM(S): at 06:22

## 2022-05-13 RX ADMIN — Medication 1 SPRAY(S): at 17:41

## 2022-05-13 RX ADMIN — Medication 3 MILLILITER(S): at 05:09

## 2022-05-13 RX ADMIN — BUDESONIDE AND FORMOTEROL FUMARATE DIHYDRATE 2 PUFF(S): 160; 4.5 AEROSOL RESPIRATORY (INHALATION) at 17:22

## 2022-05-13 RX ADMIN — Medication 40 MILLIGRAM(S): at 17:26

## 2022-05-13 RX ADMIN — SODIUM CHLORIDE 50 MILLILITER(S): 9 INJECTION, SOLUTION INTRAVENOUS at 18:10

## 2022-05-13 RX ADMIN — Medication 6: at 17:19

## 2022-05-13 RX ADMIN — Medication 8 UNIT(S): at 22:13

## 2022-05-13 RX ADMIN — BUDESONIDE AND FORMOTEROL FUMARATE DIHYDRATE 2 PUFF(S): 160; 4.5 AEROSOL RESPIRATORY (INHALATION) at 06:23

## 2022-05-13 RX ADMIN — Medication 3 MILLILITER(S): at 11:01

## 2022-05-13 RX ADMIN — Medication 1 PATCH: at 11:36

## 2022-05-13 RX ADMIN — Medication 3 MILLILITER(S): at 17:06

## 2022-05-13 RX ADMIN — Medication 20 MILLIGRAM(S): at 13:37

## 2022-05-13 RX ADMIN — AZITHROMYCIN 500 MILLIGRAM(S): 500 TABLET, FILM COATED ORAL at 11:39

## 2022-05-13 RX ADMIN — Medication 4: at 11:33

## 2022-05-13 RX ADMIN — ENOXAPARIN SODIUM 40 MILLIGRAM(S): 100 INJECTION SUBCUTANEOUS at 11:38

## 2022-05-13 NOTE — DISCHARGE NOTE PROVIDER - ATTENDING DISCHARGE PHYSICAL EXAMINATION:
GENERAL: NAD  HEAD:  Atraumatic  EYES: PERRLA  NERVOUS SYSTEM:  Awake, alert  CHEST/LUNG: Clear, on NC  HEART: RRR, S1, S2  ABDOMEN: Soft, non tender  EXTREMITIES:  no edema

## 2022-05-13 NOTE — PROGRESS NOTE ADULT - ASSESSMENT
1. Acute on chronic resp failure with hypoxia and hypercapnia/ Parainfluenza bronchiolitis:  -  Continues to slowly improve  - Supplemental O@ titrated down to 3 L NC, goal to keep saturation > 90 %  - Please transition to Prednisone 40 mg daily today and taper over 10 days   - Morning ABG showing PCO2 down to 69 and serum bicarb is trending down. Mental status remains stable.  - Remains on nocturnal BiPAP 10/7/30 % and prn during the day. However, pt reports that he si unable to tolerate BiPAP support for > 2-3 hours. Danger of progressive hypercapnia including death was explained to patient.   - Cont nebulized hypertonic saline Q6H preceded by Duonebs Q6H to assist with mobilization of secretions   - Pt unable to tolerate aerobika  - Can transition to Symbicort BID and Spiriva daily once no longer requiring standing nebs.   - Start Tessalon perle Q8H prn for cough   - Add Flonase BID for post nasal drip  - Can complete a 5 day course of Azithromycin for possible superimposed bacterial bronchitis.   - TTE w/ bubble study was negative for a shunt. Polycythemia likely secondary to chronic hypoxia as pt reports he does not use O2 during the day. Requires outpt heme FUP for work up if polycythemia worsens/ fails to improve despite correction of chronic hypoxia   - No clinical indication to warrant therapeutic phlebotomy at present. Infact this will likely worsen his resp symptoms and hypoxia   - Incentive spirometry   - Cont  Nicotine patch   - Requires home O2 upon d-c.   - He will require outpatient pulmonary follow with PFTs, a sleep study and repeat CT chest.   - Please call [phone] to set up an appointment for him to see Dr. Candice Barboza at the [facility] Pulmonary office located at [address] [place] [address].   - Plan discussed with Dr. Uriostegui and the patient

## 2022-05-13 NOTE — PROGRESS NOTE ADULT - SUBJECTIVE AND OBJECTIVE BOX
CHIEF COMPLAINT: SOB     Interval Events: No acute events overnight. Used BiPAP for < 3 hours. Reports ongoing improvement in SOB and cough. No fever, chills, CP, digit pain, dizziness, headache, rash/ itching, abd pain, N/V/D, dysuria, hematuria.     REVIEW OF SYSTEMS:  Constitutional: [ ] negative [-] fevers [- ] chills [+ ] weight loss [ ] weight gain  HEENT: [ ] negative [- ] dry eyes [- ] eye irritation [+ ] postnasal drip [- ] nasal congestion  CV: [ ] negative  [- ] chest pain [- ] orthopnea [- ] palpitations [ ] murmur  Resp: [ ] negative [+ ] cough [+ ] shortness of breath [ ] dyspnea [-] wheezing [+ ] sputum [ ] hemoptysis  GI: [ ] negative [- ] nausea [- ] vomiting [ -] diarrhea [ ] constipation [- ] abd pain [ ] dysphagia   : [ ] negative [- ] dysuria [- ] nocturia [- ] hematuria [ ] increased urinary frequency  Musculoskeletal: [ ] negative [- ] back pain [- ] myalgias [ ] arthralgias [ ] fracture  Skin: [ ] negative [- ] rash [ ] itch  Neurological: [ ] negative [- ] headache [- ] dizziness [- ] syncope [ ] weakness [ ] numbness  Psychiatric: [ ] negative [- ] anxiety [ ] depression  Endocrine: [ ] negative [- ] diabetes [ ] thyroid problem  Hematologic/Lymphatic: [ ] negative [ -] anemia [- ] bleeding problem  Allergic/Immunologic: [ ] negative [ ] itchy eyes [ -] nasal discharge [ ] hives [ ] angioedema  [x ] All other systems negative  [ ] Unable to assess ROS because ________    OBJECTIVE:  ICU Vital Signs Last 24 Hrs  T(C): 36.5 (13 May 2022 10:45), Max: 36.6 (12 May 2022 16:18)  T(F): 97.7 (13 May 2022 10:45), Max: 97.8 (12 May 2022 16:18)  HR: 85 (13 May 2022 11:01) (2 - 102)  BP: 130/78 (13 May 2022 10:45) (126/65 - 152/85)  BP(mean): --  ABP: --  ABP(mean): --  RR: 20 (13 May 2022 10:45) (18 - 20)  SpO2: 96% (13 May 2022 11:01) (90% - 98%)        05-12 @ 07:01  -  05-13 @ 07:00  --------------------------------------------------------  IN: 240 mL / OUT: 850 mL / NET: -610 mL      CAPILLARY BLOOD GLUCOSE      POCT Blood Glucose.: 348 mg/dL (13 May 2022 11:26)      PHYSICAL EXAM:  General: NAD, speaking in full sentences, no accessory muscle use   HEENT: PERRLA  Lymph Nodes: No palpable cervical LNs  Neck: Supple, no JVD  Respiratory: CTA b/l, decreased Bs b/l bases, no wheezing or crackles   Cardiovascular: RRR, S1+S2+0  Abdomen: Soft, NT, ND, BS+   Extremities: No edema, pulses + b/l LEs  Skin: No rash, chronic venous changes in b/l LEs  Neurological: AAOx3, grossly non focal   Psychiatry: Normal mood     HOSPITAL MEDICATIONS:  aspirin  chewable 81 milliGRAM(s) Oral daily  enoxaparin Injectable 40 milliGRAM(s) SubCutaneous every 24 hours    azithromycin   Tablet 500 milliGRAM(s) Oral daily      dextrose 50% Injectable 25 Gram(s) IV Push once  dextrose Oral Gel 15 Gram(s) Oral once PRN  glucagon  Injectable 1 milliGRAM(s) IntraMuscular once  insulin lispro (ADMELOG) corrective regimen sliding scale   SubCutaneous three times a day before meals  methylPREDNISolone sodium succinate Injectable 20 milliGRAM(s) IV Push every 8 hours    albuterol/ipratropium for Nebulization 3 milliLiter(s) Nebulizer every 6 hours  budesonide 160 MICROgram(s)/formoterol 4.5 MICROgram(s) Inhaler 2 Puff(s) Inhalation two times a day  guaiFENesin Oral Liquid (Sugar-Free) 200 milliGRAM(s) Oral every 6 hours PRN  sodium chloride 3%  Inhalation 4 milliLiter(s) Inhalation every 6 hours    acetaminophen     Tablet .. 650 milliGRAM(s) Oral every 6 hours PRN  melatonin 3 milliGRAM(s) Oral at bedtime PRN  ondansetron Injectable 4 milliGRAM(s) IV Push every 8 hours PRN    aluminum hydroxide/magnesium hydroxide/simethicone Suspension 30 milliLiter(s) Oral every 4 hours PRN        dextrose 5%. 1000 milliLiter(s) IV Continuous <Continuous>        nicotine - 21 mG/24Hr(s) Patch 1 patch Transdermal daily      LABS:                        19.4   7.21  )-----------( 145      ( 13 May 2022 07:33 )             62.6     Hgb Trend: 19.4<--, 19.3<--, 18.1<--, 18.4<--, 19.3<--  05-13    136  |  89<L>  |  27<H>  ----------------------------<  286<H>  5.2   |  39<H>  |  0.56    Ca    9.1      13 May 2022 07:33  Phos  3.9     05-13  Mg     2.3     05-13    TPro  7.5  /  Alb  3.2<L>  /  TBili  0.7  /  DBili  x   /  AST  16  /  ALT  41  /  AlkPhos  56  05-12    Creatinine Trend: 0.56<--, 0.63<--, 0.45<--, 0.58<--, 0.68<--      Arterial Blood Gas:  05-13 @ 05:20  7.42/69/109/45/97.0/15.1  ABG lactate: --  Arterial Blood Gas:  05-12 @ 10:29  7.38/83/67/49/92.9/19.2  ABG lactate: --        MICROBIOLOGY:   No new results     RADIOLOGY:  [x ] Reviewed and interpreted by me    PULMONARY FUNCTION TESTS:  None     EKG:

## 2022-05-13 NOTE — PROGRESS NOTE ADULT - REASON FOR ADMISSION
Hypoxic/ hypercapnic resp failure
shortness of breath

## 2022-05-13 NOTE — DISCHARGE NOTE PROVIDER - NSDCMRMEDTOKEN_GEN_ALL_CORE_FT
aspirin 81 mg oral tablet, chewable: 1 tab(s) orally once a day  glyburide-metformin 5 mg-500 mg oral tablet: 1 tab(s) orally 2 times a day  Glyxambi 10 mg-5 mg oral tablet: 1 tab(s) orally once a day (in the morning)   aspirin 81 mg oral tablet, chewable: 1 tab(s) orally once a day  benzonatate 100 mg oral capsule: 1 cap(s) orally every 8 hours, As needed, Cough  fluticasone 50 mcg/inh nasal spray: 1 spray(s) nasal 2 times a day  glyburide-metformin 5 mg-500 mg oral tablet: 1 tab(s) orally 2 times a day  Glyxambi 10 mg-5 mg oral tablet: 1 tab(s) orally once a day (in the morning)  nicotine 21 mg/24 hr transdermal film, extended release: 1 patch transdermal once a day   predniSONE 20 mg oral tablet: 1 tab(s) orally once a day   Symbicort 160 mcg-4.5 mcg/inh inhalation aerosol: 1 puff(s) inhaled 2 times a day    albuterol 90 mcg/inh inhalation aerosol: 2 puff(s) inhaled every 6 hours, As Needed -for bronchospasm - for shortness of breath and/or wheezing   aspirin 81 mg oral tablet, chewable: 1 tab(s) orally once a day  benzonatate 100 mg oral capsule: 1 cap(s) orally every 8 hours, As needed, Cough  fluticasone 50 mcg/inh nasal spray: 1 spray(s) nasal 2 times a day  glyburide-metformin 5 mg-500 mg oral tablet: 1 tab(s) orally 2 times a day  Glyxambi 10 mg-5 mg oral tablet: 1 tab(s) orally once a day (in the morning)  nicotine 21 mg/24 hr transdermal film, extended release: 1 patch transdermal once a day   predniSONE 10 mg oral tablet: 3 tabs daily for 3 days, then 2 tabs daily for 3 days, 1 tab daiy for 3 days then stop.  Spiriva HandiHaler 18 mcg inhalation capsule: 1 cap(s) inhaled once a day   Symbicort 160 mcg-4.5 mcg/inh inhalation aerosol: 1 puff(s) inhaled 2 times a day

## 2022-05-13 NOTE — DISCHARGE NOTE PROVIDER - HOSPITAL COURSE
61-year old M current 45 pack year smoker with a PMH of HTN, type 2 DM, OA, reported COPD on home O2 ( however never had formal diagnosis with PFTs), last hospitalized in 10/2019 and noted to have polycythemia presumed to be 2/2 to chronic hypoxia, now p/w SOB found to have acute on chronic resp failure with hypoxia and hypercapnia in the setting of a parainfluenza infection with bronchiolitis. Pt slowly improving. Continue prednisone taper x 10 days. Continue symbicort BID and Spiriva daily. Dc on Flonase for post nasal gtt. Complete Azithro x 5 days for bronchitis. F/u outpt Heme for p. vera x 1 week. F/u with Pulmonary, call [phone] to set up an appointment for him to see Dr. Candice Barboza at the [facility] Pulmonary office located at [address] [place] [address]. Continue home meds.     Patient is in chronic respiratory failure with hypercapnia and hypoxia 2/2 copd, NIV with Guaranteed Augmented Targeted Tidal volumes with ability for MPV will be required to minimize further clinical decline and hospital readmissions. 61-year old M current 45 pack year smoker with a PMH of HTN, type 2 DM, OA, reported COPD on home O2 ( however never had formal diagnosis with PFTs), last hospitalized in 10/2019 and noted to have polycythemia presumed to be 2/2 to chronic hypoxia, now p/w SOB found to have acute on chronic resp failure with hypoxia and hypercapnia in the setting of a parainfluenza infection with bronchiolitis. Pt slowly improving. Continue prednisone taper x 10 days. Continue symbicort BID and Spiriva daily. Dc on Flonase for post nasal gtt. Completed Azithro x 5 days for bronchitis. F/u outpt Heme for p. vera x 1 week. F/u with Pulmonary, call [phone] to set up an appointment for him to see Dr. Candice Barboza at the [facility] Pulmonary office located at [address] [place] [address]. Continue home meds.     Patient is in chronic respiratory failure with hypercapnia and hypoxia 2/2 copd, NIV with Guaranteed Augmented Targeted Tidal volumes with ability for MPV will be required to minimize further clinical decline and hospital readmissions.        Problem/Plan - 1:  ·  Problem: Sepsis with acute hypoxic respiratory failure.   ·  Plan: secondary to acute parainfluenza infection   -supportive care for viral parainfluenza  -s/p azithromycin.     Problem/Plan - 2:  ·  Problem: COPD exacerbation.   ·  Plan: Acute on chronic resp failure with hypoxia and hypercapnia/ Parainfluenza bronchiolitis:  - Continues to improve  - Supplemental O2, has Home O2  - Continue Prednisone taper over 10 days   - Remains on nocturnal BiPAP 10/7/30 % and prn during the day. However, pt reports that he si unable to tolerate BiPAP support for > 2-3 hours. Danger of progressive hypercapnia including death was explained to patient.   - Symbicort BID and Spiriva daily   - Started Tessalon perle Q8H prn for cough   - Flonase BID for post nasal drip  - S/P 5 day course of Azithromycin for possible superimposed bacterial bronchitis.   - TTE w/ bubble study was negative for a shunt. Polycythemia likely secondary to chronic hypoxia as pt reports he does not use O2 during the day. Requires outpt heme FUP for work up if polycythemia worsens/ fails to improve despite correction of chronic hypoxia   - No clinical indication to warrant therapeutic phlebotomy at present. Infact this will likely worsen his resp symptoms and hypoxia   - Incentive spirometry   - Cont  Nicotine patch   - Has home O2  - He will require outpatient pulmonary follow with PFTs, a sleep study and repeat CT chest.     Problem/Plan - 3:  ·  Problem: Parainfluenza virus infection.   ·  Plan: supportive care.     Problem/Plan - 4:  ·  Problem: DM type 2  ·  Plan: A1C 6.9   resume home med     Problem/Plan - 5:  ·  Problem: HTN (hypertension).   ·  Plan: currently off medications continue to monitor.     Problem/Plan - 6:  ·  Problem: Hypoxemic polycythemia.   ·  Plan: secondary to smoking history possible copd   will continue to monitor   dvt prophylaxis on board   Hematology consult appreciated.    Bipap requesting and updated information sent to [facility]. as  per Rep Mathieu batres to send patient home>. Ana Rosa lives alone and a home  assessments needs to be done  Pt will follow up with pulm 61-year old M current 45 pack year smoker with a PMH of HTN, type 2 DM, OA, reported COPD on home O2 ( however never had formal diagnosis with PFTs), last hospitalized in 10/2019 and noted to have polycythemia presumed to be 2/2 to chronic hypoxia, now p/w SOB found to have acute on chronic resp failure with hypoxia and hypercapnia in the setting of a parainfluenza infection with bronchiolitis. Pt slowly improving. Continue prednisone taper x 10 days. Continue symbicort BID and Spiriva daily. Dc on Flonase for post nasal gtt. Completed Azithro x 5 days for bronchitis. F/u outpt Heme for p. vera x 1 week. F/u with Pulmonary, call [phone] to set up an appointment for him to see Dr. Candice Barboza at the [facility] Pulmonary office located at [address] [place] [address]. Continue home meds.     Patient is in chronic respiratory failure with hypercapnia and hypoxia 2/2 copd, NIV with Guaranteed Augmented Targeted Tidal volumes with ability for MPV will be required to minimize further clinical decline and hospital readmissions.        Problem/Plan - 1:  ·  Problem: Sepsis with acute hypoxic respiratory failure.   ·  Plan: secondary to acute parainfluenza infection   -supportive care for viral parainfluenza  -s/p azithromycin.     Problem/Plan - 2:  ·  Problem: suspected COPD exacerbation, however never had PFTs and not officially diagnosed  ·  Plan: Acute on chronic resp failure with hypoxia and hypercapnia/ Parainfluenza bronchiolitis:  - Continues to improve  - Supplemental O2, has Home O2  - Continue Prednisone taper over 10 days   - Pt reports that he is unable to tolerate BiPAP support for > 2-3 hours and not willing to try. Danger of progressive hypercapnia including death was explained to patient.   - Symbicort BID and Spiriva daily   - Tessalon perle Q8H prn for cough   - Flonase BID for post nasal drip  - S/P 5 day course of Azithromycin for possible superimposed bacterial bronchitis.   - TTE w/ bubble study was negative for a shunt. Polycythemia likely secondary to chronic hypoxia as pt reports he does not use O2 during the day. Requires outpt heme FUP for work up if polycythemia worsens/ fails to improve despite correction of chronic hypoxia   - No clinical indication to warrant therapeutic phlebotomy at present. Infact this will likely worsen his resp symptoms and hypoxia   - Incentive spirometry   - Cont  Nicotine patch   - Has home O2  - He will require outpatient pulmonary follow with PFTs, a sleep study and repeat CT chest.  - BIPAP can be set up after PFT done as oupt     Problem/Plan - 3:  ·  Problem: Parainfluenza virus infection.   ·  Plan: supportive care.     Problem/Plan - 4:  ·  Problem: DM type 2  ·  Plan: A1C 6.9   resume home med     Problem/Plan - 5:  ·  Problem: HTN (hypertension).   ·  Plan: currently off medications continue to monitor.     Problem/Plan - 6:  ·  Problem: Hypoxemic polycythemia.   ·  Plan: secondary to smoking history possible copd   will continue to monitor   dvt prophylaxis on board   Hematology consult appreciated.    Bipap requesting and updated information sent to [facility]. as  per Rep Mathieu batres to send patient home>. Ana Rosa lives alone and a home  assessments needs to be done  Pt will follow up with pulm

## 2022-05-13 NOTE — PROGRESS NOTE ADULT - SUBJECTIVE AND OBJECTIVE BOX
HPI:  This is a 60 yo M with Hx of HTN, DM, OA, COPD (not on home o2), chronic smoker, polycythemia presenting with sob worsening for several days, found to be hypoxic 60% on RA. Parainfluenxa +. denies f/c, h/a, sputum/cough, sick contacts, cp palpitations, orthopnea, pnd, le edema. CTA chest (-) PE or consolidation. labs significant for Hgb 19.3 BNP 2089 abg ph 7.32 co2 66  placed on bipap 40% FIO2    (09 May 2022 06:56)    Patient is a 61y old  Male who presents with a chief complaint of shortness of breath (11 May 2022 18:02)      INTERVAL HPI/OVERNIGHT EVENTS: no acute events  feels better     MEDICATIONS  (STANDING):  albuterol/ipratropium for Nebulization 3 milliLiter(s) Nebulizer every 6 hours  aspirin  chewable 81 milliGRAM(s) Oral daily  budesonide 160 MICROgram(s)/formoterol 4.5 MICROgram(s) Inhaler 2 Puff(s) Inhalation two times a day  dextrose 5%. 1000 milliLiter(s) (50 mL/Hr) IV Continuous <Continuous>  dextrose 50% Injectable 25 Gram(s) IV Push once  enoxaparin Injectable 40 milliGRAM(s) SubCutaneous every 24 hours  fluticasone propionate 50 MICROgram(s)/spray Nasal Spray 1 Spray(s) Both Nostrils two times a day  glucagon  Injectable 1 milliGRAM(s) IntraMuscular once  insulin lispro (ADMELOG) corrective regimen sliding scale   SubCutaneous three times a day before meals  lactated ringers. 500 milliLiter(s) (50 mL/Hr) IV Continuous <Continuous>  nicotine - 21 mG/24Hr(s) Patch 1 patch Transdermal daily  predniSONE   Tablet 40 milliGRAM(s) Oral daily  sodium chloride 3%  Inhalation 4 milliLiter(s) Inhalation every 6 hours    MEDICATIONS  (PRN):  acetaminophen     Tablet .. 650 milliGRAM(s) Oral every 6 hours PRN Temp greater or equal to 38C (100.4F), Mild Pain (1 - 3)  aluminum hydroxide/magnesium hydroxide/simethicone Suspension 30 milliLiter(s) Oral every 4 hours PRN Dyspepsia  benzonatate 100 milliGRAM(s) Oral every 8 hours PRN Cough  dextrose Oral Gel 15 Gram(s) Oral once PRN Blood Glucose LESS THAN 70 milliGRAM(s)/deciliter  melatonin 3 milliGRAM(s) Oral at bedtime PRN Insomnia  ondansetron Injectable 4 milliGRAM(s) IV Push every 8 hours PRN Nausea and/or Vomiting        Allergies    No Known Allergies    Intolerances        REVIEW OF SYSTEMS:  CONSTITUTIONAL: No fever, weight loss, or fatigue  EYES: No eye pain, visual disturbances, or discharge  ENMT:  No difficulty hearing, tinnitus, vertigo; No sinus or throat pain  NECK: No pain or stiffness    RESPIRATORY:  cough, o shortness of breath  CARDIOVASCULAR: No chest pain, palpitations, dizziness, or leg swelling  GASTROINTESTINAL: No abdominal or epigastric pain. No nausea, vomiting, or hematemesis; No diarrhea or constipation. No melena or hematochezia.  GENITOURINARY: No dysuria, frequency, hematuria, or incontinence  NEUROLOGICAL: No headaches, memory loss, loss of strength, numbness, or tremors  SKIN: No itching, burning, rashes, or lesions   LYMPH NODES: No enlarged glands  ENDOCRINE: No heat or cold intolerance; No hair loss  MUSCULOSKELETAL: No joint pain or swelling; No muscle, back, or extremity pain  PSYCHIATRIC: No depression, anxiety, mood swings, or difficulty sleeping  HEME/LYMPH: No easy bruising, or bleeding gums  ALLERGY AND IMMUNOLOGIC: No hives or eczema  Vital Signs Last 24 Hrs  T(C): 36.4 (13 May 2022 16:36), Max: 36.5 (13 May 2022 10:45)  T(F): 97.5 (13 May 2022 16:36), Max: 97.7 (13 May 2022 10:45)  HR: 79 (13 May 2022 17:06) (2 - 102)  BP: 131/74 (13 May 2022 16:36) (126/65 - 144/89)  BP(mean): --  RR: 18 (13 May 2022 16:36) (18 - 20)  SpO2: 97% (13 May 2022 17:06) (91% - 98%)    PHYSICAL EXAM:  GENERAL: NAD, well-groomed, well-developed  HEAD:  Atraumatic, Normocephalic  EYES: EOMI, PERRLA, conjunctiva and sclera clear  ENMT: No tonsillar erythema, exudates, or enlargement; Moist mucous membranes, Good dentition, No lesions  NECK: Supple, No JVD, Normal thyroid  NERVOUS SYSTEM:  Alert & Oriented X3, Good concentration; Motor Strength 5/5 B/L upper and lower extremities; DTRs 2+ intact and symmetric  CHEST/LUNG: , wheezing,   HEART: Regular rate and rhythm; No murmurs, rubs, or gallops  ABDOMEN: Soft, Nontender, Nondistended; Bowel sounds present  EXTREMITIES:  2+ Peripheral Pulses, No clubbing, cyanosis, or edema  LYMPH: No lymphadenopathy noted  SKIN: No rashes or lesions                        19.4   7.21  )-----------( 145      ( 13 May 2022 07:33 )             62.6     05-13    136  |  89<L>  |  27<H>  ----------------------------<  286<H>  5.2   |  39<H>  |  0.56    Ca    9.1      13 May 2022 07:33  Phos  3.9     05-13  Mg     2.3     05-13    TPro  7.5  /  Alb  3.2<L>  /  TBili  0.7  /  DBili  x   /  AST  16  /  ALT  41  /  AlkPhos  56  05-12                  CAPILLARY BLOOD GLUCOSE      POCT Blood Glucose.: 256 mg/dL (12 May 2022 17:05)  POCT Blood Glucose.: 264 mg/dL (12 May 2022 12:17)  POCT Blood Glucose.: 261 mg/dL (12 May 2022 09:10)  POCT Blood Glucose.: 252 mg/dL (11 May 2022 22:10)      RADIOLOGY & ADDITIONAL TESTS:    Imaging Personally Reviewed:  [ ] YES  [ ] NO    Consultant(s) Notes Reviewed:  [ ] YES  [ ] NO    Care Discussed with Consultants/Other Providers [ ] YES  [ ] NO

## 2022-05-13 NOTE — DISCHARGE NOTE PROVIDER - NSDCCPCAREPLAN_GEN_ALL_CORE_FT
PRINCIPAL DISCHARGE DIAGNOSIS  Diagnosis: COPD with exacerbation  Assessment and Plan of Treatment: 61-year old M current 45 pack year smoker with a PMH of HTN, type 2 DM, OA, reported COPD on home O2 ( however never had formal diagnosis with PFTs), last hospitalized in 10/2019 and noted to have polycythemia presumed to be 2/2 to chronic hypoxia, now p/w SOB found to have acute on chronic resp failure with hypoxia and hypercapnia in the setting of a parainfluenza infection with bronchiolitis. Pt slowly improving. Continue prednisone taper x 10 days. Continue symbicort BID and Spiriva daily. Dc on Flonase for post nasal gtt. Complete Azithro x 5 days for bronchitis. F/u outpt Heme for p. vera x 1 week. F/u with Pulmonary, call [phone] to set up an appointment for him to see Dr. Candcie Barboza at the [facility] Pulmonary office located at [address] [place] [address]. Continue home meds.         SECONDARY DISCHARGE DIAGNOSES  Diagnosis: Acute respiratory failure with hypoxia  Assessment and Plan of Treatment:     Diagnosis: Polycythemia  Assessment and Plan of Treatment:

## 2022-05-13 NOTE — DISCHARGE NOTE PROVIDER - NSFOLLOWUPCLINICS_GEN_ALL_ED_FT
[facility]  Pulmonology  [address] [place] [address], [place] [address]  Phone: [phone]  Fax:   Follow Up Time: 1 week

## 2022-05-13 NOTE — DISCHARGE NOTE PROVIDER - CARE PROVIDER_API CALL
Sergey De Jesus)  Hematology; Internal Medicine; Medical Oncology  [address] [facility], [address]  [place] [address]  Phone: [phone]  Fax: [phone]  Follow Up Time: 1 week    Candice Barboza)  Internal Medicine; Pulmonary Disease  [address] [place] [address]  Phone: [phone]  Fax: [phone]  Follow Up Time: 1 week   Sergey De Jesus)  Hematology; Internal Medicine; Medical Oncology  [address] [facility], [address]  [place] [address]  Phone: [phone]  Fax: [phone]  Follow Up Time: 1 week    Candice Barboza)  Internal Medicine; Pulmonary Disease  [address] [place] [address]  Phone: [phone]  Fax: [phone]  Follow Up Time: 1 week    Collins Paul)  Medicine  Critical Care  [address] [place] [address]  Phone: [phone]  Fax: [phone]  Follow Up Time: 1 week

## 2022-05-13 NOTE — PROGRESS NOTE ADULT - ASSESSMENT
61-year old M current 45 pack year smoker with a PMH of HTN, type 2 DM, OA, reported COPD on home O2 ( however never had formal diagnosis with PFTs), last hospitalized in 10/2019 and noted to have polycythemia presumed to be 2/2 to chronic hypoxia, now p/w SOB found to have acute on chronic resp failure with hypoxia and hypercapnia in the setting of a parainfluenza infection with bronchiolitis.     1. Acute on chronic resp failure with hypoxia and hypercapnia/ Parainfluenza bronchiolitis:  -  Continues to slowly improve  - Supplemental O@ titrated down to 3 L NC, goal to keep saturation > 90 %  - Please transition to Prednisone 40 mg daily today and taper over 10 days   - Morning ABG showing PCO2 down to 69 and serum bicarb is trending down. Mental status remains stable.  - Remains on nocturnal BiPAP 10/7/30 % and prn during the day. However, pt reports that he si unable to tolerate BiPAP support for > 2-3 hours. Danger of progressive hypercapnia including death was explained to patient.   - Cont nebulized hypertonic saline Q6H preceded by Duonebs Q6H to assist with mobilization of secretions   - Pt unable to tolerate aerobika  - Can transition to Symbicort BID and Spiriva daily once no longer requiring standing nebs.   - Start Tessalon perle Q8H prn for cough   - Add Flonase BID for post nasal drip  - Can complete a 5 day course of Azithromycin for possible superimposed bacterial bronchitis.   - TTE w/ bubble study was negative for a shunt. Polycythemia likely secondary to chronic hypoxia as pt reports he does not use O2 during the day. Requires outpt heme FUP for work up if polycythemia worsens/ fails to improve despite correction of chronic hypoxia   - No clinical indication to warrant therapeutic phlebotomy at present. Infact this will likely worsen his resp symptoms and hypoxia   - Incentive spirometry   - Cont  Nicotine patch   - Requires home O2 upon d-c.   - He will require outpatient pulmonary follow with PFTs, a sleep study and repeat CT chest.   - Please call [phone] to set up an appointment for him to see Dr. Candice Barboza at the [facility] Pulmonary office located at [address] [place] [address].   - Plan discussed with Dr. Uriostegui and the patient.    61-year old M current 45 pack year smoker with a PMH of HTN, type 2 DM, OA, reported COPD on home O2 ( however never had formal diagnosis with PFTs), last hospitalized in 10/2019 and noted to have polycythemia presumed to be 2/2 to chronic hypoxia, now p/w SOB found to have acute on chronic resp failure with hypoxia and hypercapnia in the setting of a parainfluenza infection with bronchiolitis.     1. Acute on chronic resp failure with hypoxia and hypercapnia/ Parainfluenza bronchiolitis:  -  Continues to slowly improve  - Supplemental O@ titrated down to 3 L NC, goal to keep saturation > 90 %  - Please transition to Prednisone 40 mg daily today and taper over 10 days   - Morning ABG showing PCO2 down to 69 and serum bicarb is trending down. Mental status remains stable.  - Remains on nocturnal BiPAP 10/7/30 % and prn during the day. However, pt reports that he si unable to tolerate BiPAP support for > 2-3 hours. Danger of progressive hypercapnia including death was explained to patient.   - Cont nebulized hypertonic saline Q6H preceded by Duonebs Q6H to assist with mobilization of secretions   - Pt unable to tolerate aerobika  - Can transition to Symbicort BID and Spiriva daily once no longer requiring standing nebs.   - Start Tessalon perle Q8H prn for cough   - Add Flonase BID for post nasal drip  - Can complete a 5 day course of Azithromycin for possible superimposed bacterial bronchitis.   - TTE w/ bubble study was negative for a shunt. Polycythemia likely secondary to chronic hypoxia as pt reports he does not use O2 during the day. Requires outpt heme FUP for work up if polycythemia worsens/ fails to improve despite correction of chronic hypoxia   - No clinical indication to warrant therapeutic phlebotomy at present. Infact this will likely worsen his resp symptoms and hypoxia   - Incentive spirometry   - Cont  Nicotine patch   - Requires home O2 upon d-c.   - He will require outpatient pulmonary follow with PFTs, a sleep study and repeat CT chest.   - Please call [phone] to set up an appointment for him to see Dr. Candice Barboza at the [facility] Pulmonary office located at [address] [place] [address].   - Plan discussed with Dr. Uriostegui and the patient

## 2022-05-13 NOTE — DISCHARGE NOTE PROVIDER - PROVIDER TOKENS
PROVIDER:[TOKEN:[[id number]],FOLLOWUP:[1 week]],PROVIDER:[TOKEN:[[id number]],FOLLOWUP:[1 week]] PROVIDER:[TOKEN:[[id number]],FOLLOWUP:[1 week]],PROVIDER:[TOKEN:[[id number]],FOLLOWUP:[1 week]],PROVIDER:[TOKEN:[[id number]],FOLLOWUP:[1 week]]

## 2022-05-14 LAB
A1C WITH ESTIMATED AVERAGE GLUCOSE RESULT: 7.2 % — HIGH (ref 4–5.6)
ANION GAP SERPL CALC-SCNC: 15 MMOL/L — SIGNIFICANT CHANGE UP (ref 5–17)
BUN SERPL-MCNC: 25 MG/DL — HIGH (ref 7–23)
CALCIUM SERPL-MCNC: 8.7 MG/DL — SIGNIFICANT CHANGE UP (ref 8.5–10.1)
CHLORIDE SERPL-SCNC: 91 MMOL/L — LOW (ref 96–108)
CO2 SERPL-SCNC: 28 MMOL/L — SIGNIFICANT CHANGE UP (ref 22–31)
CREAT SERPL-MCNC: 0.54 MG/DL — SIGNIFICANT CHANGE UP (ref 0.5–1.3)
CULTURE RESULTS: SIGNIFICANT CHANGE UP
CULTURE RESULTS: SIGNIFICANT CHANGE UP
EGFR: 113 ML/MIN/1.73M2 — SIGNIFICANT CHANGE UP
ESTIMATED AVERAGE GLUCOSE: 160 MG/DL — HIGH (ref 68–114)
GLUCOSE BLDC GLUCOMTR-MCNC: 251 MG/DL — HIGH (ref 70–99)
GLUCOSE BLDC GLUCOMTR-MCNC: 252 MG/DL — HIGH (ref 70–99)
GLUCOSE BLDC GLUCOMTR-MCNC: 287 MG/DL — HIGH (ref 70–99)
GLUCOSE BLDC GLUCOMTR-MCNC: 335 MG/DL — HIGH (ref 70–99)
GLUCOSE SERPL-MCNC: 281 MG/DL — HIGH (ref 70–99)
HCT VFR BLD CALC: 57.5 % — CRITICAL HIGH (ref 39–50)
HGB BLD-MCNC: 18.3 G/DL — HIGH (ref 13–17)
MAGNESIUM SERPL-MCNC: 2.2 MG/DL — SIGNIFICANT CHANGE UP (ref 1.6–2.6)
MCHC RBC-ENTMCNC: 31.8 G/DL — LOW (ref 32–36)
MCHC RBC-ENTMCNC: 32 PG — SIGNIFICANT CHANGE UP (ref 27–34)
MCV RBC AUTO: 100.7 FL — HIGH (ref 80–100)
NRBC # BLD: 0 /100 WBCS — SIGNIFICANT CHANGE UP (ref 0–0)
PHOSPHATE SERPL-MCNC: 3.8 MG/DL — SIGNIFICANT CHANGE UP (ref 2.5–4.5)
PLATELET # BLD AUTO: 134 K/UL — LOW (ref 150–400)
POTASSIUM SERPL-MCNC: 4.5 MMOL/L — SIGNIFICANT CHANGE UP (ref 3.5–5.3)
POTASSIUM SERPL-SCNC: 4.5 MMOL/L — SIGNIFICANT CHANGE UP (ref 3.5–5.3)
RBC # BLD: 5.71 M/UL — SIGNIFICANT CHANGE UP (ref 4.2–5.8)
RBC # FLD: 12.8 % — SIGNIFICANT CHANGE UP (ref 10.3–14.5)
SODIUM SERPL-SCNC: 134 MMOL/L — LOW (ref 135–145)
SPECIMEN SOURCE: SIGNIFICANT CHANGE UP
SPECIMEN SOURCE: SIGNIFICANT CHANGE UP
WBC # BLD: 7.61 K/UL — SIGNIFICANT CHANGE UP (ref 3.8–10.5)
WBC # FLD AUTO: 7.61 K/UL — SIGNIFICANT CHANGE UP (ref 3.8–10.5)

## 2022-05-14 PROCEDURE — 99232 SBSQ HOSP IP/OBS MODERATE 35: CPT

## 2022-05-14 RX ORDER — INSULIN LISPRO 100/ML
VIAL (ML) SUBCUTANEOUS AT BEDTIME
Refills: 0 | Status: DISCONTINUED | OUTPATIENT
Start: 2022-05-14 | End: 2022-05-16

## 2022-05-14 RX ADMIN — BUDESONIDE AND FORMOTEROL FUMARATE DIHYDRATE 2 PUFF(S): 160; 4.5 AEROSOL RESPIRATORY (INHALATION) at 06:03

## 2022-05-14 RX ADMIN — Medication 1 PATCH: at 08:07

## 2022-05-14 RX ADMIN — Medication 6: at 17:10

## 2022-05-14 RX ADMIN — Medication 6: at 12:13

## 2022-05-14 RX ADMIN — Medication 1 PATCH: at 20:00

## 2022-05-14 RX ADMIN — Medication 1 SPRAY(S): at 17:11

## 2022-05-14 RX ADMIN — Medication 1 SPRAY(S): at 06:13

## 2022-05-14 RX ADMIN — Medication 3 MILLILITER(S): at 17:01

## 2022-05-14 RX ADMIN — Medication 40 MILLIGRAM(S): at 06:13

## 2022-05-14 RX ADMIN — Medication 6: at 08:21

## 2022-05-14 RX ADMIN — Medication 3 MILLILITER(S): at 11:58

## 2022-05-14 RX ADMIN — ENOXAPARIN SODIUM 40 MILLIGRAM(S): 100 INJECTION SUBCUTANEOUS at 12:12

## 2022-05-14 RX ADMIN — Medication 4: at 21:55

## 2022-05-14 RX ADMIN — Medication 3 MILLILITER(S): at 06:02

## 2022-05-14 RX ADMIN — Medication 81 MILLIGRAM(S): at 12:12

## 2022-05-14 RX ADMIN — SODIUM CHLORIDE 50 MILLILITER(S): 9 INJECTION, SOLUTION INTRAVENOUS at 12:12

## 2022-05-14 RX ADMIN — Medication 3 MILLILITER(S): at 00:54

## 2022-05-14 RX ADMIN — Medication 1 PATCH: at 12:14

## 2022-05-14 RX ADMIN — BUDESONIDE AND FORMOTEROL FUMARATE DIHYDRATE 2 PUFF(S): 160; 4.5 AEROSOL RESPIRATORY (INHALATION) at 17:13

## 2022-05-14 RX ADMIN — Medication 1 PATCH: at 12:12

## 2022-05-14 NOTE — PROGRESS NOTE ADULT - ASSESSMENT
61-year old M current 45 pack year smoker with a PMH of HTN, type 2 DM, OA, reported COPD on home O2 ( however never had formal diagnosis with PFTs), last hospitalized in 10/2019 and noted to have polycythemia presumed to be 2/2 to chronic hypoxia, now p/w SOB found to have acute on chronic resp failure with hypoxia and hypercapnia in the setting of a parainfluenza infection with bronchiolitis.

## 2022-05-14 NOTE — PROGRESS NOTE ADULT - SUBJECTIVE AND OBJECTIVE BOX
Patient is a 61y old  Male who presents with a chief complaint of shortness of breath (13 May 2022 18:27)    INTERVAL HPI/OVERNIGHT EVENTS:  Pt was seen and examined, no acute events.    MEDICATIONS  (STANDING):  albuterol/ipratropium for Nebulization 3 milliLiter(s) Nebulizer every 6 hours  aspirin  chewable 81 milliGRAM(s) Oral daily  budesonide 160 MICROgram(s)/formoterol 4.5 MICROgram(s) Inhaler 2 Puff(s) Inhalation two times a day  dextrose 5%. 1000 milliLiter(s) (50 mL/Hr) IV Continuous <Continuous>  dextrose 50% Injectable 25 Gram(s) IV Push once  enoxaparin Injectable 40 milliGRAM(s) SubCutaneous every 24 hours  fluticasone propionate 50 MICROgram(s)/spray Nasal Spray 1 Spray(s) Both Nostrils two times a day  glucagon  Injectable 1 milliGRAM(s) IntraMuscular once  insulin lispro (ADMELOG) corrective regimen sliding scale   SubCutaneous three times a day before meals  lactated ringers. 500 milliLiter(s) (50 mL/Hr) IV Continuous <Continuous>  nicotine - 21 mG/24Hr(s) Patch 1 patch Transdermal daily  predniSONE   Tablet 40 milliGRAM(s) Oral daily  sodium chloride 3%  Inhalation 4 milliLiter(s) Inhalation every 6 hours    MEDICATIONS  (PRN):  acetaminophen     Tablet .. 650 milliGRAM(s) Oral every 6 hours PRN Temp greater or equal to 38C (100.4F), Mild Pain (1 - 3)  aluminum hydroxide/magnesium hydroxide/simethicone Suspension 30 milliLiter(s) Oral every 4 hours PRN Dyspepsia  benzonatate 100 milliGRAM(s) Oral every 8 hours PRN Cough  dextrose Oral Gel 15 Gram(s) Oral once PRN Blood Glucose LESS THAN 70 milliGRAM(s)/deciliter  melatonin 3 milliGRAM(s) Oral at bedtime PRN Insomnia  ondansetron Injectable 4 milliGRAM(s) IV Push every 8 hours PRN Nausea and/or Vomiting      Allergies  No Known Allergies    Vital Signs Last 24 Hrs  T(C): 36.4 (14 May 2022 11:14), Max: 37.1 (14 May 2022 05:16)  T(F): 97.5 (14 May 2022 11:14), Max: 98.7 (14 May 2022 05:16)  HR: 88 (14 May 2022 12:04) (71 - 90)  BP: 123/75 (14 May 2022 11:14) (123/75 - 136/80)  BP(mean): --  RR: 19 (14 May 2022 11:14) (16 - 19)  SpO2: 96% (14 May 2022 12:04) (89% - 97%)      PHYSICAL EXAM:  GENERAL: NAD  HEAD:  Atraumatic  EYES: PERRLA  NERVOUS SYSTEM:  Awake, alert  CHEST/LUNG: Clear  HEART: RRR, S1, S2  ABDOMEN: Soft, non tender  EXTREMITIES:  no edema      LABS:                        18.3   7.61  )-----------( 134      ( 14 May 2022 08:17 )             57.5     05-14    134<L>  |  91<L>  |  25<H>  ----------------------------<  281<H>  4.5   |  28  |  0.54    Ca    8.7      14 May 2022 08:17  Phos  3.8     05-14  Mg     2.2     05-14          CAPILLARY BLOOD GLUCOSE      POCT Blood Glucose.: 251 mg/dL (14 May 2022 12:10)  POCT Blood Glucose.: 287 mg/dL (14 May 2022 07:52)  POCT Blood Glucose.: 333 mg/dL (13 May 2022 21:28)  POCT Blood Glucose.: 293 mg/dL (13 May 2022 17:14)      RADIOLOGY & ADDITIONAL TESTS:    Imaging Personally Reviewed:  [ ] YES  [ ] NO    Consultant(s) Notes Reviewed:  [ ] YES  [ ] NO    Care Discussed with Consultants/Other Providers [ ] YES  [ ] NO

## 2022-05-14 NOTE — PROVIDER CONTACT NOTE (CRITICAL VALUE NOTIFICATION) - TEST AND RESULT REPORTED:
H/H- 18.4 / 60.8
hematorcrit - 60.9
lactate- 2.4
H&H  H&H  Hemoglobin: 19.4 Hematocrit: 62.6
hgb- 19.3 hct- 63.0
HEMATOCRIT 57.5
H/H

## 2022-05-14 NOTE — PROVIDER CONTACT NOTE (CRITICAL VALUE NOTIFICATION) - PERSON GIVING RESULT:
YADY
Paula Ramsey
[facility]
emelyn riley
rama valadez
ESTUARDO Kapadia

## 2022-05-15 LAB
% ALBUMIN: 42 % — SIGNIFICANT CHANGE UP
% ALPHA 1: 4.6 % — SIGNIFICANT CHANGE UP
% ALPHA 2: 28.5 % — SIGNIFICANT CHANGE UP
% BETA: 11.9 % — SIGNIFICANT CHANGE UP
% GAMMA: 13 % — SIGNIFICANT CHANGE UP
ALBUMIN SERPL ELPH-MCNC: 2.7 G/DL — LOW (ref 3.6–5.5)
ALBUMIN/GLOB SERPL ELPH: 0.7 RATIO — SIGNIFICANT CHANGE UP
ALPHA1 GLOB SERPL ELPH-MCNC: 0.3 G/DL — SIGNIFICANT CHANGE UP (ref 0.1–0.4)
ALPHA2 GLOB SERPL ELPH-MCNC: 1.8 G/DL — HIGH (ref 0.5–1)
B-GLOBULIN SERPL ELPH-MCNC: 0.8 G/DL — SIGNIFICANT CHANGE UP (ref 0.5–1)
FLUAV AG NPH QL: SIGNIFICANT CHANGE UP
FLUBV AG NPH QL: SIGNIFICANT CHANGE UP
GAMMA GLOBULIN: 0.8 G/DL — SIGNIFICANT CHANGE UP (ref 0.6–1.6)
GLUCOSE BLDC GLUCOMTR-MCNC: 223 MG/DL — HIGH (ref 70–99)
GLUCOSE BLDC GLUCOMTR-MCNC: 277 MG/DL — HIGH (ref 70–99)
GLUCOSE BLDC GLUCOMTR-MCNC: 302 MG/DL — HIGH (ref 70–99)
GLUCOSE BLDC GLUCOMTR-MCNC: 358 MG/DL — HIGH (ref 70–99)
INTERPRETATION SERPL IFE-IMP: SIGNIFICANT CHANGE UP
PROT PATTERN SERPL ELPH-IMP: SIGNIFICANT CHANGE UP
SARS-COV-2 RNA SPEC QL NAA+PROBE: SIGNIFICANT CHANGE UP

## 2022-05-15 PROCEDURE — 99232 SBSQ HOSP IP/OBS MODERATE 35: CPT

## 2022-05-15 RX ADMIN — ENOXAPARIN SODIUM 40 MILLIGRAM(S): 100 INJECTION SUBCUTANEOUS at 12:52

## 2022-05-15 RX ADMIN — BUDESONIDE AND FORMOTEROL FUMARATE DIHYDRATE 2 PUFF(S): 160; 4.5 AEROSOL RESPIRATORY (INHALATION) at 06:18

## 2022-05-15 RX ADMIN — Medication 1 PATCH: at 12:57

## 2022-05-15 RX ADMIN — Medication 4: at 22:49

## 2022-05-15 RX ADMIN — BUDESONIDE AND FORMOTEROL FUMARATE DIHYDRATE 2 PUFF(S): 160; 4.5 AEROSOL RESPIRATORY (INHALATION) at 17:51

## 2022-05-15 RX ADMIN — Medication 3 MILLILITER(S): at 11:03

## 2022-05-15 RX ADMIN — Medication 1 PATCH: at 21:15

## 2022-05-15 RX ADMIN — Medication 1 SPRAY(S): at 06:18

## 2022-05-15 RX ADMIN — Medication 81 MILLIGRAM(S): at 12:52

## 2022-05-15 RX ADMIN — Medication 1 PATCH: at 12:53

## 2022-05-15 RX ADMIN — Medication 4: at 08:51

## 2022-05-15 RX ADMIN — Medication 3 MILLILITER(S): at 00:47

## 2022-05-15 RX ADMIN — Medication 3 MILLILITER(S): at 17:00

## 2022-05-15 RX ADMIN — Medication 6: at 17:49

## 2022-05-15 RX ADMIN — Medication 3 MILLILITER(S): at 05:21

## 2022-05-15 RX ADMIN — Medication 40 MILLIGRAM(S): at 06:18

## 2022-05-15 RX ADMIN — Medication 10: at 12:54

## 2022-05-15 RX ADMIN — Medication 3 MILLILITER(S): at 23:35

## 2022-05-15 RX ADMIN — Medication 1 SPRAY(S): at 17:48

## 2022-05-15 NOTE — PROGRESS NOTE ADULT - PROBLEM SELECTOR PLAN 2
by presentation and history however patient without history of formal PFT diagnosis   -hypertonic saline inhalation Aerobika and IS perscribed   -Patient is retaining CT based on abg, repeat abg in am   -Continue BIPap with nasal cannula for meals   -Avi   -Appreciate pulmonary consult
by presentation and history however patient without history of formal PFT diagnosis   -Aerobika and IS perscribed   -Patient is retaining C02 based on abg, repeat abg in am   -Continue BIPap with nasal cannula for meals   -Avi   -Appreciate pulmonary consult  will need oxygen and noninvasive ventilator for home
by presentation and history however patient without history of formal PFT diagnosis   -hypertonic saline inhalation (supply limited in house will switch to mucomyst tomorrow)  -Patient is retaining CT based on abg, repeat abg in am   -Continue BIPap with nasal cannula for meals   -Avi   -Appreciate pulmonary consult
Acute on chronic resp failure with hypoxia and hypercapnia/ Parainfluenza bronchiolitis:  - Continues to improve  - Supplemental O2@ titrated down to 3 L NC, goal to keep saturation > 90 % (has Home O2)  - Continue prednsione taper over 10 days   - Remains on nocturnal BiPAP 10/7/30 % and prn during the day. However, pt reports that he si unable to tolerate BiPAP support for > 2-3 hours. Danger of progressive hypercapnia including death was explained to patient.   - Cont nebulized hypertonic saline Q6H preceded by Duonebs Q6H to assist with mobilization of secretions   - Pt unable to tolerate aerobika  - Symbicort BID and Spiriva daily once no longer requiring standing nebs.   - Started Tessalon perle Q8H prn for cough   - Add Flonase BID for post nasal drip  - S/P 5 day course of Azithromycin for possible superimposed bacterial bronchitis.   - TTE w/ bubble study was negative for a shunt. Polycythemia likely secondary to chronic hypoxia as pt reports he does not use O2 during the day. Requires outpt heme FUP for work up if polycythemia worsens/ fails to improve despite correction of chronic hypoxia   - No clinical indication to warrant therapeutic phlebotomy at present. Infact this will likely worsen his resp symptoms and hypoxia   - Incentive spirometry   - Cont  Nicotine patch   - Has home O2  - He will require outpatient pulmonary follow with PFTs, a sleep study and repeat CT chest.
Acute on chronic resp failure with hypoxia and hypercapnia/ Parainfluenza bronchiolitis:  - Continues to slowly improve  - Supplemental O@ titrated down to 3 L NC, goal to keep saturation > 90 % ) has Home O2)  - Continue prednsione taper over 10 days   - Morning ABG showing PCO2 down to 69 and serum bicarb is trending down. Mental status remains stable.  - Remains on nocturnal BiPAP 10/7/30 % and prn during the day. However, pt reports that he si unable to tolerate BiPAP support for > 2-3 hours. Danger of progressive hypercapnia including death was explained to patient.   - Cont nebulized hypertonic saline Q6H preceded by Duonebs Q6H to assist with mobilization of secretions   - Pt unable to tolerate aerobika  - Symbicort BID and Spiriva daily once no longer requiring standing nebs.   - Started Tessalon perle Q8H prn for cough   - Add Flonase BID for post nasal drip  - S/P 5 day course of Azithromycin for possible superimposed bacterial bronchitis.   - TTE w/ bubble study was negative for a shunt. Polycythemia likely secondary to chronic hypoxia as pt reports he does not use O2 during the day. Requires outpt heme FUP for work up if polycythemia worsens/ fails to improve despite correction of chronic hypoxia   - No clinical indication to warrant therapeutic phlebotomy at present. Infact this will likely worsen his resp symptoms and hypoxia   - Incentive spirometry   - Cont  Nicotine patch   - Has home O2  - He will require outpatient pulmonary follow with PFTs, a sleep study and repeat CT chest.
by presentation and history however patient without history of formal PFT diagnosis   -Aerobika and IS perscribed   -Patient is retaining C02 based on abg, repeat abg in am   -Continue BIPap with nasal cannula for meals   -Avi   -Appreciate pulmonary consult  will need oxygen and noninvasive ventilator for home

## 2022-05-15 NOTE — PROGRESS NOTE ADULT - PROBLEM SELECTOR PROBLEM 4
DM type 2, not at goal

## 2022-05-15 NOTE — PROGRESS NOTE ADULT - PROBLEM SELECTOR PROBLEM 3
Parainfluenza virus infection

## 2022-05-15 NOTE — PROGRESS NOTE ADULT - SUBJECTIVE AND OBJECTIVE BOX
Patient is a 61y old  Male who presents with a chief complaint of shortness of breath (13 May 2022 18:27)    INTERVAL HPI/OVERNIGHT EVENTS:  Pt was seen and examined, no acute events.    MEDICATIONS  (STANDING):  albuterol/ipratropium for Nebulization 3 milliLiter(s) Nebulizer every 6 hours  aspirin  chewable 81 milliGRAM(s) Oral daily  budesonide 160 MICROgram(s)/formoterol 4.5 MICROgram(s) Inhaler 2 Puff(s) Inhalation two times a day  dextrose 5%. 1000 milliLiter(s) (50 mL/Hr) IV Continuous <Continuous>  dextrose 50% Injectable 25 Gram(s) IV Push once  enoxaparin Injectable 40 milliGRAM(s) SubCutaneous every 24 hours  fluticasone propionate 50 MICROgram(s)/spray Nasal Spray 1 Spray(s) Both Nostrils two times a day  glucagon  Injectable 1 milliGRAM(s) IntraMuscular once  insulin lispro (ADMELOG) corrective regimen sliding scale   SubCutaneous at bedtime  insulin lispro (ADMELOG) corrective regimen sliding scale   SubCutaneous three times a day before meals  lactated ringers. 500 milliLiter(s) (50 mL/Hr) IV Continuous <Continuous>  nicotine - 21 mG/24Hr(s) Patch 1 patch Transdermal daily  predniSONE   Tablet 40 milliGRAM(s) Oral daily  sodium chloride 3%  Inhalation 4 milliLiter(s) Inhalation every 6 hours    MEDICATIONS  (PRN):  acetaminophen     Tablet .. 650 milliGRAM(s) Oral every 6 hours PRN Temp greater or equal to 38C (100.4F), Mild Pain (1 - 3)  aluminum hydroxide/magnesium hydroxide/simethicone Suspension 30 milliLiter(s) Oral every 4 hours PRN Dyspepsia  benzonatate 100 milliGRAM(s) Oral every 8 hours PRN Cough  dextrose Oral Gel 15 Gram(s) Oral once PRN Blood Glucose LESS THAN 70 milliGRAM(s)/deciliter  melatonin 3 milliGRAM(s) Oral at bedtime PRN Insomnia  ondansetron Injectable 4 milliGRAM(s) IV Push every 8 hours PRN Nausea and/or Vomiting      Allergies  No Known Allergies      Vital Signs Last 24 Hrs  T(C): 36.7 (15 May 2022 11:55), Max: 36.7 (15 May 2022 11:55)  T(F): 98.1 (15 May 2022 11:55), Max: 98.1 (15 May 2022 11:55)  HR: 98 (15 May 2022 11:55) (80 - 98)  BP: 100/66 (15 May 2022 11:55) (100/66 - 127/75)  BP(mean): --  RR: 18 (15 May 2022 11:55) (18 - 18)  SpO2: 98% (15 May 2022 11:55) (90% - 98%)      PHYSICAL EXAM:  GENERAL: NAD  HEAD:  Atraumatic  EYES: PERRLA  NERVOUS SYSTEM:  Awake, alert  CHEST/LUNG: Clear  HEART: RRR, S1, S2  ABDOMEN: Soft, non tender  EXTREMITIES:  no edema      LABS:                        18.3   7.61  )-----------( 134      ( 14 May 2022 08:17 )             57.5     05-14    134<L>  |  91<L>  |  25<H>  ----------------------------<  281<H>  4.5   |  28  |  0.54    Ca    8.7      14 May 2022 08:17  Phos  3.8     05-14  Mg     2.2     05-14          CAPILLARY BLOOD GLUCOSE      POCT Blood Glucose.: 358 mg/dL (15 May 2022 11:21)  POCT Blood Glucose.: 223 mg/dL (15 May 2022 08:11)  POCT Blood Glucose.: 335 mg/dL (14 May 2022 21:17)  POCT Blood Glucose.: 252 mg/dL (14 May 2022 17:06)      RADIOLOGY & ADDITIONAL TESTS:    Imaging Personally Reviewed:  [ ] YES  [ ] NO    Consultant(s) Notes Reviewed:  [ ] YES  [ ] NO    Care Discussed with Consultants/Other Providers [ ] YES  [ ] NO1

## 2022-05-15 NOTE — PROGRESS NOTE ADULT - PROBLEM SELECTOR PLAN 4
A1C 6.9   continue sliding scale   diabetic diet

## 2022-05-15 NOTE — PROGRESS NOTE ADULT - PROVIDER SPECIALTY LIST ADULT
Pulmonology
Heme/Onc
Pulmonology
Pulmonology
Hospitalist

## 2022-05-15 NOTE — PROGRESS NOTE ADULT - PROBLEM SELECTOR PLAN 1
secondary to acute parainfluenza infection   -supportive care for viral parainfluenza  -s/p azithromycin
secondary to acute parainfluenza infection   -supportive care for viral parainfluenza  -sputum culture   -continue azithromycin for antiinflammatory effects as well as antimicrobial
- patient feeling well  - case was d/w Pulmonary Dr Lee - who felt Breathing may worsen with theraputic phlebotomy, so will defer for now  - watch counts  - asprin, DVT prophylaxsis, Oxygen  - will sign off, please re-consult if needed
secondary to acute parainfluenza infection   -supportive care for viral parainfluenza  -s/p azithromycin for antiinflammatory effects as well as antimicrobial
secondary to acute parainfluenza infection   -supportive care for viral parainfluenza  -sputum culture   -continue azithromycin for antiinflammatory effects as well as antimicrobial

## 2022-05-15 NOTE — PROGRESS NOTE ADULT - PROBLEM SELECTOR PLAN 5
currently off medications continue to monitor

## 2022-05-15 NOTE — PROGRESS NOTE ADULT - PROBLEM SELECTOR PROBLEM 1
Sepsis with acute hypoxic respiratory failure
Hypoxemic polycythemia
Sepsis with acute hypoxic respiratory failure

## 2022-05-16 VITALS
SYSTOLIC BLOOD PRESSURE: 134 MMHG | TEMPERATURE: 98 F | DIASTOLIC BLOOD PRESSURE: 86 MMHG | OXYGEN SATURATION: 92 % | HEART RATE: 86 BPM | RESPIRATION RATE: 18 BRPM

## 2022-05-16 LAB
FLUAV AG NPH QL: SIGNIFICANT CHANGE UP
FLUBV AG NPH QL: SIGNIFICANT CHANGE UP
GLUCOSE BLDC GLUCOMTR-MCNC: 261 MG/DL — HIGH (ref 70–99)
GLUCOSE BLDC GLUCOMTR-MCNC: 291 MG/DL — HIGH (ref 70–99)
GLUCOSE BLDC GLUCOMTR-MCNC: 338 MG/DL — HIGH (ref 70–99)
SARS-COV-2 RNA SPEC QL NAA+PROBE: SIGNIFICANT CHANGE UP

## 2022-05-16 PROCEDURE — 99239 HOSP IP/OBS DSCHRG MGMT >30: CPT

## 2022-05-16 RX ORDER — TIOTROPIUM BROMIDE 18 UG/1
1 CAPSULE ORAL; RESPIRATORY (INHALATION)
Qty: 30 | Refills: 0
Start: 2022-05-16 | End: 2022-06-14

## 2022-05-16 RX ORDER — ALBUTEROL 90 UG/1
2 AEROSOL, METERED ORAL
Qty: 1 | Refills: 3
Start: 2022-05-16 | End: 2022-09-12

## 2022-05-16 RX ADMIN — Medication 1 PATCH: at 08:43

## 2022-05-16 RX ADMIN — BUDESONIDE AND FORMOTEROL FUMARATE DIHYDRATE 2 PUFF(S): 160; 4.5 AEROSOL RESPIRATORY (INHALATION) at 17:12

## 2022-05-16 RX ADMIN — Medication 8: at 12:53

## 2022-05-16 RX ADMIN — Medication 1 PATCH: at 13:05

## 2022-05-16 RX ADMIN — Medication 3 MILLILITER(S): at 11:44

## 2022-05-16 RX ADMIN — Medication 1 SPRAY(S): at 06:31

## 2022-05-16 RX ADMIN — Medication 6: at 16:17

## 2022-05-16 RX ADMIN — BUDESONIDE AND FORMOTEROL FUMARATE DIHYDRATE 2 PUFF(S): 160; 4.5 AEROSOL RESPIRATORY (INHALATION) at 06:31

## 2022-05-16 RX ADMIN — Medication 1 SPRAY(S): at 17:12

## 2022-05-16 RX ADMIN — Medication 81 MILLIGRAM(S): at 12:53

## 2022-05-16 RX ADMIN — Medication 3 MILLILITER(S): at 05:35

## 2022-05-16 RX ADMIN — Medication 1 PATCH: at 13:04

## 2022-05-16 RX ADMIN — Medication 6: at 09:19

## 2022-05-16 RX ADMIN — ENOXAPARIN SODIUM 40 MILLIGRAM(S): 100 INJECTION SUBCUTANEOUS at 12:53

## 2022-05-16 RX ADMIN — Medication 40 MILLIGRAM(S): at 06:31

## 2022-05-16 NOTE — DISCHARGE NOTE NURSING/CASE MANAGEMENT/SOCIAL WORK - PATIENT PORTAL LINK FT
You can access the FollowMyHealth Patient Portal offered by [facility] by registering at the following website: [email or web address]. By joining [employer]’s FollowMyHealth portal, you will also be able to view your health information using other applications (apps) compatible with our system.

## 2022-05-16 NOTE — DISCHARGE NOTE NURSING/CASE MANAGEMENT/SOCIAL WORK - NSDCPEFALRISK_GEN_ALL_CORE
For information on Fall & Injury Prevention, visit: [email or web address] OR  [email or web address] OR  https://www.cdc.gov/steadi/patient.html

## 2022-05-16 NOTE — CHART NOTE - NSCHARTNOTEFT_GEN_A_CORE
As per patient, patient has history of COPD
Need For NIV    Medicine Hospitalist PA    Patient is being discharged on NIV, he has chronic respiratory failure 2/2 COPD. BIPAP settings 14/7 was considered and ruled out as it could not properly control patients CO2 levels while on bipap. Recent PCO2 levels 83mmHG on 5/12/2022. Patient is in chronic respiratory failure with hypercapnia and hypoxia 2/2 COPD, therefore patient will require Augmented Guaranteed Targeted Tidal Volumes and have the ability for daytime mouthpiece ventilation treatments not available on any Homecare pap devices, without NIV patient will continue with clinical decline and high risk for hospital readmissions.
: Kye Lee MD     INDICATION: Hypoxic/ hypercapnic resp failure     PROCEDURE:  [ ] LIMITED ECHO  [x ] LIMITED CHEST  [ ] LIMITED RETROPERITONEAL  [ ] LIMITED ABDOMINAL  [ ] LIMITED DVT  [ ] NEEDLE GUIDANCE VASCULAR  [ ] NEEDLE GUIDANCE THORACENTESIS  [ ] NEEDLE GUIDANCE PARACENTESIS  [ ] NEEDLE GUIDANCE PERICARDIOCENTESIS  [ ] OTHER    FINDINGS:    A-lines in b/l lungs, 2-3 scattered focal B-lines at R lung base, no consolidations or pleural effusions    INTERPRETATION:     Normal aeration pattern, dry lungs, no ultrasound evidence of pneumonia

## 2022-05-23 DIAGNOSIS — F17.210 NICOTINE DEPENDENCE, CIGARETTES, UNCOMPLICATED: ICD-10-CM

## 2022-05-23 DIAGNOSIS — J21.8 ACUTE BRONCHIOLITIS DUE TO OTHER SPECIFIED ORGANISMS: ICD-10-CM

## 2022-05-23 DIAGNOSIS — A41.9 SEPSIS, UNSPECIFIED ORGANISM: ICD-10-CM

## 2022-05-23 DIAGNOSIS — Z79.52 LONG TERM (CURRENT) USE OF SYSTEMIC STEROIDS: ICD-10-CM

## 2022-05-23 DIAGNOSIS — D75.1 SECONDARY POLYCYTHEMIA: ICD-10-CM

## 2022-05-23 DIAGNOSIS — E11.9 TYPE 2 DIABETES MELLITUS WITHOUT COMPLICATIONS: ICD-10-CM

## 2022-05-23 DIAGNOSIS — Z99.81 DEPENDENCE ON SUPPLEMENTAL OXYGEN: ICD-10-CM

## 2022-05-23 DIAGNOSIS — J96.01 ACUTE RESPIRATORY FAILURE WITH HYPOXIA: ICD-10-CM

## 2022-05-23 DIAGNOSIS — I10 ESSENTIAL (PRIMARY) HYPERTENSION: ICD-10-CM

## 2022-05-23 DIAGNOSIS — J96.22 ACUTE AND CHRONIC RESPIRATORY FAILURE WITH HYPERCAPNIA: ICD-10-CM

## 2022-05-23 DIAGNOSIS — J96.21 ACUTE AND CHRONIC RESPIRATORY FAILURE WITH HYPOXIA: ICD-10-CM

## 2022-05-23 DIAGNOSIS — Z79.82 LONG TERM (CURRENT) USE OF ASPIRIN: ICD-10-CM

## 2022-05-23 DIAGNOSIS — M19.90 UNSPECIFIED OSTEOARTHRITIS, UNSPECIFIED SITE: ICD-10-CM

## 2022-05-23 DIAGNOSIS — J44.1 CHRONIC OBSTRUCTIVE PULMONARY DISEASE WITH (ACUTE) EXACERBATION: ICD-10-CM

## 2022-05-23 DIAGNOSIS — Z79.899 OTHER LONG TERM (CURRENT) DRUG THERAPY: ICD-10-CM

## 2022-07-25 ENCOUNTER — INPATIENT (INPATIENT)
Facility: HOSPITAL | Age: 62
LOS: 4 days | Discharge: LEFT BEFORE DISCHARGE | End: 2022-07-30
Attending: INTERNAL MEDICINE | Admitting: INTERNAL MEDICINE

## 2022-07-25 VITALS
RESPIRATION RATE: 26 BRPM | HEART RATE: 101 BPM | SYSTOLIC BLOOD PRESSURE: 123 MMHG | TEMPERATURE: 100 F | OXYGEN SATURATION: 86 % | WEIGHT: 173.06 LBS | DIASTOLIC BLOOD PRESSURE: 75 MMHG | HEIGHT: 68 IN

## 2022-07-25 DIAGNOSIS — Z98.890 OTHER SPECIFIED POSTPROCEDURAL STATES: Chronic | ICD-10-CM

## 2022-07-25 LAB
ALBUMIN SERPL ELPH-MCNC: 2.6 G/DL — LOW (ref 3.3–5)
ALP SERPL-CCNC: 51 U/L — SIGNIFICANT CHANGE UP (ref 40–120)
ALT FLD-CCNC: 16 U/L — SIGNIFICANT CHANGE UP (ref 12–78)
ANION GAP SERPL CALC-SCNC: 3 MMOL/L — LOW (ref 5–17)
APTT BLD: 29.7 SEC — SIGNIFICANT CHANGE UP (ref 27.5–35.5)
AST SERPL-CCNC: 11 U/L — LOW (ref 15–37)
BASE EXCESS BLDA CALC-SCNC: 8 MMOL/L — HIGH (ref -2–3)
BASOPHILS # BLD AUTO: 0.05 K/UL — SIGNIFICANT CHANGE UP (ref 0–0.2)
BASOPHILS NFR BLD AUTO: 0.5 % — SIGNIFICANT CHANGE UP (ref 0–2)
BILIRUB SERPL-MCNC: 0.2 MG/DL — SIGNIFICANT CHANGE UP (ref 0.2–1.2)
BLOOD GAS COMMENTS ARTERIAL: SIGNIFICANT CHANGE UP
BUN SERPL-MCNC: 12 MG/DL — SIGNIFICANT CHANGE UP (ref 7–23)
CALCIUM SERPL-MCNC: 8.7 MG/DL — SIGNIFICANT CHANGE UP (ref 8.5–10.1)
CHLORIDE SERPL-SCNC: 105 MMOL/L — SIGNIFICANT CHANGE UP (ref 96–108)
CO2 BLDA-SCNC: 36 MMOL/L — HIGH (ref 19–24)
CO2 SERPL-SCNC: 33 MMOL/L — HIGH (ref 22–31)
CREAT SERPL-MCNC: 0.46 MG/DL — LOW (ref 0.5–1.3)
EGFR: 119 ML/MIN/1.73M2 — SIGNIFICANT CHANGE UP
EOSINOPHIL # BLD AUTO: 0.13 K/UL — SIGNIFICANT CHANGE UP (ref 0–0.5)
EOSINOPHIL NFR BLD AUTO: 1.4 % — SIGNIFICANT CHANGE UP (ref 0–6)
FLUAV AG NPH QL: SIGNIFICANT CHANGE UP
FLUBV AG NPH QL: SIGNIFICANT CHANGE UP
GAS PNL BLDA: SIGNIFICANT CHANGE UP
GLUCOSE SERPL-MCNC: 193 MG/DL — HIGH (ref 70–99)
HCO3 BLDA-SCNC: 35 MMOL/L — HIGH (ref 21–28)
HCT VFR BLD CALC: 46.3 % — SIGNIFICANT CHANGE UP (ref 39–50)
HGB BLD-MCNC: 15.1 G/DL — SIGNIFICANT CHANGE UP (ref 13–17)
HOROWITZ INDEX BLDA+IHG-RTO: 30 — SIGNIFICANT CHANGE UP
IMM GRANULOCYTES NFR BLD AUTO: 1.3 % — SIGNIFICANT CHANGE UP (ref 0–1.5)
INR BLD: 1.11 RATIO — SIGNIFICANT CHANGE UP (ref 0.88–1.16)
LACTATE SERPL-SCNC: 1.2 MMOL/L — SIGNIFICANT CHANGE UP (ref 0.7–2)
LYMPHOCYTES # BLD AUTO: 0.57 K/UL — LOW (ref 1–3.3)
LYMPHOCYTES # BLD AUTO: 6.2 % — LOW (ref 13–44)
MCHC RBC-ENTMCNC: 31.3 PG — SIGNIFICANT CHANGE UP (ref 27–34)
MCHC RBC-ENTMCNC: 32.6 G/DL — SIGNIFICANT CHANGE UP (ref 32–36)
MCV RBC AUTO: 95.9 FL — SIGNIFICANT CHANGE UP (ref 80–100)
MONOCYTES # BLD AUTO: 1.21 K/UL — HIGH (ref 0–0.9)
MONOCYTES NFR BLD AUTO: 13.2 % — SIGNIFICANT CHANGE UP (ref 2–14)
NEUTROPHILS # BLD AUTO: 7.12 K/UL — SIGNIFICANT CHANGE UP (ref 1.8–7.4)
NEUTROPHILS NFR BLD AUTO: 77.4 % — HIGH (ref 43–77)
NRBC # BLD: 0 /100 WBCS — SIGNIFICANT CHANGE UP (ref 0–0)
PCO2 BLDA: 56 MMHG — HIGH (ref 32–46)
PH BLDA: 7.4 — SIGNIFICANT CHANGE UP (ref 7.35–7.45)
PLATELET # BLD AUTO: 176 K/UL — SIGNIFICANT CHANGE UP (ref 150–400)
PO2 BLDA: 88 MMHG — SIGNIFICANT CHANGE UP (ref 83–108)
POTASSIUM SERPL-MCNC: 3.9 MMOL/L — SIGNIFICANT CHANGE UP (ref 3.5–5.3)
POTASSIUM SERPL-SCNC: 3.9 MMOL/L — SIGNIFICANT CHANGE UP (ref 3.5–5.3)
PROT SERPL-MCNC: 6.9 GM/DL — SIGNIFICANT CHANGE UP (ref 6–8.3)
PROTHROM AB SERPL-ACNC: 13.3 SEC — SIGNIFICANT CHANGE UP (ref 10.5–13.4)
RBC # BLD: 4.83 M/UL — SIGNIFICANT CHANGE UP (ref 4.2–5.8)
RBC # FLD: 12.9 % — SIGNIFICANT CHANGE UP (ref 10.3–14.5)
SAO2 % BLDA: 96.1 % — SIGNIFICANT CHANGE UP (ref 94–98)
SARS-COV-2 RNA SPEC QL NAA+PROBE: DETECTED
SODIUM SERPL-SCNC: 141 MMOL/L — SIGNIFICANT CHANGE UP (ref 135–145)
WBC # BLD: 9.2 K/UL — SIGNIFICANT CHANGE UP (ref 3.8–10.5)
WBC # FLD AUTO: 9.2 K/UL — SIGNIFICANT CHANGE UP (ref 3.8–10.5)

## 2022-07-25 PROCEDURE — 71045 X-RAY EXAM CHEST 1 VIEW: CPT | Mod: 26

## 2022-07-25 PROCEDURE — 93010 ELECTROCARDIOGRAM REPORT: CPT

## 2022-07-25 PROCEDURE — 99285 EMERGENCY DEPT VISIT HI MDM: CPT

## 2022-07-25 RX ORDER — ACETAMINOPHEN 500 MG
650 TABLET ORAL ONCE
Refills: 0 | Status: COMPLETED | OUTPATIENT
Start: 2022-07-25 | End: 2022-07-25

## 2022-07-25 RX ORDER — ALBUTEROL 90 UG/1
2 AEROSOL, METERED ORAL ONCE
Refills: 0 | Status: COMPLETED | OUTPATIENT
Start: 2022-07-25 | End: 2022-07-25

## 2022-07-25 RX ADMIN — ALBUTEROL 2 PUFF(S): 90 AEROSOL, METERED ORAL at 22:43

## 2022-07-25 RX ADMIN — Medication 125 MILLIGRAM(S): at 22:42

## 2022-07-25 RX ADMIN — Medication 650 MILLIGRAM(S): at 22:42

## 2022-07-25 NOTE — ED PROVIDER NOTE - PHYSICAL EXAMINATION
GEN: Awake, alert, interactive, NAD.  HEAD AND NECK: NC/AT. Airway patent. Neck supple.   EYES:  Clear b/l. EOMI. PERRL.   ENT: Moist mucus membranes.   CARDIAC: Regular rate, regular rhythm. No evident pedal edema.    RESP/CHEST: Normal respiratory effort with no use of accessory muscles or retractions. Clear throughout on auscultation.  ABD: soft, non-distended, non-tender. No rebound, no guarding.   BACK: No midline spinal TTP. No CVAT.   EXTREMITIES: Moving all extremities with no apparent deformities.   SKIN: Warm, dry, intact normal color. No rash.   NEURO: AOx3, CN II-XII grossly intact, no focal deficits.   PSYCH: Appropriate mood and affect.  Lungs: decreased air movement, diffuse wheezing. GEN: Awake, alert, interactive, NAD.  HEAD AND NECK: NC/AT. Airway patent. Neck supple.   EYES:  Clear b/l. EOMI. PERRL.   ENT: Moist mucus membranes.   CARDIAC: Regular rate, regular rhythm. No evident pedal edema.    RESP/CHEST: Decreased air movement, diffuse wheezing.   ABD: Soft, non-distended, non-tender. No rebound, no guarding.   BACK: No midline spinal TTP. No CVAT.   EXTREMITIES: Moving all extremities with no apparent deformities.   SKIN: Warm, dry, intact normal color. No rash.   NEURO: AOx3, CN II-XII grossly intact, no focal deficits.   PSYCH: Appropriate mood and affect.

## 2022-07-25 NOTE — ED ADULT NURSE NOTE - OBJECTIVE STATEMENT
patient aox3. patient with hx of COPD and DM. Patient with c/o sob, chills, cough, dizziness with ambulation. Patient reports sick contacts at work. Patient on home O2 4.5L/min. Denies chest pain, n/v/d.

## 2022-07-25 NOTE — ED ADULT TRIAGE NOTE - SOURCE OF INFORMATION
History     Chief Complaint:  Back Pain and Abnormal Ekg    HPI   Gem Gama is a 71 year old female with a history of CAD, STEMI, CHF, and hypertension who presents with back pain. The patient has back pain for the past 1-2 days that she rates a 7/10. She also has some jaw pain and right shoulder pain, though no shoulder pain currently. Her pain decreases when she lies flat and worsens when lifting things. Because of the pain she has had more difficulty walking up the stairs than normal. She has not taken any pain medication. She was sent here from her PCP. She notes she has some stressors at home with caring for her daughter. She had similar symptoms prior to her heart attack in 2019. She reports her LAD was 100% blocked and there was another vessel that was blocked. Of note in 2019 she had her last cardiac cath. She denies chest pain, shortness of breath, or change in bowel or bladder movements.     Allergies:  Flagyl  Hydrochlorothiazide w/ triamterene  Sulfa drugs  Metoprolol  Metronidazole     Medications:    Aspirin  Lipitor  Coreg  Plavix  Nitrostat  Zoloft  Actigall  Diovan    Past Medical History:    ADHD  Anxiety  Arthritis  Asthma  C. Diff  Sleep apnea  CAD  Depression   GERD  Hypertension   Ischemic cardiomyopathy  Narcolepsy  Renal disease  Sleep apnea  Chronic rhinitis  Vitamin D deficiency  Hyperlipidemia   CHF  STEMI    Past Surgical History:    Left knee arthroplasty  Repair enterocele  Hysterectomy  Coronary angiogram  Heart cath  Intra-aortic balloon pump insertion and removal  PCI stent drug eluting  Kidney surgery x3  Bilateral total hip  Rectocele repair  Soft tissue surgery    Family History:    Hypertension   Arthritis  Cerebrovascular disease  Thyroid disease    Social History:  Smoking status: Never  Alcohol use: No  Drug use: No  Patient presents alone.  PCP: Danny Conteh    Marital Status:        Review of Systems   Respiratory: Negative for shortness of breath.   "  Cardiovascular: Negative for chest pain.   Gastrointestinal: Negative for constipation and diarrhea.   Genitourinary: Negative for difficulty urinating, dysuria and hematuria.   Musculoskeletal: Positive for back pain and myalgias.   All other systems reviewed and are negative.      Physical Exam     Patient Vitals for the past 24 hrs:   BP Temp Temp src Pulse Resp SpO2 Height Weight   12/09/20 1400 -- -- -- 57 26 97 % -- --   12/09/20 1230 119/52 -- -- 54 -- 96 % -- --   12/09/20 1115 -- -- -- 58 16 96 % -- --   12/09/20 1045 -- -- -- 54 18 97 % -- --   12/09/20 1030 -- -- -- 52 21 97 % -- --   12/09/20 1021 (!) 130/90 98.1  F (36.7  C) Oral 53 18 97 % 1.651 m (5' 5\") 79.4 kg (175 lb)      Physical Exam  Vitals: reviewed by me  General: Pt seen on [facility], cooperative, and alert to conversation  Eyes: Tracking well, clear conjunctiva BL  ENT: MMM, midline trachea.   Lungs:   No tachypnea, no accessory muscle use. No respiratory distress.   CV: Rate as above, regular rhythm.    Abd: Soft, non tender, no guarding, no rebound. Non distended  MSK: no peripheral edema or joint effusion.  No evidence of trauma  Skin: No rash, normal turgor and temperature  Neuro: Clear speech and no facial droop.  Psych: Not RIS, no e/o AH/VH      Emergency Department Course   ECG:  @ 1024  Vent. Rate 52 bpm. DE interval 152 ms. QRS duration 76 ms. QT/QTc 450/418 ms. P-R-T axis 44 -6 69.   Sinus bradycardia. Low voltage QRS. Borderline ECG.    Read by Dr. Agrawal.     Imaging:  XR, Chest, 2 Views  IMPRESSION: There are no acute infiltrates. The cardiac silhouette is  not enlarged. Pulmonary vasculature is unremarkable.  Reading per radiology.      Radiographic findings were communicated with the patient who voiced understanding of the findings.    Laboratory:  CBC: WBC 5.4, HGB 13.6,       BMP: GFR: 58 (L), o/w WNL (Creatinine: 0.97)     1031 Troponin I: <0.015  1231 Troponin I: <0.015     Hepatic panel: " Bili Direct 0.2, Bili Total 0.7, Albumin 3.6, Protein Total 7.3, Alkphos 92, ALT 25, AST 32.      Asymptomatic COVID-19 Virus by PCR: In process     Emergency Department Course:  1054 Nursing notes and vitals reviewed. I performed an exam of the patient as documented above.     EKG was done, interpretation as above.    Blood drawn. This was sent to the lab for further testing, results above.    The patient was sent for a x-ray while in the emergency department, findings above.     1308 I rechecked the patient and discussed the results of her workup thus far.     1441  I consulted with Dr. Herbert of the hospitalist services. They are in agreement to accept the patient for admission.    Findings and plan explained to the Patient who consents to admission. Discussed the patient with Dr. Herbert, who will admit the patient to a general medicine bed for further monitoring, evaluation, and treatment.       Impression & Plan    Covid-19  Gem Gama was evaluated during a global COVID-19 pandemic, which necessitated consideration that the patient might be at risk for infection with the SARS-CoV-2 virus that causes COVID-19.   Applicable protocols for evaluation were followed during the patient's care.   COVID-19 was considered as part of the patient's evaluation. The plan for testing is:  a test was obtained during this visit.     Medical Decision Making:  Gem Gama is a very pleasant 71 year old female with significant cardiovascular risk factors and a HEART score of 5 who presents to the ED with right sided chest pain and back pain. No clear cause was found, I do think she would benefit from a cardiac rule out as she is moderate risk. Her troponin is negative x2 which is reassuring but she still does have pain at rest. I appreciate that the pain is not necessarily worse on exertion, but I again do think she need to have provacative testing. I spoke to Dr. Herbert who as kindly agreed to accept care of the patient  for observation. The patient herself feels comfortable with this plan and expressed concern about going home with an undiagnosed cause of her pain. Will plan for admission as above.    Diagnosis:    ICD-10-CM    1. Chest pain, unspecified type  R07.9    2. Acute right-sided back pain, unspecified back location  M54.9    3. Coronary artery disease with angina pectoris, unspecified vessel or lesion type, unspecified whether native or transplanted heart (H)  I25.119        Disposition:  Admitted to Dr. Piedad Byrd  12/9/2020   [facility] EMERGENCY DEPT    Scribe Disclosure:  I, Shauna Byrd, am serving as a scribe at 10:54 AM on 12/9/2020 to document services personally performed by Chandu Agrawal MD based on my observations and the provider's statements to me.         Chandu Agrawal MD  12/09/20 1535     EMS

## 2022-07-25 NOTE — ED PROVIDER NOTE - CLINICAL SUMMARY MEDICAL DECISION MAKING FREE TEXT BOX
60y/o M DM, COPD pw flue like symptoms times a day worsen yesterday, febrile, hypoxic, tachycardiac on ED arrival. Plan: sepsis workup, will give Albuterol steroids, re-eval anticipate admission. 61M w/ PMH DM, COPD pw flu-like symptoms x days, worsening since yesterday. (+) febrile, hypoxic, tachycardiac on ED arrival. Plan: Sepsis w/u + ABG, Flu / COVID. Give albuterol, steroids, Tylenol. Re-eval. Anticipate admission.

## 2022-07-25 NOTE — ED PROVIDER NOTE - DOMESTIC TRAVEL HIGH RISK QUESTION
[facility] Transitions Initial Follow Up Call    Call within 2 business days of discharge: Yes    Patient: Roger Gonzalez Patient : 1970   MRN: [id number]  Reason for Admission: A/C CHF; DM  Discharge Date: 20 RARS: Readmission Risk Score: 76  Facility: [address] [place] [address]: No recent testing  PATIENT RISK FACTORS: CHF, DM, COPD    1st attempt to reach for initial [address] discharge call unsuccessful; message left requesting call back.    Therese Holt RN No

## 2022-07-25 NOTE — ED PROVIDER NOTE - OBJECTIVE STATEMENT
62 y/o M w/PMHx of COPD, DM, hernia surgery presents to the ED for SOB, chills, sweating for x2 days. Pt states he feels unsteady when walks, has sometimes dizziness, h/a, cough, feels hot or cold sweats. Pt reports his symptoms started couple days ago but worsen yesterday. Pt states he was supposed to see his pulmonologist but that didn't happen. Pt was admitted in June. Pt is on O2 at home. Pt states his  is sick. Pt is vaccinated for COVID with x2 boosters. NKDA. 61M w/ PMH DM, COPD pw SOB, hot / cold sweats, cough, h/a x days, worsening since yesterday. Pt reports feeling unsteady / dizziness w/ walking. (+) sick contact at work. Pt reports recent admission w/ similar symptoms in June, pt reports lost d/c paperwork and has not yet f/u'd w/ outpatient Pulm. Pt uses 4.5L supplemental O2 while at home, but states does not use while at work in office.     PMH as above, PSH hernia, NKDA, meds as listed, + vaccinated + boosted against COVID.

## 2022-07-25 NOTE — ED PROVIDER NOTE - PROGRESS NOTE DETAILS
W/u significant for: COVID (+), SPO2 93-95% on 4-5L NC. Will admit to hospital (d/w Dr Fox) Pt updated to results, admission. He understands / agrees w/ this plan.

## 2022-07-26 DIAGNOSIS — E11.9 TYPE 2 DIABETES MELLITUS WITHOUT COMPLICATIONS: ICD-10-CM

## 2022-07-26 DIAGNOSIS — J44.9 CHRONIC OBSTRUCTIVE PULMONARY DISEASE, UNSPECIFIED: ICD-10-CM

## 2022-07-26 DIAGNOSIS — U07.1 COVID-19: ICD-10-CM

## 2022-07-26 LAB
ALBUMIN SERPL ELPH-MCNC: 2.8 G/DL — LOW (ref 3.3–5)
ALP SERPL-CCNC: 59 U/L — SIGNIFICANT CHANGE UP (ref 40–120)
ALT FLD-CCNC: 21 U/L — SIGNIFICANT CHANGE UP (ref 12–78)
APPEARANCE UR: CLEAR — SIGNIFICANT CHANGE UP
AST SERPL-CCNC: 18 U/L — SIGNIFICANT CHANGE UP (ref 15–37)
BACTERIA # UR AUTO: ABNORMAL
BILIRUB DIRECT SERPL-MCNC: 0.1 MG/DL — SIGNIFICANT CHANGE UP (ref 0–0.3)
BILIRUB INDIRECT FLD-MCNC: 0.1 MG/DL — LOW (ref 0.2–1)
BILIRUB SERPL-MCNC: 0.2 MG/DL — SIGNIFICANT CHANGE UP (ref 0.2–1.2)
BILIRUB UR-MCNC: NEGATIVE — SIGNIFICANT CHANGE UP
COLOR SPEC: YELLOW — SIGNIFICANT CHANGE UP
CREAT SERPL-MCNC: 0.46 MG/DL — LOW (ref 0.5–1.3)
DIFF PNL FLD: ABNORMAL
EGFR: 119 ML/MIN/1.73M2 — SIGNIFICANT CHANGE UP
GLUCOSE BLDC GLUCOMTR-MCNC: 179 MG/DL — HIGH (ref 70–99)
GLUCOSE BLDC GLUCOMTR-MCNC: 188 MG/DL — HIGH (ref 70–99)
GLUCOSE BLDC GLUCOMTR-MCNC: 197 MG/DL — HIGH (ref 70–99)
GLUCOSE BLDC GLUCOMTR-MCNC: 228 MG/DL — HIGH (ref 70–99)
GLUCOSE UR QL: 1000 MG/DL
INR BLD: 1.12 RATIO — SIGNIFICANT CHANGE UP (ref 0.88–1.16)
KETONES UR-MCNC: ABNORMAL
LEUKOCYTE ESTERASE UR-ACNC: NEGATIVE — SIGNIFICANT CHANGE UP
NITRITE UR-MCNC: NEGATIVE — SIGNIFICANT CHANGE UP
PH UR: 6 — SIGNIFICANT CHANGE UP (ref 5–8)
PROT SERPL-MCNC: 7.3 GM/DL — SIGNIFICANT CHANGE UP (ref 6–8.3)
PROT UR-MCNC: 30 MG/DL
PROTHROM AB SERPL-ACNC: 13.4 SEC — SIGNIFICANT CHANGE UP (ref 10.5–13.4)
RBC CASTS # UR COMP ASSIST: NEGATIVE /HPF — SIGNIFICANT CHANGE UP (ref 0–4)
SP GR SPEC: 1.01 — SIGNIFICANT CHANGE UP (ref 1.01–1.02)
UROBILINOGEN FLD QL: NEGATIVE MG/DL — SIGNIFICANT CHANGE UP
WBC UR QL: SIGNIFICANT CHANGE UP

## 2022-07-26 PROCEDURE — 99223 1ST HOSP IP/OBS HIGH 75: CPT

## 2022-07-26 PROCEDURE — 12345: CPT | Mod: NC

## 2022-07-26 RX ORDER — DEXAMETHASONE 0.5 MG/5ML
6 ELIXIR ORAL DAILY
Refills: 0 | Status: DISCONTINUED | OUTPATIENT
Start: 2022-07-26 | End: 2022-07-30

## 2022-07-26 RX ORDER — REMDESIVIR 5 MG/ML
100 INJECTION INTRAVENOUS EVERY 24 HOURS
Refills: 0 | Status: COMPLETED | OUTPATIENT
Start: 2022-07-27 | End: 2022-07-30

## 2022-07-26 RX ORDER — GLYBURIDE-METFORMIN HYDROCHLORIDE 1.25; 25 MG/1; MG/1
1 TABLET ORAL
Qty: 0 | Refills: 0 | DISCHARGE

## 2022-07-26 RX ORDER — GLUCAGON INJECTION, SOLUTION 0.5 MG/.1ML
1 INJECTION, SOLUTION SUBCUTANEOUS ONCE
Refills: 0 | Status: DISCONTINUED | OUTPATIENT
Start: 2022-07-26 | End: 2022-07-30

## 2022-07-26 RX ORDER — DEXTROSE 50 % IN WATER 50 %
25 SYRINGE (ML) INTRAVENOUS ONCE
Refills: 0 | Status: DISCONTINUED | OUTPATIENT
Start: 2022-07-26 | End: 2022-07-30

## 2022-07-26 RX ORDER — ACETAMINOPHEN 500 MG
650 TABLET ORAL EVERY 4 HOURS
Refills: 0 | Status: DISCONTINUED | OUTPATIENT
Start: 2022-07-26 | End: 2022-07-30

## 2022-07-26 RX ORDER — TIOTROPIUM BROMIDE 18 UG/1
1 CAPSULE ORAL; RESPIRATORY (INHALATION) DAILY
Refills: 0 | Status: DISCONTINUED | OUTPATIENT
Start: 2022-07-26 | End: 2022-07-30

## 2022-07-26 RX ORDER — ASPIRIN/CALCIUM CARB/MAGNESIUM 324 MG
81 TABLET ORAL DAILY
Refills: 0 | Status: DISCONTINUED | OUTPATIENT
Start: 2022-07-26 | End: 2022-07-30

## 2022-07-26 RX ORDER — ENOXAPARIN SODIUM 100 MG/ML
40 INJECTION SUBCUTANEOUS EVERY 24 HOURS
Refills: 0 | Status: DISCONTINUED | OUTPATIENT
Start: 2022-07-26 | End: 2022-07-30

## 2022-07-26 RX ORDER — REMDESIVIR 5 MG/ML
INJECTION INTRAVENOUS
Refills: 0 | Status: COMPLETED | OUTPATIENT
Start: 2022-07-26 | End: 2022-07-30

## 2022-07-26 RX ORDER — REMDESIVIR 5 MG/ML
200 INJECTION INTRAVENOUS EVERY 24 HOURS
Refills: 0 | Status: COMPLETED | OUTPATIENT
Start: 2022-07-26 | End: 2022-07-26

## 2022-07-26 RX ORDER — DEXTROSE 50 % IN WATER 50 %
15 SYRINGE (ML) INTRAVENOUS ONCE
Refills: 0 | Status: DISCONTINUED | OUTPATIENT
Start: 2022-07-26 | End: 2022-07-30

## 2022-07-26 RX ORDER — SODIUM CHLORIDE 9 MG/ML
1000 INJECTION, SOLUTION INTRAVENOUS
Refills: 0 | Status: DISCONTINUED | OUTPATIENT
Start: 2022-07-26 | End: 2022-07-30

## 2022-07-26 RX ORDER — INSULIN LISPRO 100/ML
VIAL (ML) SUBCUTANEOUS
Refills: 0 | Status: DISCONTINUED | OUTPATIENT
Start: 2022-07-26 | End: 2022-07-30

## 2022-07-26 RX ORDER — DEXTROSE 50 % IN WATER 50 %
12.5 SYRINGE (ML) INTRAVENOUS ONCE
Refills: 0 | Status: DISCONTINUED | OUTPATIENT
Start: 2022-07-26 | End: 2022-07-30

## 2022-07-26 RX ORDER — ALBUTEROL 90 UG/1
2 AEROSOL, METERED ORAL EVERY 6 HOURS
Refills: 0 | Status: DISCONTINUED | OUTPATIENT
Start: 2022-07-26 | End: 2022-07-30

## 2022-07-26 RX ORDER — EMPAGLIFLOZIN AND LINAGLIPTIN 10; 5 MG/1; MG/1
1 TABLET, FILM COATED ORAL
Qty: 0 | Refills: 0 | DISCHARGE

## 2022-07-26 RX ADMIN — Medication 1: at 08:42

## 2022-07-26 RX ADMIN — TIOTROPIUM BROMIDE 1 CAPSULE(S): 18 CAPSULE ORAL; RESPIRATORY (INHALATION) at 12:12

## 2022-07-26 RX ADMIN — Medication 6 MILLIGRAM(S): at 08:40

## 2022-07-26 RX ADMIN — Medication 2: at 12:12

## 2022-07-26 RX ADMIN — Medication 650 MILLIGRAM(S): at 06:36

## 2022-07-26 RX ADMIN — ENOXAPARIN SODIUM 40 MILLIGRAM(S): 100 INJECTION SUBCUTANEOUS at 08:41

## 2022-07-26 RX ADMIN — REMDESIVIR 500 MILLIGRAM(S): 5 INJECTION INTRAVENOUS at 06:35

## 2022-07-26 RX ADMIN — Medication 1: at 16:58

## 2022-07-26 RX ADMIN — Medication 81 MILLIGRAM(S): at 12:12

## 2022-07-26 NOTE — H&P ADULT - ASSESSMENT
61M w/ PMH DM, COPD pw SOB, hot / cold sweats, cough, h/a x 2days, worsening since yesterday. Workup with Covid + with underlying COPD

## 2022-07-26 NOTE — H&P ADULT - NSHPLABSRESULTS_GEN_ALL_CORE
Vitals:  ============  T(F): 97.4 (26 Jul 2022 05:12), Max: 100.4 (25 Jul 2022 21:37)  HR: 94 (26 Jul 2022 05:12)  BP: 107/53 (26 Jul 2022 05:12)  RR: 18 (26 Jul 2022 05:12)  SpO2: 92% (26 Jul 2022 05:12) (86% - 94%)  temp max in last 48H T(F): , Max: 100.4 (07-25-22 @ 21:37)    =======================================================  Current Antibiotics:  remdesivir  IVPB   IV Intermittent   remdesivir  IVPB 200 milliGRAM(s) IV Intermittent every 24 hours    Other medications:  aspirin  chewable 81 milliGRAM(s) Oral daily  dexAMETHasone     Tablet 6 milliGRAM(s) Oral daily  dextrose 5%. 1000 milliLiter(s) IV Continuous <Continuous>  dextrose 5%. 1000 milliLiter(s) IV Continuous <Continuous>  dextrose 50% Injectable 25 Gram(s) IV Push once  dextrose 50% Injectable 12.5 Gram(s) IV Push once  dextrose 50% Injectable 25 Gram(s) IV Push once  enoxaparin Injectable 40 milliGRAM(s) SubCutaneous every 24 hours  glucagon  Injectable 1 milliGRAM(s) IntraMuscular once  insulin lispro (ADMELOG) corrective regimen sliding scale   SubCutaneous three times a day before meals  tiotropium 18 MICROgram(s) Capsule 1 Capsule(s) Inhalation daily      =======================================================  Labs:                        15.1   9.20  )-----------( 176      ( 25 Jul 2022 22:00 )             46.3     07-25    141  |  105  |  12  ----------------------------<  193<H>  3.9   |  33<H>  |  0.46<L>    Ca    8.7      25 Jul 2022 22:00    TPro  6.9  /  Alb  2.6<L>  /  TBili  0.2  /  DBili  x   /  AST  11<L>  /  ALT  16  /  AlkPhos  51  07-25      Creatinine, Serum: 0.46 mg/dL (07-25-22 @ 22:00)            WBC Count: 9.20 K/uL (07-25-22 @ 22:00)    SARS-CoV-2 Result: Detected (07-25-22 @ 22:00)      Alkaline Phosphatase, Serum: 51 U/L (07-25-22 @ 22:00)  Alanine Aminotransferase (ALT/SGPT): 16 U/L (07-25-22 @ 22:00)  Aspartate Aminotransferase (AST/SGOT): 11 U/L (07-25-22 @ 22:00)  Bilirubin Total, Serum: 0.2 mg/dL (07-25-22 @ 22:00)

## 2022-07-26 NOTE — H&P ADULT - NSHPPHYSICALEXAM_GEN_ALL_CORE
VITALS:   T(C): 36.3 (07-26-22 @ 05:12), Max: 38 (07-25-22 @ 21:37)  HR: 94 (07-26-22 @ 05:12) (94 - 102)  BP: 107/53 (07-26-22 @ 05:12) (107/53 - 123/75)  RR: 18 (07-26-22 @ 05:12) (18 - 26)  SpO2: 92% (07-26-22 @ 05:12) (86% - 94%)    GENERAL: NAD, lying in bed with NC in place . Uncomfortable .   HEAD:  Atraumatic, Normocephalic  EYES: EOMI, PERRLA, conjunctiva and sclera clear  ENT: Moist mucous membranes  NECK: Supple, No JVD  CHEST/LUNG:  Decrease air entry . mild wheezing mildly tachypnic to early 20s   HEART: Regular rate and rhythm; No murmurs, rubs, or gallops  ABDOMEN: BSx4; Soft, nontender, nondistended  EXTREMITIES:  No clubbing, cyanosis, or edema  NERVOUS SYSTEM:  A&Ox3, no focal deficits   SKIN: No rashes or lesions

## 2022-07-26 NOTE — PATIENT PROFILE ADULT - FALL HARM RISK - FACTORS
Faxed Ativan RX to [facility] in [place] at [phone]. Craina Jacome Paramedic on 8/22/2019 at 2:47 PM     
Printed out Rx. For Ativan    D. Magdalene  
No indicators present

## 2022-07-26 NOTE — H&P ADULT - HISTORY OF PRESENT ILLNESS
61M w/ PMH DM, COPD pw SOB, hot / cold sweats, cough, h/a x 1 days, worsening since yesterday. Pt reports feeling unsteady / dizziness w/ walking. (+) sick contact at work. Pt reports recent admission w/ similar symptoms in June, pt reports lost d/c paperwork and has not yet f/u'd w/ outpatient Pulm. Pt uses 4 L supplemental O2 while at home, but states does not use while at work in office. When asked if he desaturates when off o2 when he goes to work and such ,he says yes but not able to give accurate assessment     In the ED pt sat mid 80s on RA. remaining vitals non significant, Labs with Cr 0.46 Albumin 2.6, pco2 56 ph 7.4 Covid +. imaging no consolidation noted.

## 2022-07-27 LAB
A1C WITH ESTIMATED AVERAGE GLUCOSE RESULT: 9.4 % — HIGH (ref 4–5.6)
ALBUMIN SERPL ELPH-MCNC: 2.5 G/DL — LOW (ref 3.3–5)
ALP SERPL-CCNC: 48 U/L — SIGNIFICANT CHANGE UP (ref 40–120)
ALT FLD-CCNC: 20 U/L — SIGNIFICANT CHANGE UP (ref 12–78)
AST SERPL-CCNC: 13 U/L — LOW (ref 15–37)
BILIRUB DIRECT SERPL-MCNC: 0.1 MG/DL — SIGNIFICANT CHANGE UP (ref 0–0.3)
BILIRUB INDIRECT FLD-MCNC: 0.2 MG/DL — SIGNIFICANT CHANGE UP (ref 0.2–1)
BILIRUB SERPL-MCNC: 0.3 MG/DL — SIGNIFICANT CHANGE UP (ref 0.2–1.2)
CREAT SERPL-MCNC: 0.38 MG/DL — LOW (ref 0.5–1.3)
EGFR: 126 ML/MIN/1.73M2 — SIGNIFICANT CHANGE UP
ESTIMATED AVERAGE GLUCOSE: 223 MG/DL — HIGH (ref 68–114)
GLUCOSE BLDC GLUCOMTR-MCNC: 115 MG/DL — HIGH (ref 70–99)
HCT VFR BLD CALC: 49.2 % — SIGNIFICANT CHANGE UP (ref 39–50)
HGB BLD-MCNC: 15.3 G/DL — SIGNIFICANT CHANGE UP (ref 13–17)
INR BLD: 1.04 RATIO — SIGNIFICANT CHANGE UP (ref 0.88–1.16)
MCHC RBC-ENTMCNC: 30.5 PG — SIGNIFICANT CHANGE UP (ref 27–34)
MCHC RBC-ENTMCNC: 31.1 G/DL — LOW (ref 32–36)
MCV RBC AUTO: 98 FL — SIGNIFICANT CHANGE UP (ref 80–100)
NRBC # BLD: 0 /100 WBCS — SIGNIFICANT CHANGE UP (ref 0–0)
PLATELET # BLD AUTO: 174 K/UL — SIGNIFICANT CHANGE UP (ref 150–400)
PROT SERPL-MCNC: 6.6 GM/DL — SIGNIFICANT CHANGE UP (ref 6–8.3)
PROTHROM AB SERPL-ACNC: 12.5 SEC — SIGNIFICANT CHANGE UP (ref 10.5–13.4)
RBC # BLD: 5.02 M/UL — SIGNIFICANT CHANGE UP (ref 4.2–5.8)
RBC # FLD: 13.2 % — SIGNIFICANT CHANGE UP (ref 10.3–14.5)
WBC # BLD: 6.97 K/UL — SIGNIFICANT CHANGE UP (ref 3.8–10.5)
WBC # FLD AUTO: 6.97 K/UL — SIGNIFICANT CHANGE UP (ref 3.8–10.5)

## 2022-07-27 PROCEDURE — 99232 SBSQ HOSP IP/OBS MODERATE 35: CPT

## 2022-07-27 RX ADMIN — ENOXAPARIN SODIUM 40 MILLIGRAM(S): 100 INJECTION SUBCUTANEOUS at 08:56

## 2022-07-27 RX ADMIN — Medication 81 MILLIGRAM(S): at 13:39

## 2022-07-27 RX ADMIN — Medication 1: at 12:58

## 2022-07-27 RX ADMIN — Medication 1: at 17:10

## 2022-07-27 RX ADMIN — Medication 6 MILLIGRAM(S): at 06:17

## 2022-07-27 RX ADMIN — REMDESIVIR 500 MILLIGRAM(S): 5 INJECTION INTRAVENOUS at 06:16

## 2022-07-27 RX ADMIN — TIOTROPIUM BROMIDE 1 CAPSULE(S): 18 CAPSULE ORAL; RESPIRATORY (INHALATION) at 13:10

## 2022-07-28 LAB
ALBUMIN SERPL ELPH-MCNC: 2.4 G/DL — LOW (ref 3.3–5)
ALBUMIN SERPL ELPH-MCNC: 2.4 G/DL — LOW (ref 3.3–5)
ALP SERPL-CCNC: 42 U/L — SIGNIFICANT CHANGE UP (ref 40–120)
ALP SERPL-CCNC: 44 U/L — SIGNIFICANT CHANGE UP (ref 40–120)
ALT FLD-CCNC: 19 U/L — SIGNIFICANT CHANGE UP (ref 12–78)
ALT FLD-CCNC: 21 U/L — SIGNIFICANT CHANGE UP (ref 12–78)
ANION GAP SERPL CALC-SCNC: 2 MMOL/L — LOW (ref 5–17)
AST SERPL-CCNC: 13 U/L — LOW (ref 15–37)
AST SERPL-CCNC: 16 U/L — SIGNIFICANT CHANGE UP (ref 15–37)
BILIRUB DIRECT SERPL-MCNC: 0.1 MG/DL — SIGNIFICANT CHANGE UP (ref 0–0.3)
BILIRUB INDIRECT FLD-MCNC: 0 MG/DL — LOW (ref 0.2–1)
BILIRUB SERPL-MCNC: 0.1 MG/DL — LOW (ref 0.2–1.2)
BILIRUB SERPL-MCNC: 0.1 MG/DL — LOW (ref 0.2–1.2)
BUN SERPL-MCNC: 18 MG/DL — SIGNIFICANT CHANGE UP (ref 7–23)
CALCIUM SERPL-MCNC: 8.6 MG/DL — SIGNIFICANT CHANGE UP (ref 8.5–10.1)
CHLORIDE SERPL-SCNC: 101 MMOL/L — SIGNIFICANT CHANGE UP (ref 96–108)
CO2 SERPL-SCNC: 38 MMOL/L — HIGH (ref 22–31)
CREAT SERPL-MCNC: 0.39 MG/DL — LOW (ref 0.5–1.3)
CREAT SERPL-MCNC: 0.44 MG/DL — LOW (ref 0.5–1.3)
CULTURE RESULTS: SIGNIFICANT CHANGE UP
EGFR: 121 ML/MIN/1.73M2 — SIGNIFICANT CHANGE UP
EGFR: 125 ML/MIN/1.73M2 — SIGNIFICANT CHANGE UP
GLUCOSE SERPL-MCNC: 132 MG/DL — HIGH (ref 70–99)
INR BLD: 1.06 RATIO — SIGNIFICANT CHANGE UP (ref 0.88–1.16)
POTASSIUM SERPL-MCNC: 4.1 MMOL/L — SIGNIFICANT CHANGE UP (ref 3.5–5.3)
POTASSIUM SERPL-SCNC: 4.1 MMOL/L — SIGNIFICANT CHANGE UP (ref 3.5–5.3)
PROT SERPL-MCNC: 6.3 GM/DL — SIGNIFICANT CHANGE UP (ref 6–8.3)
PROT SERPL-MCNC: 6.4 GM/DL — SIGNIFICANT CHANGE UP (ref 6–8.3)
PROTHROM AB SERPL-ACNC: 12.6 SEC — SIGNIFICANT CHANGE UP (ref 10.5–13.4)
SODIUM SERPL-SCNC: 141 MMOL/L — SIGNIFICANT CHANGE UP (ref 135–145)
SPECIMEN SOURCE: SIGNIFICANT CHANGE UP

## 2022-07-28 PROCEDURE — 99233 SBSQ HOSP IP/OBS HIGH 50: CPT

## 2022-07-28 RX ADMIN — Medication 81 MILLIGRAM(S): at 12:06

## 2022-07-28 RX ADMIN — ENOXAPARIN SODIUM 40 MILLIGRAM(S): 100 INJECTION SUBCUTANEOUS at 08:33

## 2022-07-28 RX ADMIN — Medication 3: at 12:06

## 2022-07-28 RX ADMIN — REMDESIVIR 500 MILLIGRAM(S): 5 INJECTION INTRAVENOUS at 06:18

## 2022-07-28 RX ADMIN — Medication 3: at 16:46

## 2022-07-28 RX ADMIN — TIOTROPIUM BROMIDE 1 CAPSULE(S): 18 CAPSULE ORAL; RESPIRATORY (INHALATION) at 12:05

## 2022-07-28 RX ADMIN — Medication 6 MILLIGRAM(S): at 06:18

## 2022-07-29 LAB
ALBUMIN SERPL ELPH-MCNC: 2.9 G/DL — LOW (ref 3.3–5)
ALP SERPL-CCNC: 49 U/L — SIGNIFICANT CHANGE UP (ref 40–120)
ALT FLD-CCNC: 25 U/L — SIGNIFICANT CHANGE UP (ref 12–78)
AST SERPL-CCNC: 19 U/L — SIGNIFICANT CHANGE UP (ref 15–37)
BILIRUB DIRECT SERPL-MCNC: 0.1 MG/DL — SIGNIFICANT CHANGE UP (ref 0–0.3)
BILIRUB INDIRECT FLD-MCNC: 0.1 MG/DL — LOW (ref 0.2–1)
BILIRUB SERPL-MCNC: 0.2 MG/DL — SIGNIFICANT CHANGE UP (ref 0.2–1.2)
CREAT SERPL-MCNC: 0.54 MG/DL — SIGNIFICANT CHANGE UP (ref 0.5–1.3)
EGFR: 113 ML/MIN/1.73M2 — SIGNIFICANT CHANGE UP
INR BLD: 1.04 RATIO — SIGNIFICANT CHANGE UP (ref 0.88–1.16)
PROT SERPL-MCNC: 7.6 GM/DL — SIGNIFICANT CHANGE UP (ref 6–8.3)
PROTHROM AB SERPL-ACNC: 12.5 SEC — SIGNIFICANT CHANGE UP (ref 10.5–13.4)

## 2022-07-29 PROCEDURE — 99233 SBSQ HOSP IP/OBS HIGH 50: CPT

## 2022-07-29 RX ADMIN — ENOXAPARIN SODIUM 40 MILLIGRAM(S): 100 INJECTION SUBCUTANEOUS at 08:35

## 2022-07-29 RX ADMIN — Medication 81 MILLIGRAM(S): at 11:55

## 2022-07-29 RX ADMIN — REMDESIVIR 500 MILLIGRAM(S): 5 INJECTION INTRAVENOUS at 06:12

## 2022-07-29 RX ADMIN — Medication 3: at 17:12

## 2022-07-29 RX ADMIN — Medication 4: at 11:54

## 2022-07-29 RX ADMIN — TIOTROPIUM BROMIDE 1 CAPSULE(S): 18 CAPSULE ORAL; RESPIRATORY (INHALATION) at 11:58

## 2022-07-29 RX ADMIN — Medication 6 MILLIGRAM(S): at 06:12

## 2022-07-29 NOTE — PROGRESS NOTE ADULT - RESPIRATORY
diminished breath sounds, L/diminished breath sounds, R
diminished breath sounds, L/diminished breath sounds, R/rales
diminished breath sounds, L/diminished breath sounds, R

## 2022-07-29 NOTE — PROGRESS NOTE ADULT - REASON FOR ADMISSION
flu-like symptoms. Covid +

## 2022-07-29 NOTE — DIETITIAN INITIAL EVALUATION ADULT - PERTINENT LABORATORY DATA
07-29    x   |  x   |  x   ----------------------------<  x   x    |  x   |  0.54    Ca    8.6      28 Jul 2022 06:15    TPro  7.6  /  Alb  2.9<L>  /  TBili  0.2  /  DBili  0.1  /  AST  19  /  ALT  25  /  AlkPhos  49  07-29  POCT Blood Glucose.: 349 mg/dL (07-29-22); 07-28 142, 265, 257, 174  A1C with Estimated Average Glucose Result: 9.4 %, 223 (07-27-22)  A1C Result: 7.2 % (05-14-22)  A1C Result: 6.9 % (05-10-22)

## 2022-07-29 NOTE — PROGRESS NOTE ADULT - CARDIOVASCULAR
normal/regular rate and rhythm/S1 S2 present/no gallops/no rub/no murmur

## 2022-07-29 NOTE — DIETITIAN INITIAL EVALUATION ADULT - OTHER INFO
Unable to conduct face-to-face interview c pt due to isolation precautions being maintained for COVID; unable to reach pt via phone.  Per assessment October 2019, pt was not receptive to education; no diet restrictions followed at home; wt appears to be stable since that time.  No reports of any N/V/C/D or chew/swallowing difficulty.  Per medical record pt takes Glybiride-Metformin x 2/day & Glyxambi x 1/day; A1c remains same as in 2019. Pt lives alone & is independent c ADL. Pt presently admitted for COVID illness c underlying respiratory failure 2/2 COPD exacerbation.

## 2022-07-29 NOTE — PROGRESS NOTE ADULT - TIME BILLING
Lab test review, Radiology Review, Vitals review, Consultant review and discussion, Physical examination, IDR, Assessment and plan; Plan discussion with patient and family
min spent reviewing chart, examining patient, discussing plan with patient and family

## 2022-07-29 NOTE — PROGRESS NOTE ADULT - SUBJECTIVE AND OBJECTIVE BOX
CHIEF COMPLAINT/INTERVAL HISTORY:    Patient is a 61y old  Male who presents with a chief complaint of flu-like symptoms. Covid + (2022 12:19)      HPI:  61M w/ PMH DM, COPD pw SOB, hot / cold sweats, cough, h/a x 1 days, worsening since yesterday. Pt reports feeling unsteady / dizziness w/ walking. (+) sick contact at work. Pt reports recent admission w/ similar symptoms in , pt reports lost d/c paperwork and has not yet f/u'd w/ outpatient Pulm. Pt uses 4 L supplemental O2 while at home, but states does not use while at work in office. When asked if he desaturates when off o2 when he goes to work and such ,he says yes but not able to give accurate assessment     In the ED pt sat mid 80s on RA. remaining vitals non significant, Labs with Cr 0.46 Albumin 2.6, pco2 56 ph 7.4 Covid +. imaging no consolidation noted.  (2022 05:38)      SUBJECTIVE & OBJECTIVE: Pt seen and examined at bedside.   + Cough  Dyspnea better  denies CP or palpitations  no other c/o     Vital Signs Last 24 Hrs  T(C): 36.6 (2022 12:25), Max: 37 (2022 15:28)  T(F): 97.8 (2022 12:25), Max: 98.6 (2022 15:28)  HR: 82 (2022 12:25) (75 - 82)  BP: 117/71 (2022 12:25) (117/71 - 139/80)  BP(mean): --  ABP: --  ABP(mean): --  RR: 18 (2022 12:25) (17 - 18)  SpO2: 93% (2022 12:25) (92% - 96%)    O2 Parameters below as of 2022 23:51  Patient On (Oxygen Delivery Method): nasal cannula  O2 Flow (L/min): 5            MEDICATIONS  (STANDING):  aspirin  chewable 81 milliGRAM(s) Oral daily  dexAMETHasone     Tablet 6 milliGRAM(s) Oral daily  dextrose 5%. 1000 milliLiter(s) (100 mL/Hr) IV Continuous <Continuous>  dextrose 5%. 1000 milliLiter(s) (50 mL/Hr) IV Continuous <Continuous>  dextrose 50% Injectable 25 Gram(s) IV Push once  dextrose 50% Injectable 12.5 Gram(s) IV Push once  dextrose 50% Injectable 25 Gram(s) IV Push once  enoxaparin Injectable 40 milliGRAM(s) SubCutaneous every 24 hours  glucagon  Injectable 1 milliGRAM(s) IntraMuscular once  insulin lispro (ADMELOG) corrective regimen sliding scale   SubCutaneous three times a day before meals  remdesivir  IVPB   IV Intermittent   remdesivir  IVPB 100 milliGRAM(s) IV Intermittent every 24 hours  tiotropium 18 MICROgram(s) Capsule 1 Capsule(s) Inhalation daily    MEDICATIONS  (PRN):  acetaminophen     Tablet .. 650 milliGRAM(s) Oral every 4 hours PRN Temp greater or equal to 38.5C (101.3F)  ALBUTerol    90 MICROgram(s) HFA Inhaler 2 Puff(s) Inhalation every 6 hours PRN Shortness of Breath and/or Wheezing  dextrose Oral Gel 15 Gram(s) Oral once PRN Blood Glucose LESS THAN 70 milliGRAM(s)/deciliter        PHYSICAL EXAM:    GENERAL: NAD,  well-developed  HEAD:  Atraumatic, Normocephalic  EYES: EOMI, PERRLA, conjunctiva and sclera clear  ENMT: Moist mucous membranes  NECK: Supple,  NERVOUS SYSTEM:  Alert & Oriented X3,non focal  CHEST/LUNG: bilateral rhonchi +  HEART: S1, S2  ABDOMEN: Soft, Nontender, Bowel sounds present  EXTREMITIES: no cyanosis, or edema    LABS:                        15.3   6.97  )-----------( 174      ( 2022 07:00 )             49.2     07-27    x   |  x   |  x   ----------------------------<  x   x    |  x   |  0.38<L>    Ca    8.7      2022 22:00    TPro  6.6  /  Alb  2.5<L>  /  TBili  0.3  /  DBili  0.1  /  AST  13<L>  /  ALT  20  /  AlkPhos  48  07-27    PT/INR - ( 2022 07:00 )   PT: 12.5 sec;   INR: 1.04 ratio         PTT - ( 2022 22:00 )  PTT:29.7 sec  Urinalysis Basic - ( 2022 02:00 )    Color: Yellow / Appearance: Clear / S.015 / pH: x  Gluc: x / Ketone: Trace  / Bili: Negative / Urobili: Negative mg/dL   Blood: x / Protein: 30 mg/dL / Nitrite: Negative   Leuk Esterase: Negative / RBC: Negative /HPF / WBC 0-2   Sq Epi: x / Non Sq Epi: x / Bacteria: Occasional        CAPILLARY BLOOD GLUCOSE      POCT Blood Glucose.: 189 mg/dL (2022 12:02)  POCT Blood Glucose.: 115 mg/dL (2022 07:54)  POCT Blood Glucose.: 197 mg/dL (2022 21:55)  POCT Blood Glucose.: 188 mg/dL (2022 16:56)  
Patient is a 61y old  Male who presents with a chief complaint of flu-like symptoms. Covid + (2022 05:38)      OVERNIGHT EVENTS:  Patients seen and examined at bedside this morning. No acute events overnight.    REVIEW OF SYSTEMS: denies chest pain/SOB, diaphoresis, no F/C, cough, dizziness, headache, blurry vision, nausea, vomiting, abdominal pain. All others review of systems negative     MEDICATIONS  (STANDING):  aspirin  chewable 81 milliGRAM(s) Oral daily  dexAMETHasone     Tablet 6 milliGRAM(s) Oral daily  dextrose 5%. 1000 milliLiter(s) (100 mL/Hr) IV Continuous <Continuous>  dextrose 5%. 1000 milliLiter(s) (50 mL/Hr) IV Continuous <Continuous>  dextrose 50% Injectable 25 Gram(s) IV Push once  dextrose 50% Injectable 12.5 Gram(s) IV Push once  dextrose 50% Injectable 25 Gram(s) IV Push once  enoxaparin Injectable 40 milliGRAM(s) SubCutaneous every 24 hours  glucagon  Injectable 1 milliGRAM(s) IntraMuscular once  insulin lispro (ADMELOG) corrective regimen sliding scale   SubCutaneous three times a day before meals  remdesivir  IVPB   IV Intermittent   tiotropium 18 MICROgram(s) Capsule 1 Capsule(s) Inhalation daily    MEDICATIONS  (PRN):  acetaminophen     Tablet .. 650 milliGRAM(s) Oral every 4 hours PRN Temp greater or equal to 38.5C (101.3F)  ALBUTerol    90 MICROgram(s) HFA Inhaler 2 Puff(s) Inhalation every 6 hours PRN Shortness of Breath and/or Wheezing  dextrose Oral Gel 15 Gram(s) Oral once PRN Blood Glucose LESS THAN 70 milliGRAM(s)/deciliter      Allergies    No Known Allergies    Intolerances        T(F): 97.5 (22 @ 15:33), Max: 100.4 (22 @ 21:37)  HR: 75 (22 @ 15:33) (75 - 102)  BP: 118/62 (22 @ 15:33) (107/53 - 123/75)  RR: 18 (22 @ 15:33) (17 - 26)  SpO2: 92% (22 @ 15:33) (86% - 95%)  Wt(kg): --    PHYSICAL EXAM:  GENERAL: NAD  HEAD:  Atraumatic, Normocephalic  EYES: PERRLA, conjunctiva and sclera clear  ENMT: Moist mucous membranes  NECK: Supple, No JVD, Normal thyroid  NERVOUS SYSTEM:  Alert & Awake  CHEST/LUNG: Clear to percussion bilaterally;   HEART: Regular rate and rhythm;   ABDOMEN: Soft, Nontender, Nondistended; Bowel sounds present  EXTREMITIES:  no edema BL LE  SKIN: moist    LABS:                        15.1   9.20  )-----------( 176      ( 2022 22:00 )             46.3     07-    x   |  x   |  x   ----------------------------<  x   x    |  x   |  0.46<L>    Ca    8.7      2022 22:00    TPro  7.3  /  Alb  2.8<L>  /  TBili  0.2  /  DBili  0.1  /  AST  18  /  ALT  21  /  AlkPhos  59  07-26    PT/INR - ( 2022 09:10 )   PT: 13.4 sec;   INR: 1.12 ratio         PTT - ( 2022 22:00 )  PTT:29.7 sec  Urinalysis Basic - ( 2022 02:00 )    Color: Yellow / Appearance: Clear / S.015 / pH: x  Gluc: x / Ketone: Trace  / Bili: Negative / Urobili: Negative mg/dL   Blood: x / Protein: 30 mg/dL / Nitrite: Negative   Leuk Esterase: Negative / RBC: Negative /HPF / WBC 0-2   Sq Epi: x / Non Sq Epi: x / Bacteria: Occasional      Cultures;   CAPILLARY BLOOD GLUCOSE      POCT Blood Glucose.: 228 mg/dL (2022 12:09)  POCT Blood Glucose.: 179 mg/dL (2022 08:40)    Lipid panel:           RADIOLOGY & ADDITIONAL TESTS:    Imaging Personally Reviewed:  [x ] YES      Consultant(s) Notes Reviewed:  [x ] YES     Care Discussed with [x ] Consultants [X ] Patient [ ] Family  [x ]    [x ]  Other; RN
62 yo man is feeling better admitted for COVID pna and COPD.     Vital Signs Last 24 Hrs  T(C): 36.7 (28 Jul 2022 18:00), Max: 36.8 (28 Jul 2022 06:14)  T(F): 98.1 (28 Jul 2022 18:00), Max: 98.2 (28 Jul 2022 06:14)  HR: 70 (28 Jul 2022 18:00) (70 - 80)  BP: 125/74 (28 Jul 2022 18:00) (109/68 - 125/74)  BP(mean): --  RR: 18 (28 Jul 2022 18:00) (18 - 18)  SpO2: 92% (28 Jul 2022 18:00) (86% - 98%)    Parameters below as of 28 Jul 2022 13:46  Patient On (Oxygen Delivery Method): nasal cannula  O2 Flow (L/min): 4      MEDICATIONS  (STANDING):  aspirin  chewable 81 milliGRAM(s) Oral daily  dexAMETHasone     Tablet 6 milliGRAM(s) Oral daily  dextrose 5%. 1000 milliLiter(s) (100 mL/Hr) IV Continuous <Continuous>  dextrose 5%. 1000 milliLiter(s) (50 mL/Hr) IV Continuous <Continuous>  dextrose 50% Injectable 25 Gram(s) IV Push once  dextrose 50% Injectable 12.5 Gram(s) IV Push once  dextrose 50% Injectable 25 Gram(s) IV Push once  enoxaparin Injectable 40 milliGRAM(s) SubCutaneous every 24 hours  glucagon  Injectable 1 milliGRAM(s) IntraMuscular once  insulin lispro (ADMELOG) corrective regimen sliding scale   SubCutaneous three times a day before meals  remdesivir  IVPB   IV Intermittent   remdesivir  IVPB 100 milliGRAM(s) IV Intermittent every 24 hours  tiotropium 18 MICROgram(s) Capsule 1 Capsule(s) Inhalation daily    MEDICATIONS  (PRN):  acetaminophen     Tablet .. 650 milliGRAM(s) Oral every 4 hours PRN Temp greater or equal to 38.5C (101.3F)  ALBUTerol    90 MICROgram(s) HFA Inhaler 2 Puff(s) Inhalation every 6 hours PRN Shortness of Breath and/or Wheezing  dextrose Oral Gel 15 Gram(s) Oral once PRN Blood Glucose LESS THAN 70 milliGRAM(s)/deciliter  
Patient is a 61y old  Male who presents with a chief complaint of flu-like symptoms. Covid + (27 Jul 2022 12:59)    INTERVAL HPI/OVERNIGHT EVENTS: no events     MEDICATIONS  (STANDING):  aspirin  chewable 81 milliGRAM(s) Oral daily  dexAMETHasone     Tablet 6 milliGRAM(s) Oral daily  dextrose 5%. 1000 milliLiter(s) (100 mL/Hr) IV Continuous <Continuous>  dextrose 5%. 1000 milliLiter(s) (50 mL/Hr) IV Continuous <Continuous>  dextrose 50% Injectable 25 Gram(s) IV Push once  dextrose 50% Injectable 12.5 Gram(s) IV Push once  dextrose 50% Injectable 25 Gram(s) IV Push once  enoxaparin Injectable 40 milliGRAM(s) SubCutaneous every 24 hours  glucagon  Injectable 1 milliGRAM(s) IntraMuscular once  insulin lispro (ADMELOG) corrective regimen sliding scale   SubCutaneous three times a day before meals  remdesivir  IVPB   IV Intermittent   remdesivir  IVPB 100 milliGRAM(s) IV Intermittent every 24 hours  tiotropium 18 MICROgram(s) Capsule 1 Capsule(s) Inhalation daily    MEDICATIONS  (PRN):  acetaminophen     Tablet .. 650 milliGRAM(s) Oral every 4 hours PRN Temp greater or equal to 38.5C (101.3F)  ALBUTerol    90 MICROgram(s) HFA Inhaler 2 Puff(s) Inhalation every 6 hours PRN Shortness of Breath and/or Wheezing  dextrose Oral Gel 15 Gram(s) Oral once PRN Blood Glucose LESS THAN 70 milliGRAM(s)/deciliter    Allergies    No Known Allergies    Intolerances      REVIEW OF SYSTEMS:  All other systems reviewed and are negative    Vital Signs Last 24 Hrs  T(C): 36.7 (28 Jul 2022 11:20), Max: 36.8 (28 Jul 2022 06:14)  T(F): 98.1 (28 Jul 2022 11:20), Max: 98.2 (28 Jul 2022 06:14)  HR: 75 (28 Jul 2022 11:20) (72 - 80)  BP: 119/80 (28 Jul 2022 11:20) (109/68 - 119/80)  BP(mean): --  RR: 18 (28 Jul 2022 11:20) (18 - 18)  SpO2: 93% (28 Jul 2022 11:20) (93% - 99%)      Daily     Daily   I&O's Summary    27 Jul 2022 07:01  -  28 Jul 2022 07:00  --------------------------------------------------------  IN: 1680 mL / OUT: 400 mL / NET: 1280 mL    28 Jul 2022 07:01  -  28 Jul 2022 13:06  --------------------------------------------------------  IN: 480 mL / OUT: 0 mL / NET: 480 mL      CAPILLARY BLOOD GLUCOSE      POCT Blood Glucose.: 265 mg/dL (28 Jul 2022 11:35)  POCT Blood Glucose.: 142 mg/dL (28 Jul 2022 07:58)  POCT Blood Glucose.: 184 mg/dL (27 Jul 2022 21:29)  POCT Blood Glucose.: 191 mg/dL (27 Jul 2022 16:19)    PHYSICAL EXAM:  GENERAL: NAD,    HEAD:  Atraumatic, Normocephalic  EYES: EOMI, PERRLA, conjunctiva and sclera clear  ENMT: No tonsillar erythema, exudates, or enlargement; Moist mucous membranes, Good dentition, No lesions  NECK: Supple, No JVD, Normal thyroid  NERVOUS SYSTEM:  Alert & Oriented X3, Good concentration; Motor Strength 5/5 B/L upper and lower extremities; DTRs 2+ intact and symmetric  CHEST/LUNG: Clear to percussion bilaterally; No rales, rhonchi, wheezing, or rubs  HEART: Regular rate and rhythm; No murmurs, rubs, or gallops  ABDOMEN: Soft, Nontender, Nondistended; Bowel sounds present  EXTREMITIES:  2+ Peripheral Pulses, No clubbing, cyanosis, or edema  LYMPH: No lymphadenopathy noted  SKIN: No rashes or lesions    Labs                          15.3   6.97  )-----------( 174      ( 27 Jul 2022 07:00 )             49.2     07-28    141  |  101  |  18  ----------------------------<  132<H>  4.1   |  38<H>  |  0.44<L>    Ca    8.6      28 Jul 2022 06:15    TPro  6.4  /  Alb  2.4<L>  /  TBili  0.1<L>  /  DBili  0.1  /  AST  13<L>  /  ALT  19  /  AlkPhos  42  07-28    PT/INR - ( 28 Jul 2022 06:15 )   PT: 12.6 sec;   INR: 1.06 ratio                   Culture - Urine (collected 26 Jul 2022 02:00)  Source: Clean Catch Clean Catch (Midstream)  Final Report (28 Jul 2022 01:37):    10,000 - 49,000 CFU/mL Coag Negative Staphylococcus    "Susceptibilities not performed"    Culture - Blood (collected 25 Jul 2022 22:00)  Source: .Blood Blood-Peripheral  Preliminary Report (27 Jul 2022 10:01):    No growth to date.    Culture - Blood (collected 25 Jul 2022 22:00)  Source: .Blood Blood-Peripheral  Preliminary Report (27 Jul 2022 10:01):    No growth to date.                DVT prophylaxis: > Lovenox 40mg SQ daily  > Heparin   > SCD's
60 yo man with known DM and COPD now with acute hypoxic resp failure due to COVID    Vital Signs Last 24 Hrs  T(C): 36.6 (27 Jul 2022 05:49), Max: 37 (26 Jul 2022 15:28)  T(F): 97.9 (27 Jul 2022 05:49), Max: 98.6 (26 Jul 2022 15:28)  HR: 76 (27 Jul 2022 05:49) (75 - 79)  BP: 120/80 (27 Jul 2022 05:49) (118/62 - 139/80)  BP(mean): --  RR: 18 (27 Jul 2022 05:49) (17 - 18)  SpO2: 95% (27 Jul 2022 05:49) (92% - 96%)    Parameters below as of 26 Jul 2022 23:51  Patient On (Oxygen Delivery Method): nasal cannula  O2 Flow (L/min): 5      MEDICATIONS  (STANDING):  aspirin  chewable 81 milliGRAM(s) Oral daily  dexAMETHasone     Tablet 6 milliGRAM(s) Oral daily  dextrose 5%. 1000 milliLiter(s) (100 mL/Hr) IV Continuous <Continuous>  dextrose 5%. 1000 milliLiter(s) (50 mL/Hr) IV Continuous <Continuous>  dextrose 50% Injectable 25 Gram(s) IV Push once  dextrose 50% Injectable 12.5 Gram(s) IV Push once  dextrose 50% Injectable 25 Gram(s) IV Push once  enoxaparin Injectable 40 milliGRAM(s) SubCutaneous every 24 hours  glucagon  Injectable 1 milliGRAM(s) IntraMuscular once  insulin lispro (ADMELOG) corrective regimen sliding scale   SubCutaneous three times a day before meals  remdesivir  IVPB   IV Intermittent   remdesivir  IVPB 100 milliGRAM(s) IV Intermittent every 24 hours  tiotropium 18 MICROgram(s) Capsule 1 Capsule(s) Inhalation daily    MEDICATIONS  (PRN):  acetaminophen     Tablet .. 650 milliGRAM(s) Oral every 4 hours PRN Temp greater or equal to 38.5C (101.3F)  ALBUTerol    90 MICROgram(s) HFA Inhaler 2 Puff(s) Inhalation every 6 hours PRN Shortness of Breath and/or Wheezing  dextrose Oral Gel 15 Gram(s) Oral once PRN Blood Glucose LESS THAN 70 milliGRAM(s)/deciliter  
Patient is a 61y old  Male who presents with a chief complaint of flu-like symptoms. Covid + (29 Jul 2022 09:00)    INTERVAL HPI/OVERNIGHT EVENTS:    MEDICATIONS  (STANDING):  aspirin  chewable 81 milliGRAM(s) Oral daily  dexAMETHasone     Tablet 6 milliGRAM(s) Oral daily  dextrose 5%. 1000 milliLiter(s) (100 mL/Hr) IV Continuous <Continuous>  dextrose 5%. 1000 milliLiter(s) (50 mL/Hr) IV Continuous <Continuous>  dextrose 50% Injectable 25 Gram(s) IV Push once  dextrose 50% Injectable 12.5 Gram(s) IV Push once  dextrose 50% Injectable 25 Gram(s) IV Push once  enoxaparin Injectable 40 milliGRAM(s) SubCutaneous every 24 hours  glucagon  Injectable 1 milliGRAM(s) IntraMuscular once  insulin lispro (ADMELOG) corrective regimen sliding scale   SubCutaneous three times a day before meals  remdesivir  IVPB   IV Intermittent   remdesivir  IVPB 100 milliGRAM(s) IV Intermittent every 24 hours  tiotropium 18 MICROgram(s) Capsule 1 Capsule(s) Inhalation daily    MEDICATIONS  (PRN):  acetaminophen     Tablet .. 650 milliGRAM(s) Oral every 4 hours PRN Temp greater or equal to 38.5C (101.3F)  ALBUTerol    90 MICROgram(s) HFA Inhaler 2 Puff(s) Inhalation every 6 hours PRN Shortness of Breath and/or Wheezing  dextrose Oral Gel 15 Gram(s) Oral once PRN Blood Glucose LESS THAN 70 milliGRAM(s)/deciliter    Allergies    No Known Allergies    Intolerances      REVIEW OF SYSTEMS:  All other systems reviewed and are negative    Vital Signs Last 24 Hrs  T(C): 36.7 (29 Jul 2022 12:18), Max: 37.2 (29 Jul 2022 05:25)  T(F): 98.1 (29 Jul 2022 12:18), Max: 98.9 (29 Jul 2022 05:25)  HR: 89 (29 Jul 2022 12:18) (70 - 89)  BP: 122/75 (29 Jul 2022 12:18) (117/70 - 125/74)  BP(mean): --  RR: 18 (29 Jul 2022 12:18) (18 - 18)  SpO2: 93% (29 Jul 2022 12:18) (86% - 99%)    Parameters below as of 29 Jul 2022 12:18  Patient On (Oxygen Delivery Method): nasal cannula      Daily     Daily   I&O's Summary    28 Jul 2022 07:01  -  29 Jul 2022 07:00  --------------------------------------------------------  IN: 880 mL / OUT: 300 mL / NET: 580 mL      CAPILLARY BLOOD GLUCOSE      POCT Blood Glucose.: 349 mg/dL (29 Jul 2022 11:10)  POCT Blood Glucose.: 126 mg/dL (29 Jul 2022 07:59)  POCT Blood Glucose.: 174 mg/dL (28 Jul 2022 22:25)  POCT Blood Glucose.: 257 mg/dL (28 Jul 2022 16:03)    PHYSICAL EXAM:  GENERAL: NAD,    HEAD:  Atraumatic, Normocephalic  EYES: EOMI, PERRLA, conjunctiva and sclera clear  ENMT: No tonsillar erythema, exudates, or enlargement; Moist mucous membranes, Good dentition, No lesions  NECK: Supple, No JVD, Normal thyroid  NERVOUS SYSTEM:  Alert & Oriented X3, Good concentration; Motor Strength 5/5 B/L upper and lower extremities; DTRs 2+ intact and symmetric  CHEST/LUNG: Clear to percussion bilaterally; No rales, rhonchi, wheezing, or rubs  HEART: Regular rate and rhythm; No murmurs, rubs, or gallops  ABDOMEN: Soft, Nontender, Nondistended; Bowel sounds present  EXTREMITIES:  2+ Peripheral Pulses, No clubbing, cyanosis, or edema  LYMPH: No lymphadenopathy noted  SKIN: No rashes or lesions    Labs      07-29    x   |  x   |  x   ----------------------------<  x   x    |  x   |  0.54    Ca    8.6      28 Jul 2022 06:15    TPro  7.6  /  Alb  2.9<L>  /  TBili  0.2  /  DBili  0.1  /  AST  19  /  ALT  25  /  AlkPhos  49  07-29    PT/INR - ( 29 Jul 2022 07:05 )   PT: 12.5 sec;   INR: 1.04 ratio                             DVT prophylaxis: > Lovenox 40mg SQ daily  > Heparin   > SCD's
62 yo man improving slowly    Vital Signs Last 24 Hrs  T(C): 37.2 (29 Jul 2022 05:25), Max: 37.2 (29 Jul 2022 05:25)  T(F): 98.9 (29 Jul 2022 05:25), Max: 98.9 (29 Jul 2022 05:25)  HR: 72 (29 Jul 2022 05:25) (70 - 75)  BP: 123/74 (29 Jul 2022 05:25) (117/70 - 125/74)  BP(mean): --  RR: 18 (29 Jul 2022 05:25) (18 - 18)  SpO2: 97% (29 Jul 2022 05:25) (86% - 99%)    Parameters below as of 29 Jul 2022 05:25  Patient On (Oxygen Delivery Method): nasal cannula  O2 Flow (L/min): 4    MEDICATIONS  (STANDING):  aspirin  chewable 81 milliGRAM(s) Oral daily  dexAMETHasone     Tablet 6 milliGRAM(s) Oral daily  dextrose 5%. 1000 milliLiter(s) (100 mL/Hr) IV Continuous <Continuous>  dextrose 5%. 1000 milliLiter(s) (50 mL/Hr) IV Continuous <Continuous>  dextrose 50% Injectable 25 Gram(s) IV Push once  dextrose 50% Injectable 12.5 Gram(s) IV Push once  dextrose 50% Injectable 25 Gram(s) IV Push once  enoxaparin Injectable 40 milliGRAM(s) SubCutaneous every 24 hours  glucagon  Injectable 1 milliGRAM(s) IntraMuscular once  insulin lispro (ADMELOG) corrective regimen sliding scale   SubCutaneous three times a day before meals  remdesivir  IVPB   IV Intermittent   remdesivir  IVPB 100 milliGRAM(s) IV Intermittent every 24 hours  tiotropium 18 MICROgram(s) Capsule 1 Capsule(s) Inhalation daily    MEDICATIONS  (PRN):  acetaminophen     Tablet .. 650 milliGRAM(s) Oral every 4 hours PRN Temp greater or equal to 38.5C (101.3F)  ALBUTerol    90 MICROgram(s) HFA Inhaler 2 Puff(s) Inhalation every 6 hours PRN Shortness of Breath and/or Wheezing  dextrose Oral Gel 15 Gram(s) Oral once PRN Blood Glucose LESS THAN 70 milliGRAM(s)/deciliter

## 2022-07-29 NOTE — PROGRESS NOTE ADULT - ASSESSMENT
62 y/o M w/ PMH of DM2, COPD on homeO2 4lpm pw SOB, hot / cold sweats, cough, h/a x 2days, worsening since yesterday found to have Covid    acute on chronic hypoxic resp failure, 2019 novel coronavirus disease (COVID-19).   IV remdesivir 3/5  decadron 6 mg daily  O2 via NC- curently on 5 L- baseline on 4 LNC  will wean down O2 as tolerated      acute exacerbation of Chronic obstructive pulmonary disease exaggerated by Covid infection   - albuterol, spiriva, steroids  -pulm following    DM 2 (diabetes mellitus). - Insulin sliding scale. Monitor FSBS    SQ lovenox for dvt ppx   
60 y/o M w/ PMH of DM2, COPD on homeO2 4lpm pw SOB, hot / cold sweats, cough, h/a x 2days, worsening since yesterday found to have Covid    acute on chronic hypoxic resp failure, 2019 novel coronavirus disease (COVID-19).   IV remdesivir 4/5  decadron 6 mg daily  O2 via NC- curently on 5 L- baseline on 4 LNC  will wean down O2 as tolerated      acute exacerbation of Chronic obstructive pulmonary disease exaggerated by Covid infection   - albuterol, spiriva, steroids  -pulm following    DM 2 (diabetes mellitus). - Insulin sliding scale. Monitor FSBS    SQ lovenox for dvt ppx   
62 y/o M w/ PMH of DM2, COPD on homeO2 4lpm pw SOB, hot / cold sweats, cough, h/a x 2days, worsening since yesterday found to have Covid    acute on chronic hypoxic resp failure, 2019 novel coronavirus disease (COVID-19).   IV remdesivir 2/5  decadron 6 mg daily  O2 via NC- curently on 5 L- baseline on 4 LNC  will wean down O2 as tolerated      acute exacerbation of Chronic obstructive pulmonary disease exaggerated by Covid infection   - albuterol, spiriva, steroids  -pulm following    DM 2 (diabetes mellitus). - Insulin sliding scale. Monitor FSBS    SQ lovenox for dvt ppx   
61M w/ PMH DM, COPD on home o2 4L pw SOB, hot / cold sweats, cough, h/a x 2days, worsening since yesterday. Workup with Covid + with underlying COPD     acute on chronic hypoxic resp failure, 2019 novel coronavirus disease (COVID-19).   - Decadron   - Remdesivir  - Covid labs  - Supplemental o2.    acute exacerbation of Chronic obstructive pulmonary disease  - Possible COPD also component of pts SOB  - Albuterol  - Spiriva  - Pt getting decadron already.    DM 2 (diabetes mellitus). - Insulin sliding scale.  
60 yo man is stable on low flow Oxygen
60 yo man improving from COVID. Underlying COPD is stable.
60 yo man with COVID pneu and hypoxic resp failure. Cont Oxygen and antivirals

## 2022-07-30 VITALS
DIASTOLIC BLOOD PRESSURE: 71 MMHG | TEMPERATURE: 98 F | SYSTOLIC BLOOD PRESSURE: 121 MMHG | RESPIRATION RATE: 18 BRPM | HEART RATE: 89 BPM | OXYGEN SATURATION: 95 %

## 2022-07-30 LAB
ALBUMIN SERPL ELPH-MCNC: 2.8 G/DL — LOW (ref 3.3–5)
ALP SERPL-CCNC: 44 U/L — SIGNIFICANT CHANGE UP (ref 40–120)
ALT FLD-CCNC: 26 U/L — SIGNIFICANT CHANGE UP (ref 12–78)
AST SERPL-CCNC: 18 U/L — SIGNIFICANT CHANGE UP (ref 15–37)
BILIRUB DIRECT SERPL-MCNC: 0.1 MG/DL — SIGNIFICANT CHANGE UP (ref 0–0.3)
BILIRUB INDIRECT FLD-MCNC: 0.1 MG/DL — LOW (ref 0.2–1)
BILIRUB SERPL-MCNC: 0.2 MG/DL — SIGNIFICANT CHANGE UP (ref 0.2–1.2)
CREAT SERPL-MCNC: 0.49 MG/DL — LOW (ref 0.5–1.3)
EGFR: 117 ML/MIN/1.73M2 — SIGNIFICANT CHANGE UP
INR BLD: 1.07 RATIO — SIGNIFICANT CHANGE UP (ref 0.88–1.16)
PROT SERPL-MCNC: 7 GM/DL — SIGNIFICANT CHANGE UP (ref 6–8.3)
PROTHROM AB SERPL-ACNC: 12.7 SEC — SIGNIFICANT CHANGE UP (ref 10.5–13.4)

## 2022-07-30 PROCEDURE — 99239 HOSP IP/OBS DSCHRG MGMT >30: CPT

## 2022-07-30 RX ADMIN — Medication 2: at 08:45

## 2022-07-30 RX ADMIN — Medication 81 MILLIGRAM(S): at 13:18

## 2022-07-30 RX ADMIN — TIOTROPIUM BROMIDE 1 CAPSULE(S): 18 CAPSULE ORAL; RESPIRATORY (INHALATION) at 13:20

## 2022-07-30 RX ADMIN — Medication 2: at 17:22

## 2022-07-30 RX ADMIN — Medication 4: at 12:14

## 2022-07-30 RX ADMIN — Medication 6 MILLIGRAM(S): at 05:59

## 2022-07-30 RX ADMIN — ENOXAPARIN SODIUM 40 MILLIGRAM(S): 100 INJECTION SUBCUTANEOUS at 08:55

## 2022-07-30 RX ADMIN — REMDESIVIR 500 MILLIGRAM(S): 5 INJECTION INTRAVENOUS at 06:00

## 2022-07-30 NOTE — DISCHARGE NOTE PROVIDER - NSDCCPCAREPLAN_GEN_ALL_CORE_FT
PRINCIPAL DISCHARGE DIAGNOSIS  Diagnosis: COVID  Assessment and Plan of Treatment: follow up with your doctor  continue your home oxygen

## 2022-07-30 NOTE — DISCHARGE NOTE PROVIDER - NSDCMRMEDTOKEN_GEN_ALL_CORE_FT
albuterol 90 mcg/inh inhalation aerosol: 2 puff(s) inhaled every 6 hours, As Needed -for bronchospasm - for shortness of breath and/or wheezing   aspirin 81 mg oral tablet, chewable: 1 tab(s) orally once a day  fluticasone 50 mcg/inh nasal spray: 1 spray(s) nasal 2 times a day  glyburide-metformin 5 mg-500 mg oral tablet: 1 tab(s) orally 2 times a day  Glyxambi 10 mg-5 mg oral tablet: 1 tab(s) orally once a day (in the morning)  Spiriva HandiHaler 18 mcg inhalation capsule: 1 cap(s) inhaled once a day   Symbicort 160 mcg-4.5 mcg/inh inhalation aerosol: 1 puff(s) inhaled 2 times a day

## 2022-07-30 NOTE — DISCHARGE NOTE PROVIDER - HOSPITAL COURSE
60 y/o M w/ PMH of DM2, COPD on homeO2 4lpm pw SOB, hot / cold sweats, cough, h/a x 2days, worsening since yesterday found to have Covid    acute on chronic hypoxic resp failure, 2019 novel coronavirus disease (COVID-19).   completed remdesivir  stable for dc  has home o2  at baseline      acute exacerbation of Chronic obstructive pulmonary disease exaggerated by Covid infection   - albuterol, spiriva, steroids  -pulm following    DM 2 (diabetes mellitus). - home regment     pt seen and examined 45 min spent on dc planning     Lab test review, Radiology Review, Vitals review, Consultant review and discussion, Physical examination, IDR, Assessment and plan; Plan discussion with patient and family

## 2022-07-30 NOTE — DISCHARGE NOTE PROVIDER - CARE PROVIDER_API CALL
Collins Paul (MD)  Medicine  Critical Care  [address] [place] [address]  Phone: [phone]  Fax: [phone]  Follow Up Time:

## 2022-07-30 NOTE — CHART NOTE - NSCHARTNOTEFT_GEN_A_CORE
Osei Choco is admitted at [facility] from 7/25/22
Notified by RN that O2 sat is 87% RA.  Plan was for patient to be discharged today however patient is refusing transportation home via ambulance/ ambulate with oxygen and demands to drive himself home without oxygen.  D/w Dr Joya and will send patient home AMA.

## 2022-08-08 DIAGNOSIS — U07.1 COVID-19: ICD-10-CM

## 2022-08-08 DIAGNOSIS — Z79.82 LONG TERM (CURRENT) USE OF ASPIRIN: ICD-10-CM

## 2022-08-08 DIAGNOSIS — J44.1 CHRONIC OBSTRUCTIVE PULMONARY DISEASE WITH (ACUTE) EXACERBATION: ICD-10-CM

## 2022-08-08 DIAGNOSIS — J96.21 ACUTE AND CHRONIC RESPIRATORY FAILURE WITH HYPOXIA: ICD-10-CM

## 2022-08-08 DIAGNOSIS — E11.9 TYPE 2 DIABETES MELLITUS WITHOUT COMPLICATIONS: ICD-10-CM

## 2023-07-11 NOTE — PATIENT PROFILE ADULT - OVER THE PAST TWO WEEKS, HAVE YOU FELT LITTLE INTEREST OR PLEASURE IN DOING THINGS?
2045- HS assessment completed. Pts vitals stable on RA. Pts blood glucose 235 per his home monitor (approved per orders). Lantus given. Pt home supply of fast acting insulin used. Pt denies pain at this time. Plan of care discussed. PT denies any needs at this time. Call light in reach. Team to continue to monitor.  Electronically signed by Feliciano Li RN on 7/11/2023 at 12:58 AM no

## 2023-07-16 ENCOUNTER — INPATIENT (INPATIENT)
Facility: HOSPITAL | Age: 63
LOS: 2 days | Discharge: HOME | End: 2023-07-19
Attending: INTERNAL MEDICINE | Admitting: INTERNAL MEDICINE
Payer: COMMERCIAL

## 2023-07-16 VITALS
SYSTOLIC BLOOD PRESSURE: 134 MMHG | OXYGEN SATURATION: 74 % | DIASTOLIC BLOOD PRESSURE: 85 MMHG | TEMPERATURE: 98 F | HEART RATE: 89 BPM | HEIGHT: 68 IN | WEIGHT: 179.02 LBS | RESPIRATION RATE: 24 BRPM

## 2023-07-16 DIAGNOSIS — J44.1 CHRONIC OBSTRUCTIVE PULMONARY DISEASE WITH (ACUTE) EXACERBATION: ICD-10-CM

## 2023-07-16 DIAGNOSIS — J96.02 ACUTE RESPIRATORY FAILURE WITH HYPERCAPNIA: ICD-10-CM

## 2023-07-16 DIAGNOSIS — E11.9 TYPE 2 DIABETES MELLITUS WITHOUT COMPLICATIONS: ICD-10-CM

## 2023-07-16 DIAGNOSIS — Z98.890 OTHER SPECIFIED POSTPROCEDURAL STATES: Chronic | ICD-10-CM

## 2023-07-16 DIAGNOSIS — I10 ESSENTIAL (PRIMARY) HYPERTENSION: ICD-10-CM

## 2023-07-16 LAB
ALBUMIN SERPL ELPH-MCNC: 3.1 G/DL — LOW (ref 3.3–5)
ALP SERPL-CCNC: 53 U/L — SIGNIFICANT CHANGE UP (ref 40–120)
ALT FLD-CCNC: 27 U/L — SIGNIFICANT CHANGE UP (ref 12–78)
ANION GAP SERPL CALC-SCNC: 2 MMOL/L — LOW (ref 5–17)
APPEARANCE UR: CLEAR — SIGNIFICANT CHANGE UP
AST SERPL-CCNC: 14 U/L — LOW (ref 15–37)
BACTERIA # UR AUTO: ABNORMAL
BASE EXCESS BLDA CALC-SCNC: 10.1 MMOL/L — HIGH (ref -2–3)
BASE EXCESS BLDA CALC-SCNC: 8.3 MMOL/L — HIGH (ref -2–3)
BASE EXCESS BLDV CALC-SCNC: 10 MMOL/L — HIGH (ref -2–3)
BASOPHILS # BLD AUTO: 0.03 K/UL — SIGNIFICANT CHANGE UP (ref 0–0.2)
BASOPHILS NFR BLD AUTO: 0.4 % — SIGNIFICANT CHANGE UP (ref 0–2)
BILIRUB SERPL-MCNC: 0.4 MG/DL — SIGNIFICANT CHANGE UP (ref 0.2–1.2)
BILIRUB UR-MCNC: NEGATIVE — SIGNIFICANT CHANGE UP
BLOOD GAS COMMENTS ARTERIAL: SIGNIFICANT CHANGE UP
BLOOD GAS COMMENTS ARTERIAL: SIGNIFICANT CHANGE UP
BLOOD GAS COMMENTS, VENOUS: SIGNIFICANT CHANGE UP
BUN SERPL-MCNC: 15 MG/DL — SIGNIFICANT CHANGE UP (ref 7–23)
CALCIUM SERPL-MCNC: 8.3 MG/DL — LOW (ref 8.5–10.1)
CHLORIDE SERPL-SCNC: 104 MMOL/L — SIGNIFICANT CHANGE UP (ref 96–108)
CO2 BLDA-SCNC: 40 MMOL/L — HIGH (ref 19–24)
CO2 BLDA-SCNC: 40 MMOL/L — HIGH (ref 19–24)
CO2 BLDV-SCNC: 44 MMOL/L — HIGH (ref 22–26)
CO2 SERPL-SCNC: 34 MMOL/L — HIGH (ref 22–31)
COLOR SPEC: YELLOW — SIGNIFICANT CHANGE UP
CREAT SERPL-MCNC: 0.56 MG/DL — SIGNIFICANT CHANGE UP (ref 0.5–1.3)
D DIMER BLD IA.RAPID-MCNC: 230 NG/ML DDU — HIGH
DIFF PNL FLD: NEGATIVE — SIGNIFICANT CHANGE UP
EGFR: 111 ML/MIN/1.73M2 — SIGNIFICANT CHANGE UP
EOSINOPHIL # BLD AUTO: 0.07 K/UL — SIGNIFICANT CHANGE UP (ref 0–0.5)
EOSINOPHIL NFR BLD AUTO: 0.8 % — SIGNIFICANT CHANGE UP (ref 0–6)
EPI CELLS # UR: SIGNIFICANT CHANGE UP
GAS PNL BLDA: SIGNIFICANT CHANGE UP
GAS PNL BLDA: SIGNIFICANT CHANGE UP
GAS PNL BLDV: SIGNIFICANT CHANGE UP
GLUCOSE BLDC GLUCOMTR-MCNC: 165 MG/DL — HIGH (ref 70–99)
GLUCOSE BLDC GLUCOMTR-MCNC: 215 MG/DL — HIGH (ref 70–99)
GLUCOSE SERPL-MCNC: 129 MG/DL — HIGH (ref 70–99)
GLUCOSE UR QL: 1000 MG/DL
GRAN CASTS # UR COMP ASSIST: ABNORMAL /LPF
HCO3 BLDA-SCNC: 38 MMOL/L — HIGH (ref 21–28)
HCO3 BLDA-SCNC: 38 MMOL/L — HIGH (ref 21–28)
HCO3 BLDV-SCNC: 41 MMOL/L — HIGH (ref 22–28)
HCT VFR BLD CALC: 61.8 % — CRITICAL HIGH (ref 39–50)
HGB BLD-MCNC: 18.3 G/DL — HIGH (ref 13–17)
HOROWITZ INDEX BLDA+IHG-RTO: 45 — SIGNIFICANT CHANGE UP
HOROWITZ INDEX BLDA+IHG-RTO: 60 — SIGNIFICANT CHANGE UP
HOROWITZ INDEX BLDV+IHG-RTO: 30 — SIGNIFICANT CHANGE UP
HYALINE CASTS # UR AUTO: ABNORMAL /LPF
IMM GRANULOCYTES NFR BLD AUTO: 0.5 % — SIGNIFICANT CHANGE UP (ref 0–0.9)
KETONES UR-MCNC: ABNORMAL
LEUKOCYTE ESTERASE UR-ACNC: NEGATIVE — SIGNIFICANT CHANGE UP
LYMPHOCYTES # BLD AUTO: 1.16 K/UL — SIGNIFICANT CHANGE UP (ref 1–3.3)
LYMPHOCYTES # BLD AUTO: 13.6 % — SIGNIFICANT CHANGE UP (ref 13–44)
MAGNESIUM SERPL-MCNC: 2 MG/DL — SIGNIFICANT CHANGE UP (ref 1.6–2.6)
MCHC RBC-ENTMCNC: 29.6 G/DL — LOW (ref 32–36)
MCHC RBC-ENTMCNC: 31.4 PG — SIGNIFICANT CHANGE UP (ref 27–34)
MCV RBC AUTO: 106.2 FL — HIGH (ref 80–100)
MONOCYTES # BLD AUTO: 0.71 K/UL — SIGNIFICANT CHANGE UP (ref 0–0.9)
MONOCYTES NFR BLD AUTO: 8.3 % — SIGNIFICANT CHANGE UP (ref 2–14)
NEUTROPHILS # BLD AUTO: 6.52 K/UL — SIGNIFICANT CHANGE UP (ref 1.8–7.4)
NEUTROPHILS NFR BLD AUTO: 76.4 % — SIGNIFICANT CHANGE UP (ref 43–77)
NITRITE UR-MCNC: NEGATIVE — SIGNIFICANT CHANGE UP
NRBC # BLD: 0 /100 WBCS — SIGNIFICANT CHANGE UP (ref 0–0)
NT-PROBNP SERPL-SCNC: 844 PG/ML — HIGH (ref 0–125)
PCO2 BLDA: 68 MMHG — HIGH (ref 32–46)
PCO2 BLDA: 79 MMHG — CRITICAL HIGH (ref 32–46)
PCO2 BLDV: 94 MMHG — HIGH (ref 42–55)
PH BLDA: 7.29 — LOW (ref 7.35–7.45)
PH BLDA: 7.36 — SIGNIFICANT CHANGE UP (ref 7.35–7.45)
PH BLDV: 7.25 — LOW (ref 7.32–7.43)
PH UR: 5 — SIGNIFICANT CHANGE UP (ref 5–8)
PLATELET # BLD AUTO: 158 K/UL — SIGNIFICANT CHANGE UP (ref 150–400)
PO2 BLDA: 113 MMHG — HIGH (ref 83–108)
PO2 BLDA: 114 MMHG — HIGH (ref 83–108)
PO2 BLDV: 28 MMHG — SIGNIFICANT CHANGE UP (ref 25–45)
POTASSIUM SERPL-MCNC: 4.4 MMOL/L — SIGNIFICANT CHANGE UP (ref 3.5–5.3)
POTASSIUM SERPL-SCNC: 4.4 MMOL/L — SIGNIFICANT CHANGE UP (ref 3.5–5.3)
PROT SERPL-MCNC: 7.4 GM/DL — SIGNIFICANT CHANGE UP (ref 6–8.3)
PROT UR-MCNC: 100 MG/DL
RAPID RVP RESULT: SIGNIFICANT CHANGE UP
RBC # BLD: 5.82 M/UL — HIGH (ref 4.2–5.8)
RBC # FLD: 13.7 % — SIGNIFICANT CHANGE UP (ref 10.3–14.5)
RBC CASTS # UR COMP ASSIST: SIGNIFICANT CHANGE UP /HPF (ref 0–4)
SAO2 % BLDA: 98.4 % — HIGH (ref 94–98)
SAO2 % BLDA: 98.5 % — HIGH (ref 94–98)
SAO2 % BLDV: 52.1 % — LOW (ref 94–98)
SARS-COV-2 RNA SPEC QL NAA+PROBE: SIGNIFICANT CHANGE UP
SODIUM SERPL-SCNC: 140 MMOL/L — SIGNIFICANT CHANGE UP (ref 135–145)
SP GR SPEC: 1.01 — SIGNIFICANT CHANGE UP (ref 1.01–1.02)
TROPONIN I, HIGH SENSITIVITY RESULT: 8.3 NG/L — SIGNIFICANT CHANGE UP
UROBILINOGEN FLD QL: NEGATIVE MG/DL — SIGNIFICANT CHANGE UP
WBC # BLD: 8.53 K/UL — SIGNIFICANT CHANGE UP (ref 3.8–10.5)
WBC # FLD AUTO: 8.53 K/UL — SIGNIFICANT CHANGE UP (ref 3.8–10.5)
WBC UR QL: NEGATIVE — SIGNIFICANT CHANGE UP

## 2023-07-16 PROCEDURE — 93010 ELECTROCARDIOGRAM REPORT: CPT

## 2023-07-16 PROCEDURE — 99291 CRITICAL CARE FIRST HOUR: CPT

## 2023-07-16 PROCEDURE — 71275 CT ANGIOGRAPHY CHEST: CPT | Mod: 26,MA

## 2023-07-16 PROCEDURE — 99283 EMERGENCY DEPT VISIT LOW MDM: CPT

## 2023-07-16 PROCEDURE — 71045 X-RAY EXAM CHEST 1 VIEW: CPT | Mod: 26

## 2023-07-16 RX ORDER — IPRATROPIUM/ALBUTEROL SULFATE 18-103MCG
3 AEROSOL WITH ADAPTER (GRAM) INHALATION
Refills: 0 | Status: COMPLETED | OUTPATIENT
Start: 2023-07-16 | End: 2023-07-16

## 2023-07-16 RX ORDER — DEXTROSE 50 % IN WATER 50 %
12.5 SYRINGE (ML) INTRAVENOUS ONCE
Refills: 0 | Status: DISCONTINUED | OUTPATIENT
Start: 2023-07-16 | End: 2023-07-19

## 2023-07-16 RX ORDER — PANTOPRAZOLE SODIUM 20 MG/1
40 TABLET, DELAYED RELEASE ORAL
Refills: 0 | Status: DISCONTINUED | OUTPATIENT
Start: 2023-07-16 | End: 2023-07-19

## 2023-07-16 RX ORDER — DEXTROSE 50 % IN WATER 50 %
25 SYRINGE (ML) INTRAVENOUS ONCE
Refills: 0 | Status: DISCONTINUED | OUTPATIENT
Start: 2023-07-16 | End: 2023-07-19

## 2023-07-16 RX ORDER — AZITHROMYCIN 500 MG/1
500 TABLET, FILM COATED ORAL EVERY 24 HOURS
Refills: 0 | Status: DISCONTINUED | OUTPATIENT
Start: 2023-07-17 | End: 2023-07-16

## 2023-07-16 RX ORDER — SODIUM CHLORIDE 9 MG/ML
1000 INJECTION, SOLUTION INTRAVENOUS
Refills: 0 | Status: DISCONTINUED | OUTPATIENT
Start: 2023-07-16 | End: 2023-07-19

## 2023-07-16 RX ORDER — CALCIUM CARBONATE 500(1250)
3 TABLET ORAL EVERY 6 HOURS
Refills: 0 | Status: DISCONTINUED | OUTPATIENT
Start: 2023-07-16 | End: 2023-07-19

## 2023-07-16 RX ORDER — AZITHROMYCIN 500 MG/1
500 TABLET, FILM COATED ORAL EVERY 24 HOURS
Refills: 0 | Status: DISCONTINUED | OUTPATIENT
Start: 2023-07-17 | End: 2023-07-19

## 2023-07-16 RX ORDER — IPRATROPIUM/ALBUTEROL SULFATE 18-103MCG
3 AEROSOL WITH ADAPTER (GRAM) INHALATION EVERY 4 HOURS
Refills: 0 | Status: DISCONTINUED | OUTPATIENT
Start: 2023-07-16 | End: 2023-07-17

## 2023-07-16 RX ORDER — INSULIN LISPRO 100/ML
VIAL (ML) SUBCUTANEOUS
Refills: 0 | Status: DISCONTINUED | OUTPATIENT
Start: 2023-07-16 | End: 2023-07-19

## 2023-07-16 RX ORDER — GLUCAGON INJECTION, SOLUTION 0.5 MG/.1ML
1 INJECTION, SOLUTION SUBCUTANEOUS ONCE
Refills: 0 | Status: DISCONTINUED | OUTPATIENT
Start: 2023-07-16 | End: 2023-07-19

## 2023-07-16 RX ORDER — AZITHROMYCIN 500 MG/1
500 TABLET, FILM COATED ORAL ONCE
Refills: 0 | Status: COMPLETED | OUTPATIENT
Start: 2023-07-16 | End: 2023-07-16

## 2023-07-16 RX ORDER — SODIUM CHLORIDE 9 MG/ML
1000 INJECTION INTRAMUSCULAR; INTRAVENOUS; SUBCUTANEOUS
Refills: 0 | Status: DISCONTINUED | OUTPATIENT
Start: 2023-07-16 | End: 2023-07-18

## 2023-07-16 RX ORDER — SENNA PLUS 8.6 MG/1
2 TABLET ORAL AT BEDTIME
Refills: 0 | Status: DISCONTINUED | OUTPATIENT
Start: 2023-07-16 | End: 2023-07-19

## 2023-07-16 RX ORDER — ENOXAPARIN SODIUM 100 MG/ML
40 INJECTION SUBCUTANEOUS EVERY 24 HOURS
Refills: 0 | Status: DISCONTINUED | OUTPATIENT
Start: 2023-07-16 | End: 2023-07-19

## 2023-07-16 RX ORDER — ASPIRIN/CALCIUM CARB/MAGNESIUM 324 MG
81 TABLET ORAL DAILY
Refills: 0 | Status: DISCONTINUED | OUTPATIENT
Start: 2023-07-16 | End: 2023-07-19

## 2023-07-16 RX ORDER — DEXTROSE 50 % IN WATER 50 %
15 SYRINGE (ML) INTRAVENOUS ONCE
Refills: 0 | Status: DISCONTINUED | OUTPATIENT
Start: 2023-07-16 | End: 2023-07-19

## 2023-07-16 RX ADMIN — Medication 3 MILLILITER(S): at 17:51

## 2023-07-16 RX ADMIN — ENOXAPARIN SODIUM 40 MILLIGRAM(S): 100 INJECTION SUBCUTANEOUS at 12:15

## 2023-07-16 RX ADMIN — Medication 3 MILLILITER(S): at 12:14

## 2023-07-16 RX ADMIN — Medication 3 MILLILITER(S): at 09:21

## 2023-07-16 RX ADMIN — Medication 3 MILLILITER(S): at 09:43

## 2023-07-16 RX ADMIN — Medication 40 MILLIGRAM(S): at 21:46

## 2023-07-16 RX ADMIN — Medication 3 MILLILITER(S): at 12:18

## 2023-07-16 RX ADMIN — SODIUM CHLORIDE 70 MILLILITER(S): 9 INJECTION INTRAMUSCULAR; INTRAVENOUS; SUBCUTANEOUS at 12:21

## 2023-07-16 RX ADMIN — Medication 40 MILLIGRAM(S): at 12:15

## 2023-07-16 RX ADMIN — Medication 3 MILLILITER(S): at 06:24

## 2023-07-16 RX ADMIN — Medication 3 MILLILITER(S): at 06:25

## 2023-07-16 RX ADMIN — Medication 3 MILLILITER(S): at 06:20

## 2023-07-16 RX ADMIN — Medication 81 MILLIGRAM(S): at 12:38

## 2023-07-16 RX ADMIN — SODIUM CHLORIDE 70 MILLILITER(S): 9 INJECTION INTRAMUSCULAR; INTRAVENOUS; SUBCUTANEOUS at 19:26

## 2023-07-16 RX ADMIN — Medication 125 MILLIGRAM(S): at 06:55

## 2023-07-16 RX ADMIN — Medication 3 MILLILITER(S): at 21:41

## 2023-07-16 RX ADMIN — SODIUM CHLORIDE 70 MILLILITER(S): 9 INJECTION INTRAMUSCULAR; INTRAVENOUS; SUBCUTANEOUS at 17:52

## 2023-07-16 RX ADMIN — AZITHROMYCIN 255 MILLIGRAM(S): 500 TABLET, FILM COATED ORAL at 06:55

## 2023-07-16 NOTE — H&P ADULT - NSHPPHYSICALEXAM_GEN_ALL_CORE
PHYSICAL EXAM:  GENERAL: NAD,   HEAD:  Atraumatic, Normocephalic  EYES: EOMI, PERRLA, conjunctiva and sclera clear  ENMT: On BIPAP  NECK: Supple,   NERVOUS SYSTEM:  Alert ; Motor Strength 5/5   CHEST/LUNG: Clear bilaterally; No rales, rhonchi, wheezing, or rubs  HEART: Regular rate and rhythm; No murmurs, rubs, or gallops  ABDOMEN: Soft, Nontender, Nondistended; Bowel sounds present  EXTREMITIES:   No clubbing, cyanosis, or edema  SKIN: No rashes or lesions

## 2023-07-16 NOTE — H&P ADULT - HISTORY OF PRESENT ILLNESS
61yo, WM with h/o DM2, HTN , COPD presents with  copd exacerbation, worsening for  3 weeks, shortly after [place] [employer] [place]. Notes increased SOB for 3-4 days.   Reports  chest tightness, wheezing,  No other complaints/inciting event.  Pt. noted to be hypoxic with supplemental oxygen.  Denies  fever, headache, chest pain, abd pain, nausea, vomiting, diarrhea, rash, paresthesia, and focal weakness--all other systems reviewed are negative.
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
## 2023-07-16 NOTE — ED PROVIDER NOTE - PHYSICAL EXAMINATION
Gen: AAOx3, NAD, sitting uncomfortably in stretcher, speaking in short sentences   Head: ncat, perrla, eomi b/l  Neck: supple, no lymphadenopathy, no midline deviation  Heart: rrr, no m/r/g  Lungs: CTA b/l, no rales/ronchi/wheezes  Abd: soft, nontender, non-distended, no rebound or guarding  Ext: no clubbing/cyanosis/edema  Neuro: sensation and muscle strength intact b/l

## 2023-07-16 NOTE — H&P ADULT - NSHPLABSRESULTS_GEN_ALL_CORE
LABS:                        18.3   8.53  )-----------( 158      ( 2023 06:15 )             61.8     -    140  |  104  |  15  ----------------------------<  129<H>  4.4   |  34<H>  |  0.56    Ca    8.3<L>      2023 06:15  Mg     2.0         TPro  7.4  /  Alb  3.1<L>  /  TBili  0.4  /  DBili  x   /  AST  14<L>  /  ALT  27  /  AlkPhos  53  -      Urinalysis Basic - ( 2023 14:00 )    Color: Yellow / Appearance: Clear / S.015 / pH: x  Gluc: x / Ketone: Small  / Bili: Negative / Urobili: Negative mg/dL   Blood: x / Protein: 100 mg/dL / Nitrite: Negative   Leuk Esterase: Negative / RBC: x / WBC x   Sq Epi: x / Non Sq Epi: x / Bacteria: x      Blood Gas Profile - Arterial (23 @ 10:23)   pH, Arterial: 7.36  pCO2, Arterial: 68 mmHg  pO2, Arterial: 114 mmHg  HCO3, Arterial: 38 mmol/L  Base Excess, Arterial: 10.1 mmol/L  Oxygen Saturation, Arterial: 98.5 %  Total CO2, Arterial: 40 mmol/L  FIO2, Arterial: 45.0      D-Dimer Assay, Quantitative: 230: D-Dimer result less than 230 ng/mL DDU correlates with the absence of   Pro-Brain Natriuretic Peptide: 844 pg/mL (23 @ 06:15)   Troponin I, High Sensitivity Result: 8.3: Serial measurements of high sensitivity troponin I (hs TnI) showing rapid         < from: CT Angio Chest PE Protocol w/ IV Cont (23 @ 08:46) >    FINDINGS:    PULMONARY EMBOLISM: No pulmonary embolism..    AIRWAYS AND LUNGS: The central tracheobronchial tree is patent.    Scattered linear atelectasis/scarring.    MEDIASTINUM AND PLEURA: There are no enlarged mediastinal, hilar or   axillary lymph nodes. The visualized portion of the thyroid gland is   unremarkable. There is no pleural effusion. There is no pneumothorax.    HEART AND VESSELS: The heart is normal in size.   There are   atherosclerotic calcifications of the aorta and coronary arteries.  There   is no pericardial effusion.    UPPERABDOMEN: Images of the upper abdomen demonstrate partially imaged   diastases left anterior abdominal wall.    BONES AND SOFT TISSUES: The bones are unremarkable.  The soft tissues are   unremarkable.    IMPRESSION:  No pulmonary embolism.    --- Endof Report ---    < end of copied text >    < from: Xray Chest 1 View- PORTABLE-Urgent (23 @ 07:03) >    COMPARISON: 2022    The cardiomediastinal silhouette is normal and the luba are not enlarged.   The trachea is midline. Suboptimal degree of inspiration. There is no   focal lung consolidation or sizable pleural effusion. No significant   osseous abnormality.    IMPRESSION:    Unremarkable frontal chest x ray    --- End of Report ---      < end of copied text >

## 2023-07-16 NOTE — ED ADULT TRIAGE NOTE - CHIEF COMPLAINT QUOTE
complaining of SOB started couple of days ago, 74% on room air, redness on the face and ears noted, swelling of the left eye lid as well. previous smoker. catching breath when talking. h/o COPD, hernia repair x3, denies fever.

## 2023-07-16 NOTE — ED PROVIDER NOTE - OBJECTIVE STATEMENT
61 yo M with copd exacerbation, copd has been bad for about 3 weeks, shortly after [place] [employer] [place].  Pt. reports chest tightness, wheezing, not chest pain however.  No other complaints/inciting event.  Pt. noted to be hypoxic with supplemental oxygen.    ROS: negative for fever, headache, chest pain, abd pain, nausea, vomiting, diarrhea, rash, paresthesia, and focal weakness--all other systems reviewed are negative.   PMH: copd; Meds: albuterol prn; SH: Denies smoking/drinking/drug use

## 2023-07-16 NOTE — ED ADULT NURSE NOTE - OBJECTIVE STATEMENT
62 year old male came in complaining of SOB started couple of days ago, 74% on room air, redness on the face and ears noted, swelling of the left eye lid as well. previous smoker. catching breath when talking. h/o COPD, hernia repair x3, denies fever, placed on the monitros A&)x3, blood gas taken, Bipap placement tbd, placed on nonbreather on arrival

## 2023-07-16 NOTE — CONSULT NOTE ADULT - ASSESSMENT
Assessment: 63 yo M with hx of DM & COPD (2L NC @ home) presents to ER with a copd exacerbation. Per patient, copd has been bad for about 3 weeks, shortly after [place]. In ER patient found to be hypercapnic and placed on BIPAP. ICU consulted for hypercapnic respiratory failure.     Plan:   - Upon evaluation patient resting comfortably on NIV, pulling large TVs (400-800) and saturating 95%. Mentating A0x4   - ABG showing hypercapnia (pc02 79) but given serum Co2 34 and pH of only 7.29 patient is most likely a chronic retainer with normal C02s most likely in high 60s to low 70s. Repeat ABG drawn on NIV with much improvement. This is an acute on chronic issue.   - Can do a trial off NIV and send to Medicine service on home o2, and then order HS. f/u serial ABGs   - RVP negative, obtain sputum culture if able to expectorate   - Pulmonary consult   - Pulmonary tolieting, tova, steriod, home COPD regimen.   - Patient does not need ICU level of care at this time, please reconsult if status worsens.       *Plan of care discussed with ICU attending MD Cox

## 2023-07-16 NOTE — ED PROVIDER NOTE - PROGRESS NOTE DETAILS
Patient signed out to me pending CTA and reassessment  Patient 2nd blood gas was done as VBG with similar CO2  EPAP increased with improvement of blood gas  ICU was consulted and patient improving so cleared for floor at this time  CTA without signs of PE  Patient admitted    -Tierra Regan MD

## 2023-07-16 NOTE — PATIENT PROFILE ADULT - PACKS YRS CALCULATION
FOLLOW UP NOTE:      HPI:  Carmelo Soto is a 56 year old male who presents today for   Chief Complaint   Patient presents with   • Follow-up     Other specified transient cerebral ischemias     Symptoms: numbness    Duration of symptoms: about 12 hours.     Location: left face      Detailed description: mid January 2015. Around 230 am, Woke up with symptoms of left face numbness which progressed down left arm and left leg. Some numbness in tongue and trouble swallowing was reported. Slight headache was reported. Some left facial droop was also noted (however baseline some facial droop was also reported). Some weakness of left side was also reported. Also some confusion was reported. Admitted at [facility] and worked-up for same      Onset: acute    Progression: resolved completeley    Aggravating: stress; working nights and sleeping less. Also domestic stress,    Relieving: none      Duration symptoms lasted: 12 hours    Meds taken: ASA 81 mg daily and Plavix 75 mg daily prior   Associated symptoms    Weakness: left sided  Speech difficulty: slurring    Visual: no problems    Dizziness: yes    Headache: yes; no significant h/o headaches aside of these spells    Numbness: none    Has happened 3 other times in past; 1st in 2008, second in 2011 and another in 2013.    No history of blank stare, out of body sensations, decreased responsiveness, time loss, benji-vu like sensations, poor memory, losing directions    H/o head trauma after accident while in college and was having weird spells and black-outs. Was started on Dilantin and rash happened. No other meds started.    No personal h/o cns infections/febrile seizures    No fh of seizures.    Approx July 10th 2016, had an episode s/o TIA at work. Sudden onset of sweating was reported along with dizziness and confusion. This was followed by left facial numbness, tongue numbness along with difficult to swallow. Some drooping of left face was reported. No weakness. Lasted  approx 12 hours followed by resolution. Some headache was reported at that time; though has no significant h/o headaches.    Dr. Paige had apparently reduced ASA to 81 mg from 325 mg. Also on Plavix which is still continuing.     No difficulty falling asleep, feels rested in AM, feels tired in day and can fall asleep during day. Snores.       Interval history [facility] last visit on 1/11/17    No further stroke or TIA like symptoms.   He  Continues on  mg and plavix.  He was on Aggrenox which caused headaces.     Lipitor was giving aching of muscles and stopped.     Now back on Zocor and Fenofibrate.     Is using CPAP .   He generally gets a good night sleep now. Energy level gets better each week of using cpap.   Not as sleepy during the day.     WORK UP:    PSG with CPAP: Correction of obstructive sleep apnea using BiPAP therapy.  The patient should be supplied with a BiPAP machine with pressure set at 25/21 cm of water pressure with 3 of C-Flex and a properly fitted mask.  The patient used a Tracky full face mask, Simplus size medium, during titration studies.  The patient should be supplied with appropriate equipment and then follow up with her referring provider regarding the results of her titration studies and further monitoring of her BiPAP equipment, compliance and efficacy.    2D echo (7/2016): Aortic valve sclerosis.  Mild tricuspid valve regurgitation.  Normal left ventricular size, systolic function and wall thickness, with no regional wall motion abnormalities.  Agitated saline injection done but technically difficult to evaluate. If clinically indicated recommend transesophageal  echocardiogram    CTA head and neck: CTA neck:     1.  No evidence of significant extracranial vascular stenosis or occlusion.  2.  Stable peripherally calcified low-attenuation nodule in the  anteromedial, presumably benign.     CTA head:        1.  No evidence of hemodynamically significant intracranial  stenosis,  aneurysm, or vascular malformation.     2.  No discrete aneurysm identified in the region of the questioned  abnormality in the left cavernous internal carotid artery. This was likely  artifactual on the prior MRI.  3.  No acute intracranial pathology.    MRI Brain (7/2016)\" IMPRESSION:     1.  No acute-appearing brain abnormality. There is no area of restricted  diffusion to suggest any acute cerebrovascular accident.  2.  Mild cortical atrophy.  3.  Minimal microangiopathic changes of aging.  4.  MRA of the brain suggests a possible small saccular aneurysm emanating  from the proximal aspect of the left cavernous ICA. Further evaluation with  CT angiography may be helpful for more definitive evaluation.  5.  MRA of the neck without IV contrast is limited, but demonstrates no  significant appearing stenosis.    (images were personally reviewed)      Cardiac event monitor: Event monitor demonstrating sinus mechanism. No paroxysms of  atrial fibrillation noted with recordings. No complex  arrhythmias seen. No significant pauses.    Holter: CONCLUSION:  Holter monitoring period documents no clinically significant  arrhythmias.    VEEG: The study is non-diagnostic. None of the patient's typical  seizures are recorded precluding a direct electro-clinical  correlation. No inter-ictal epileptiform activity is seen.     EEG: This is a normal EEG performed during the awake, drowsy and sleep  states. Intermittent rhythmic slowing is seen, likely due to  drowsiness and sleep. No clear-cut epileptiform activity is seen.  Occasional suspicious sharp transients, that were not clearly  epileptiform were also seen over bitemporal regions. A long term  video EEG might help clarify the diagnosis.     Brain MRI: no acute stroke (asked to provide images)    MRA neck: no significant stenosis      Holter: Holter monitoring period documents no clinically significant  arrhythmias.     EKG: sinus rhythm    HILARY: no  clot/shunt    2D echo: Plaque noted in ascending aorta.  Aortic valve sclerosis.  Normal left ventricular size, systolic function and wall thickness, with no regional wall motion abnormalities.  recommend HILARY if clinically indicated      Lab Services on 03/10/2017   Component Date Value Ref Range Status   • Fasting Status 03/10/2017 12  hrs Final   • Sodium 03/10/2017 142  135 - 145 mmol/L Final   • Potassium 03/10/2017 4.4  3.4 - 5.1 mmol/L Final   • Chloride 03/10/2017 106  98 - 107 mmol/L Final   • Carbon Dioxide 03/10/2017 26  21 - 32 mmol/L Final   • Anion Gap 03/10/2017 14  10 - 20 mmol/L Final   • Glucose 03/10/2017 209* 65 - 99 mg/dL Final   • BUN 03/10/2017 15  6 - 20 mg/dL Final   • Creatinine 03/10/2017 0.70  0.67 - 1.17 mg/dL Final   • GFR Estimate,  03/10/2017 >90   Final   • GFR Estimate, Non  03/10/2017 >90   Final   • BUN/Creatinine Ratio 03/10/2017 21  7 - 25 Final   • CALCIUM 03/10/2017 9.2  8.4 - 10.2 mg/dL Final   • TOTAL BILIRUBIN 03/10/2017 0.6  0.2 - 1.0 mg/dL Final   • AST/SGOT 03/10/2017 15  <38 Units/L Final   • ALT/SGPT 03/10/2017 28  <79 Units/L Final   • ALK PHOSPHATASE 03/10/2017 41* 45 - 117 Units/L Final   • TOTAL PROTEIN 03/10/2017 7.6  6.4 - 8.2 g/dL Final   • Albumin 03/10/2017 3.9  3.6 - 5.1 g/dL Final   • GLOBULIN 03/10/2017 3.7  2.0 - 4.0 g/dL Final   • A/G Ratio, Serum 03/10/2017 1.1  1.0 - 2.4 Final   • FASTING STATUS 03/10/2017 12  hrs Final   • CHOLESTEROL 03/10/2017 132  <200 mg/dL Final   • CALCULATED LDL 03/10/2017 71  <130 mg/dL Final   • HDL 03/10/2017 37* >59 mg/dL Final   • TRIGLYCERIDE 03/10/2017 120  <150 mg/dL Final   • CALCULATED NON HDL 03/10/2017 95  mg/dL Final   • CHOL/HDL 03/10/2017 3.6  <4.5 Final   • Hemoglobin A1C 03/10/2017 10.2* 4.5 - 5.6 % Final   Office Visit on 02/10/2017   Component Date Value Ref Range Status   • WBC 02/10/2017 9.3  4.2 - 11.0 K/mcL Final   • RBC 02/10/2017 4.44* 4.50 - 5.90 mil/mcL Final   • HGB  02/10/2017 13.7  13.0 - 17.0 g/dL Final   • HCT 02/10/2017 40.6  39.0 - 51.0 % Final   • MCV 02/10/2017 91.4  78.0 - 100.0 fl Final   • MCH 02/10/2017 30.9  26.0 - 34.0 pg Final   • MCHC 02/10/2017 33.7  32.0 - 36.5 g/dL Final   • RDW-CV 02/10/2017 12.6  11.0 - 15.0 % Final   • PLT 02/10/2017 210  140 - 450 K/mcL Final   • DIFF TYPE 02/10/2017 AUTOMATED DIFFERENTIAL   Final   • Neutrophil 02/10/2017 71  % Final   • LYMPH 02/10/2017 18  % Final   • MONO 02/10/2017 6  % Final   • EOSIN 02/10/2017 4  % Final   • BASO 02/10/2017 1  % Final   • Absolute Neutrophil 02/10/2017 6.7  1.8 - 7.7 K/mcL Final   • Absolute Lymph 02/10/2017 1.7  1.0 - 4.0 K/mcL Final   • Absolute Mono 02/10/2017 0.5  0.3 - 0.9 K/mcL Final   • Absolute Eos 02/10/2017 0.3  0.1 - 0.5 K/mcL Final   • Absolute Baso 02/10/2017 0.1  0.0 - 0.3 K/mcL Final   • URIC ACID 02/10/2017 5.0  3.5 - 7.2 mg/dL Final   • Specimen Description 02/10/2017 SWAB WOUND TOE   Final   • Gram Smear 02/10/2017 NO POLYMORPHONUCLEAR CELLS SEEN   Final   • Gram Smear 02/10/2017 NO EPITHELIAL CELLS SEEN   Final   • Gram Smear 02/10/2017 MANY GRAM POSITIVE COCCI IN CHAINS   Final   • Gram Smear 02/10/2017 MANY GRAM NEGATIVE BACILLI   Final   • Gram Smear 02/10/2017 RARE GRAM POSITIVE COCCI IN CLUSTERS   Final   • CULTURE 02/10/2017 FEW STAPHYLOCOCCUS AUREUS*  Final   • CULTURE 02/10/2017 WITH MANY MIXED DAMON CONSISTING OF: 1 TYPE(S) OF GRAM NEGATIVE ORGANISM(S) 2 TYPE(S) OF GRAM POSITIVE ORGANISM(S)*  Final   • CULTURE 02/10/2017 Screened for Staphylococcus aureus, Group A Streptococcus, Vancomycin Resistant Enterococcus and Pseudomonas aeruginosa.   Final   • REPORT STATUS 02/10/2017 02/16/2017 FINAL   Final   • ORGANISM 02/10/2017 STAPHYLOCOCCUS AUREUS   Final   Off Premise on 01/19/2017   Component Date Value Ref Range Status   • HIV Antigen/Antibody Screen 01/19/2017 NONREACTIVE  NONREACTIVE Final   • Fasting Status 01/19/2017 12.0  hrs Final   • Sodium 01/19/2017 137  135 -  145 mmol/L Final   • Potassium 01/19/2017 4.7  3.4 - 5.1 mmol/L Final   • Chloride 01/19/2017 101  98 - 107 mmol/L Final   • Carbon Dioxide 01/19/2017 26  21 - 32 mmol/L Final   • Anion Gap 01/19/2017 15  10 - 20 mmol/L Final   • Glucose 01/19/2017 301* 65 - 99 mg/dL Final   • BUN 01/19/2017 17  10 - 20 mg/dL Final   • Creatinine 01/19/2017 0.71  0.67 - 1.17 mg/dL Final   • GFR Estimate,  01/19/2017 >90  mL/min/1.73m2 Final   • GFR Estimate, Non  01/19/2017 >90  mL/min/1.73m2 Final   • BUN/Creatinine Ratio 01/19/2017 24  7 - 25 Final   • CALCIUM 01/19/2017 8.8  8.4 - 10.2 mg/dL Final   • TOTAL BILIRUBIN 01/19/2017 0.3  0.2 - 1.0 mg/dL Final   • AST/SGOT 01/19/2017 22  <38 Units/L Final   • ALT/SGPT 01/19/2017 38  <79 Units/L Final   • ALK PHOSPHATASE 01/19/2017 39* 45 - 117 Units/L Final   • TOTAL PROTEIN 01/19/2017 7.0  6.4 - 8.2 g/dL Final   • Albumin 01/19/2017 3.7  3.6 - 5.1 g/dL Final   • GLOBULIN 01/19/2017 3.3  2.0 - 4.0 g/dL Final   • A/G Ratio, Serum 01/19/2017 1.1  1.0 - 2.4 Final   • WBC 01/19/2017 7.2  4.2 - 11.0 K/mcL Final   • RBC 01/19/2017 4.45* 4.50 - 5.90 mil/mcL Final   • HGB 01/19/2017 13.8  13.0 - 17.0 g/dL Final   • HCT 01/19/2017 41.0  39.0 - 51.0 % Final   • MCV 01/19/2017 92.1  78.0 - 100.0 fl Final   • MCH 01/19/2017 31.0  26.0 - 34.0 pg Final   • MCHC 01/19/2017 33.7  32.0 - 36.5 g/dL Final   • RDW-CV 01/19/2017 12.8  11.0 - 15.0 % Final   • PLT 01/19/2017 222  140 - 450 K/mcL Final   • DIFF TYPE 01/19/2017 AUTOMATED DIFFERENTIAL   Final   • Neutrophil 01/19/2017 59  % Final   • LYMPH 01/19/2017 28  % Final   • MONO 01/19/2017 6  % Final   • EOSIN 01/19/2017 6  % Final   • BASO 01/19/2017 1  % Final   • Absolute Neutrophil 01/19/2017 4.2  1.8 - 7.7 K/mcL Final   • Absolute Lymph 01/19/2017 2.0  1.0 - 4.0 K/mcL Final   • Absolute Mono 01/19/2017 0.5  0.3 - 0.9 K/mcL Final   • Absolute Eos 01/19/2017 0.5  0.1 - 0.5 K/mcL Final   • Absolute Baso 01/19/2017 0.1   0.0 - 0.3 K/mcL Final   • HEPATITIS A INTERPRETATION 01/19/2017 NO EVIDENCE OF ACUTE HEPATITIS A INFECTION.   Final   • HEP A AB IGM 01/19/2017 NEGATIVE  NEGATIVE Final   • HEP B Surface AG 01/19/2017 NEGATIVE  NEGATIVE Final   • HEP B CORE IGM 01/19/2017 NEGATIVE  NEGATIVE Final   • HEPATITIS B INTERPRETATION 01/19/2017 NO EVIDENCE OF ACUTE HEPATITIS B INFECTION.   Final   • HEPATITIS C ANTIBODY 01/19/2017 NEGATIVE  NEGATIVE Final   Lab Services on 12/12/2016   Component Date Value Ref Range Status   • FASTING STATUS 12/12/2016 12.0  hrs Final   • CHOLESTEROL 12/12/2016 207* 100 - 200 mg/dL Final   • CALCULATED LDL 12/12/2016 128  <130 mg/dL Final   • HDL 12/12/2016 37* >39 mg/dL Final   • TRIGLYCERIDE 12/12/2016 210* <150 mg/dL Final   • CALCULATED NON HDL 12/12/2016 170  mg/dL Final   • CHOL/HDL 12/12/2016 5.6* <4.5 Final   • Hemoglobin A1C 12/12/2016 11.0* 4.5 - 5.6 % Final   • Fasting Status 12/12/2016 12.0  hrs Final   • Sodium 12/12/2016 137  135 - 145 mmol/L Final   • Potassium 12/12/2016 4.7  3.4 - 5.1 mmol/L Final   • Chloride 12/12/2016 101  98 - 107 mmol/L Final   • Carbon Dioxide 12/12/2016 28  21 - 32 mmol/L Final   • Anion Gap 12/12/2016 13  10 - 20 mmol/L Final   • Glucose 12/12/2016 226* 65 - 99 mg/dL Final   • BUN 12/12/2016 16  10 - 20 mg/dL Final   • Creatinine 12/12/2016 0.86  0.67 - 1.17 mg/dL Final   • GFR Estimate,  12/12/2016 >90   Final   • GFR Estimate, Non  12/12/2016 >90   Final   • BUN/Creatinine Ratio 12/12/2016 19  7 - 25 Final   • CALCIUM 12/12/2016 9.0  8.4 - 10.2 mg/dL Final   • TOTAL BILIRUBIN 12/12/2016 0.6  0.2 - 1.0 mg/dL Final   • AST/SGOT 12/12/2016 20  <38 Units/L Final   • ALT/SGPT 12/12/2016 41  <79 Units/L Final   • ALK PHOSPHATASE 12/12/2016 37* 45 - 117 Units/L Final   • TOTAL PROTEIN 12/12/2016 7.5  6.4 - 8.2 g/dL Final   • Albumin 12/12/2016 3.8  3.4 - 5.0 g/dL Final   • GLOBULIN 12/12/2016 3.7  2.0 - 4.0 g/dL Final   • A/G Ratio, Serum  12/12/2016 1.0  1.0 - 2.4 Final   • WBC 12/12/2016 5.8  4.2 - 11.0 K/mcL Final   • RBC 12/12/2016 4.58  4.50 - 5.90 mil/mcL Final   • HGB 12/12/2016 14.1  13.0 - 17.0 g/dL Final   • HCT 12/12/2016 41.9  39.0 - 51.0 % Final   • MCV 12/12/2016 91.5  78.0 - 100.0 fl Final   • MCH 12/12/2016 30.8  26.0 - 34.0 pg Final   • MCHC 12/12/2016 33.7  32.0 - 36.5 g/dL Final   • RDW-CV 12/12/2016 12.8  11.0 - 15.0 % Final   • PLT 12/12/2016 198  140 - 450 K/mcL Final            Current Outpatient Prescriptions   Medication Sig   • clopidogrel (PLAVIX) 75 MG tablet Take 1 tablet by mouth daily.   • Fenofibrate (LIPOFEN) 150 MG Cap Take 150 mg by mouth daily.   • urea (CARMOL 40) 40 % Cream Apply topically 2 times daily.   • lisinopril (ZESTRIL) 10 MG tablet Take 1 tablet by mouth daily.   • Zocor 40 MG tablet Take 1 tablet by mouth daily.   • acebutolol (SECTRAL) 400 MG capsule Take 1 capsule by mouth 2 times daily.   • insulin regular 70-30 (HUMULIN 70/30) (70-30) 100 UNIT/ML injection 80 units in am and 31 units in pm   • aspirin 325 MG tablet Take 325 mg by mouth daily.   • Insulin Syringes, Disposable, U-100 1 ML Misc Use as directed   • metFORMIN (GLUCOPHAGE) 850 MG tablet Take 1 tablet by mouth 3 times daily.   • glimepiride (AMARYL) 4 MG tablet Take 1 tablet by mouth 2 times daily.   • folic acid (FOLATE) 1 MG tablet Take 1 tablet by mouth daily.   • DISPENSE 1 Device by Other route daily. Glucose Meter   • Multiple Vitamins-Minerals (CENTRUM SILVER) tablet Take 1 tablet by mouth daily.   • Omega-3 Fatty Acids (FISH OIL) 1200 MG capsule Take 1,200 mg by mouth 3 times daily.     No current facility-administered medications for this visit.        Past Medical History:   Diagnosis Date   • Anxiety    • Arterial ischemic stroke, vertebrobasilar, brainstem, remote, resolved 2008   • Arthritis    • Blood clot associated with vein wall inflammation    • CAD (coronary artery disease) 2011    has stents x 2- LAD, prox RCA   •  Cerebral infarction    • Diabetes mellitus     insulin-dependent   • Essential (primary) hypertension    • Hyperlipidemia    • Lymphoma    • Malignant neoplasm     NHL   • Pneumonia     3x   • Psoriasis    • Stroke        Past Surgical History:   Procedure Laterality Date   • CYST REMOVAL      R posterior shoulder   • KNEE ARTHROSCOPY W/ MENISCAL REPAIR      R knee   • PORTACATH PLACEMENT  -inserted    -removed   • PTCA     • PTCA WITH STENT     • SERVICE TO GASTROENTEROLOGY         Family History   Problem Relation Age of Onset   • Diabetes Mother    • Cancer Mother      breast   • Heart disease Father    • Diabetes Father    • High blood pressure Father    • Cancer Maternal Grandmother      colon   • Heart disease Maternal Grandfather       MI at 54 y/o   • Diabetes Maternal Grandmother    • Heart Maternal Grandfather    • Heart Paternal Grandmother    • Hypertension Paternal Grandmother    • Hypertension Paternal Grandmother    • Diabetes Paternal Grandmother    • Stroke Paternal Grandfather    • Vascular Mother      Parkinsons   • Heart Mother        Social History     Social History   • Marital status:      Spouse name: N/A   • Number of children: N/A   • Years of education: N/A     Occupational History   •  [employer]      Social History Main Topics   • Smoking status: Former Smoker     Start date: 1988     Quit date: 2000   • Smokeless tobacco: Never Used   • Alcohol use No      Comment: rarely   • Drug use: No   • Sexual activity: Not on file     Other Topics Concern   • Not on file     Social History Narrative               REVIEW OF SYSTEMS:  14 point ROS completed, as shown above.    PHYSICAL EXAM:  Visit Vitals   • /78   • Pulse 82   • Resp 15   • Ht 5' 10\" (1.778 m)   • Wt 117 kg   • SpO2 95%   • BMI 37.02 kg/m2      GENERAL APPEARANCE: Pleasant and cooperative, normal in appearance.   HEENT: Normal.  Skin: normal   Pscy  Normal  Extremities: normal  NEUROLOGIC: Alert and oriented to time, place, person. Memory, attention span, concentration, language and fund of knowledge were appropriate.   Cranial nerves: Extraocular movements are full. There was no facial droop. Hearing, tongue protrusion and palatal elevation within normal limits.   Motor exam: Tone and strength were normal. Coordination revealed absence of tremors. Gait: post op shoe in place otherwise  was normal.       Impression and Plan: Carmelo was seen today for follow-up.    Diagnoses and all orders for this visit:    Other specified transient cerebral ischemias: BS and BP control. Could not tolerate Aggrenox secondary to headaches. Now only on Plavix and  mg          -     clopidogrel (PLAVIX) 75 MG tablet; Take 1 tablet by mouth daily. Risks, benefits, alternatives have been explained including but not limited to bleeding. Patient has verbalized understanding.         Other hyperlipidemia: could not tolerate Lipitor secondary to myalgias. On  ZOcor 60 mg daily. continue Lipofen   LDL 71 on 3/10/17    Diabetes with poor control but slow improvement AIC at 10.    Other orders  -     simvastatin (ZOCOR) 20 MG tablet; Take 3 tablets by mouth nightly.    Risks, benefits, alternatives explained including but not limited to myalgias. Patientverbalized understanding.       MARINO (obstructive sleep apnea): wearing CPAP, which is helping with energy and daytime fatigue.     Patient should not drive. Patient verbalized understanding of above.       Follow up as needed, otherwise follow up with PCP   Seek medical attention sooner in case of any worsening/new symptoms developing.      Ron Kay MD  4/14/17                       0

## 2023-07-17 LAB
A1C WITH ESTIMATED AVERAGE GLUCOSE RESULT: 7.9 % — HIGH (ref 4–5.6)
ALBUMIN SERPL ELPH-MCNC: 2.7 G/DL — LOW (ref 3.3–5)
ALP SERPL-CCNC: 46 U/L — SIGNIFICANT CHANGE UP (ref 40–120)
ALT FLD-CCNC: 24 U/L — SIGNIFICANT CHANGE UP (ref 12–78)
ANION GAP SERPL CALC-SCNC: 3 MMOL/L — LOW (ref 5–17)
AST SERPL-CCNC: 15 U/L — SIGNIFICANT CHANGE UP (ref 15–37)
BILIRUB SERPL-MCNC: 0.3 MG/DL — SIGNIFICANT CHANGE UP (ref 0.2–1.2)
BUN SERPL-MCNC: 22 MG/DL — SIGNIFICANT CHANGE UP (ref 7–23)
CALCIUM SERPL-MCNC: 8.1 MG/DL — LOW (ref 8.5–10.1)
CHLORIDE SERPL-SCNC: 103 MMOL/L — SIGNIFICANT CHANGE UP (ref 96–108)
CO2 SERPL-SCNC: 33 MMOL/L — HIGH (ref 22–31)
CREAT SERPL-MCNC: 0.48 MG/DL — LOW (ref 0.5–1.3)
EGFR: 117 ML/MIN/1.73M2 — SIGNIFICANT CHANGE UP
ESTIMATED AVERAGE GLUCOSE: 180 MG/DL — HIGH (ref 68–114)
GLUCOSE BLDC GLUCOMTR-MCNC: 176 MG/DL — HIGH (ref 70–99)
GLUCOSE BLDC GLUCOMTR-MCNC: 189 MG/DL — HIGH (ref 70–99)
GLUCOSE BLDC GLUCOMTR-MCNC: 213 MG/DL — HIGH (ref 70–99)
GLUCOSE BLDC GLUCOMTR-MCNC: 333 MG/DL — HIGH (ref 70–99)
GLUCOSE SERPL-MCNC: 160 MG/DL — HIGH (ref 70–99)
HCT VFR BLD CALC: 55.7 % — HIGH (ref 39–50)
HGB BLD-MCNC: 16.6 G/DL — SIGNIFICANT CHANGE UP (ref 13–17)
LEGIONELLA AG UR QL: NEGATIVE — SIGNIFICANT CHANGE UP
MCHC RBC-ENTMCNC: 29.8 G/DL — LOW (ref 32–36)
MCHC RBC-ENTMCNC: 30.9 PG — SIGNIFICANT CHANGE UP (ref 27–34)
MCV RBC AUTO: 103.7 FL — HIGH (ref 80–100)
NRBC # BLD: 0 /100 WBCS — SIGNIFICANT CHANGE UP (ref 0–0)
PLATELET # BLD AUTO: 166 K/UL — SIGNIFICANT CHANGE UP (ref 150–400)
POTASSIUM SERPL-MCNC: 4.3 MMOL/L — SIGNIFICANT CHANGE UP (ref 3.5–5.3)
POTASSIUM SERPL-SCNC: 4.3 MMOL/L — SIGNIFICANT CHANGE UP (ref 3.5–5.3)
PROT SERPL-MCNC: 6.3 GM/DL — SIGNIFICANT CHANGE UP (ref 6–8.3)
RBC # BLD: 5.37 M/UL — SIGNIFICANT CHANGE UP (ref 4.2–5.8)
RBC # FLD: 13.5 % — SIGNIFICANT CHANGE UP (ref 10.3–14.5)
SODIUM SERPL-SCNC: 139 MMOL/L — SIGNIFICANT CHANGE UP (ref 135–145)
WBC # BLD: 7.83 K/UL — SIGNIFICANT CHANGE UP (ref 3.8–10.5)
WBC # FLD AUTO: 7.83 K/UL — SIGNIFICANT CHANGE UP (ref 3.8–10.5)

## 2023-07-17 RX ORDER — IPRATROPIUM/ALBUTEROL SULFATE 18-103MCG
3 AEROSOL WITH ADAPTER (GRAM) INHALATION EVERY 6 HOURS
Refills: 0 | Status: DISCONTINUED | OUTPATIENT
Start: 2023-07-17 | End: 2023-07-19

## 2023-07-17 RX ORDER — DEXAMETHASONE 0.5 MG/5ML
6 ELIXIR ORAL EVERY 12 HOURS
Refills: 0 | Status: DISCONTINUED | OUTPATIENT
Start: 2023-07-17 | End: 2023-07-19

## 2023-07-17 RX ADMIN — Medication 2: at 17:08

## 2023-07-17 RX ADMIN — Medication 40 MILLIGRAM(S): at 05:55

## 2023-07-17 RX ADMIN — Medication 3 MILLILITER(S): at 05:05

## 2023-07-17 RX ADMIN — PANTOPRAZOLE SODIUM 40 MILLIGRAM(S): 20 TABLET, DELAYED RELEASE ORAL at 05:54

## 2023-07-17 RX ADMIN — Medication 2: at 08:15

## 2023-07-17 RX ADMIN — SODIUM CHLORIDE 70 MILLILITER(S): 9 INJECTION INTRAMUSCULAR; INTRAVENOUS; SUBCUTANEOUS at 12:10

## 2023-07-17 RX ADMIN — Medication 81 MILLIGRAM(S): at 11:57

## 2023-07-17 RX ADMIN — ENOXAPARIN SODIUM 40 MILLIGRAM(S): 100 INJECTION SUBCUTANEOUS at 11:57

## 2023-07-17 RX ADMIN — AZITHROMYCIN 255 MILLIGRAM(S): 500 TABLET, FILM COATED ORAL at 12:08

## 2023-07-17 RX ADMIN — Medication 1 TABLET(S): at 11:57

## 2023-07-17 RX ADMIN — Medication 6 MILLIGRAM(S): at 17:08

## 2023-07-17 RX ADMIN — SODIUM CHLORIDE 70 MILLILITER(S): 9 INJECTION INTRAMUSCULAR; INTRAVENOUS; SUBCUTANEOUS at 03:06

## 2023-07-17 RX ADMIN — SODIUM CHLORIDE 70 MILLILITER(S): 9 INJECTION INTRAMUSCULAR; INTRAVENOUS; SUBCUTANEOUS at 21:35

## 2023-07-17 RX ADMIN — Medication 8: at 11:58

## 2023-07-17 RX ADMIN — Medication 3 MILLILITER(S): at 01:15

## 2023-07-17 NOTE — CHART NOTE - NSCHARTNOTEFT_GEN_A_CORE
Pulmonary consult called for hypercarbic hypoxic respiratory failure.    Pt admitted yesterday 7/16.    Currently on NC and off bipap since this AM.    Pt states he follows with Dr. Paul in office and has been a patient of his for many years.    Dr. Paul's office called and informed of pt's hospitalization and location for pulmonary consult.

## 2023-07-18 LAB
ANION GAP SERPL CALC-SCNC: 1 MMOL/L — LOW (ref 5–17)
BUN SERPL-MCNC: 20 MG/DL — SIGNIFICANT CHANGE UP (ref 7–23)
CALCIUM SERPL-MCNC: 8.2 MG/DL — LOW (ref 8.5–10.1)
CHLORIDE SERPL-SCNC: 100 MMOL/L — SIGNIFICANT CHANGE UP (ref 96–108)
CO2 SERPL-SCNC: 33 MMOL/L — HIGH (ref 22–31)
CREAT SERPL-MCNC: 0.57 MG/DL — SIGNIFICANT CHANGE UP (ref 0.5–1.3)
EGFR: 111 ML/MIN/1.73M2 — SIGNIFICANT CHANGE UP
GLUCOSE BLDC GLUCOMTR-MCNC: 164 MG/DL — HIGH (ref 70–99)
GLUCOSE BLDC GLUCOMTR-MCNC: 171 MG/DL — HIGH (ref 70–99)
GLUCOSE BLDC GLUCOMTR-MCNC: 185 MG/DL — HIGH (ref 70–99)
GLUCOSE BLDC GLUCOMTR-MCNC: 245 MG/DL — HIGH (ref 70–99)
GLUCOSE SERPL-MCNC: 185 MG/DL — HIGH (ref 70–99)
HCT VFR BLD CALC: 54.3 % — HIGH (ref 39–50)
HGB BLD-MCNC: 17.2 G/DL — HIGH (ref 13–17)
MCHC RBC-ENTMCNC: 31.7 G/DL — LOW (ref 32–36)
MCHC RBC-ENTMCNC: 32 PG — SIGNIFICANT CHANGE UP (ref 27–34)
MCV RBC AUTO: 100.9 FL — HIGH (ref 80–100)
NRBC # BLD: 0 /100 WBCS — SIGNIFICANT CHANGE UP (ref 0–0)
PLATELET # BLD AUTO: 162 K/UL — SIGNIFICANT CHANGE UP (ref 150–400)
POTASSIUM SERPL-MCNC: 4.9 MMOL/L — SIGNIFICANT CHANGE UP (ref 3.5–5.3)
POTASSIUM SERPL-SCNC: 4.9 MMOL/L — SIGNIFICANT CHANGE UP (ref 3.5–5.3)
RBC # BLD: 5.38 M/UL — SIGNIFICANT CHANGE UP (ref 4.2–5.8)
RBC # FLD: 13.2 % — SIGNIFICANT CHANGE UP (ref 10.3–14.5)
SODIUM SERPL-SCNC: 134 MMOL/L — LOW (ref 135–145)
WBC # BLD: 9.1 K/UL — SIGNIFICANT CHANGE UP (ref 3.8–10.5)
WBC # FLD AUTO: 9.1 K/UL — SIGNIFICANT CHANGE UP (ref 3.8–10.5)

## 2023-07-18 RX ADMIN — Medication 81 MILLIGRAM(S): at 11:42

## 2023-07-18 RX ADMIN — Medication 6 MILLIGRAM(S): at 05:21

## 2023-07-18 RX ADMIN — SODIUM CHLORIDE 70 MILLILITER(S): 9 INJECTION INTRAMUSCULAR; INTRAVENOUS; SUBCUTANEOUS at 05:22

## 2023-07-18 RX ADMIN — Medication 1 TABLET(S): at 11:43

## 2023-07-18 RX ADMIN — Medication 2: at 08:26

## 2023-07-18 RX ADMIN — Medication 2: at 17:28

## 2023-07-18 RX ADMIN — Medication 4: at 11:43

## 2023-07-18 RX ADMIN — SODIUM CHLORIDE 70 MILLILITER(S): 9 INJECTION INTRAMUSCULAR; INTRAVENOUS; SUBCUTANEOUS at 08:24

## 2023-07-18 RX ADMIN — PANTOPRAZOLE SODIUM 40 MILLIGRAM(S): 20 TABLET, DELAYED RELEASE ORAL at 05:21

## 2023-07-18 RX ADMIN — Medication 3 MILLILITER(S): at 18:48

## 2023-07-18 RX ADMIN — Medication 6 MILLIGRAM(S): at 17:33

## 2023-07-18 RX ADMIN — ENOXAPARIN SODIUM 40 MILLIGRAM(S): 100 INJECTION SUBCUTANEOUS at 11:43

## 2023-07-18 RX ADMIN — AZITHROMYCIN 255 MILLIGRAM(S): 500 TABLET, FILM COATED ORAL at 11:42

## 2023-07-18 NOTE — PHYSICAL THERAPY INITIAL EVALUATION ADULT - ADDITIONAL COMMENTS
pt lives alone in a private home with 1 flight of stairs with leonid HR. Pt states he has home 02 and bipap but no portable 02.

## 2023-07-18 NOTE — PROGRESS NOTE ADULT - TIME BILLING
Pulmonary following  PT  d/c IVF  Discharge planing  Continue current care and treatment.
Seen by Pulmonary   Continue current care and treatment.

## 2023-07-19 VITALS
OXYGEN SATURATION: 93 % | RESPIRATION RATE: 18 BRPM | DIASTOLIC BLOOD PRESSURE: 65 MMHG | TEMPERATURE: 98 F | SYSTOLIC BLOOD PRESSURE: 111 MMHG | HEART RATE: 76 BPM

## 2023-07-19 LAB
ANION GAP SERPL CALC-SCNC: 2 MMOL/L — LOW (ref 5–17)
BASE EXCESS BLDV CALC-SCNC: 7.8 MMOL/L — HIGH (ref -2–3)
BLOOD GAS COMMENTS, VENOUS: SIGNIFICANT CHANGE UP
BUN SERPL-MCNC: 23 MG/DL — SIGNIFICANT CHANGE UP (ref 7–23)
CALCIUM SERPL-MCNC: 8.1 MG/DL — LOW (ref 8.5–10.1)
CHLORIDE SERPL-SCNC: 100 MMOL/L — SIGNIFICANT CHANGE UP (ref 96–108)
CO2 BLDV-SCNC: 36 MMOL/L — HIGH (ref 22–26)
CO2 SERPL-SCNC: 35 MMOL/L — HIGH (ref 22–31)
CREAT SERPL-MCNC: 0.52 MG/DL — SIGNIFICANT CHANGE UP (ref 0.5–1.3)
EGFR: 114 ML/MIN/1.73M2 — SIGNIFICANT CHANGE UP
GAS PNL BLDV: SIGNIFICANT CHANGE UP
GLUCOSE BLDC GLUCOMTR-MCNC: 214 MG/DL — HIGH (ref 70–99)
GLUCOSE BLDC GLUCOMTR-MCNC: 274 MG/DL — HIGH (ref 70–99)
GLUCOSE SERPL-MCNC: 136 MG/DL — HIGH (ref 70–99)
HCO3 BLDV-SCNC: 34 MMOL/L — HIGH (ref 22–28)
HCT VFR BLD CALC: 54.8 % — HIGH (ref 39–50)
HGB BLD-MCNC: 17 G/DL — SIGNIFICANT CHANGE UP (ref 13–17)
MCHC RBC-ENTMCNC: 31 G/DL — LOW (ref 32–36)
MCHC RBC-ENTMCNC: 31.4 PG — SIGNIFICANT CHANGE UP (ref 27–34)
MCV RBC AUTO: 101.3 FL — HIGH (ref 80–100)
NRBC # BLD: 0 /100 WBCS — SIGNIFICANT CHANGE UP (ref 0–0)
PCO2 BLDV: 54 MMHG — SIGNIFICANT CHANGE UP (ref 42–55)
PH BLDV: 7.41 — SIGNIFICANT CHANGE UP (ref 7.32–7.43)
PLATELET # BLD AUTO: 142 K/UL — LOW (ref 150–400)
PO2 BLDV: 63 MMHG — HIGH (ref 25–45)
POTASSIUM SERPL-MCNC: 3.8 MMOL/L — SIGNIFICANT CHANGE UP (ref 3.5–5.3)
POTASSIUM SERPL-SCNC: 3.8 MMOL/L — SIGNIFICANT CHANGE UP (ref 3.5–5.3)
RBC # BLD: 5.41 M/UL — SIGNIFICANT CHANGE UP (ref 4.2–5.8)
RBC # FLD: 13.3 % — SIGNIFICANT CHANGE UP (ref 10.3–14.5)
SAO2 % BLDV: 93 % — LOW (ref 94–98)
SODIUM SERPL-SCNC: 137 MMOL/L — SIGNIFICANT CHANGE UP (ref 135–145)
WBC # BLD: 7.35 K/UL — SIGNIFICANT CHANGE UP (ref 3.8–10.5)
WBC # FLD AUTO: 7.35 K/UL — SIGNIFICANT CHANGE UP (ref 3.8–10.5)

## 2023-07-19 RX ORDER — ALBUTEROL 90 UG/1
1 AEROSOL, METERED ORAL
Qty: 1 | Refills: 3
Start: 2023-07-19

## 2023-07-19 RX ORDER — TIOTROPIUM BROMIDE 18 UG/1
1 CAPSULE ORAL; RESPIRATORY (INHALATION)
Qty: 1 | Refills: 0
Start: 2023-07-19 | End: 2023-08-17

## 2023-07-19 RX ORDER — BUDESONIDE AND FORMOTEROL FUMARATE DIHYDRATE 160; 4.5 UG/1; UG/1
1 AEROSOL RESPIRATORY (INHALATION)
Qty: 1 | Refills: 0
Start: 2023-07-19 | End: 2023-08-17

## 2023-07-19 RX ORDER — AZITHROMYCIN 500 MG/1
1 TABLET, FILM COATED ORAL
Qty: 3 | Refills: 0
Start: 2023-07-19 | End: 2023-07-21

## 2023-07-19 RX ADMIN — Medication 1 TABLET(S): at 11:14

## 2023-07-19 RX ADMIN — Medication 4: at 08:34

## 2023-07-19 RX ADMIN — Medication 81 MILLIGRAM(S): at 11:14

## 2023-07-19 RX ADMIN — Medication 6 MILLIGRAM(S): at 05:45

## 2023-07-19 RX ADMIN — Medication 6: at 11:30

## 2023-07-19 RX ADMIN — AZITHROMYCIN 255 MILLIGRAM(S): 500 TABLET, FILM COATED ORAL at 11:29

## 2023-07-19 RX ADMIN — PANTOPRAZOLE SODIUM 40 MILLIGRAM(S): 20 TABLET, DELAYED RELEASE ORAL at 05:45

## 2023-07-19 NOTE — PROGRESS NOTE ADULT - RESPIRATORY
diminished breath sounds, L/diminished breath sounds, R
normal/clear to auscultation bilaterally/no wheezes/no rales/no rhonchi/diminished breath sounds, L/diminished breath sounds, R
no rales/diminished breath sounds, L/diminished breath sounds, R

## 2023-07-19 NOTE — DISCHARGE NOTE PROVIDER - PROVIDER TOKENS
PROVIDER:[TOKEN:[[id number]]],FREE:[LAST:[Primary Care Doctor],PHONE:[(   )    -],FAX:[(   )    -],FOLLOWUP:[1 week]]

## 2023-07-19 NOTE — DISCHARGE NOTE PROVIDER - NSDCMRMEDTOKEN_GEN_ALL_CORE_FT
albuterol 90 mcg/inh inhalation aerosol: 1 puff(s) inhaled every 6 hours as needed for  bronchospasm  aspirin 81 mg oral tablet, chewable: 1 tab(s) orally once a day  azithromycin 500 mg oral tablet: 1 tab(s) orally once a day  fluticasone 50 mcg/inh nasal spray: 1 spray(s) nasal 2 times a day  glyburide-metformin 5 mg-500 mg oral tablet: 1 tab(s) orally 2 times a day  Glyxambi 10 mg-5 mg oral tablet: 1 tab(s) orally once a day (in the morning)  Multiple Vitamins oral tablet: 1 tab(s) orally once a day  predniSONE 10 mg oral tablet: 1 tab(s) orally 1 to 4 times a day Prednisone Taper:     Day 1: Please take 1 tab 4x/day   Day 2: Please take 1 tab 4x/day  Day 3: Please take 1 tab 3x/day  Day 4: Please take 1 tab 3x/day  Day 5: Please take 1 tab 2x/day   Day 6: Please take 1 tab 2x/day   Day 7: Please take 1 tab 1x/day   Day 8: Please take 1 tab 1x/day  Spiriva HandiHaler 18 mcg inhalation capsule: 1 cap(s) inhaled once a day  Symbicort 160 mcg-4.5 mcg/inh inhalation aerosol: 1 puff(s) inhaled 2 times a day

## 2023-07-19 NOTE — DISCHARGE NOTE NURSING/CASE MANAGEMENT/SOCIAL WORK - NSDCFUADDAPPT_GEN_ALL_CORE_FT
It is important to see your primary physician as well as the physicians noted below within the next week to perform a comprehensive medical review.  Call their offices for an appointment as soon as you leave the hospital.  You will also need to see them for renewal of your medications.  If you do not have a primary physician, contact the [facility] [place] Physician Referral Service at [phone].  To obtain your results, you can access the [email or web address] Patient Portal at [email or web address].  Your medical issues appear to be stable at this time, but if your symptoms recur or worsen, contact your physicians and/or return to the hospital if necessary.  If you encounter any issues or questions with your medication, call your physicians before stopping the medication.

## 2023-07-19 NOTE — PROGRESS NOTE ADULT - PROBLEM SELECTOR PROBLEM 2
COPD with exacerbation

## 2023-07-19 NOTE — PROGRESS NOTE ADULT - PROBLEM SELECTOR PROBLEM 1
Acute respiratory failure with hypercapnia

## 2023-07-19 NOTE — PROGRESS NOTE ADULT - ASSESSMENT
COPD exacerbation on steroids. Cont to taper and nebulized medication
61 yo man slowly improving. Cont to taper steroids
Admitted for Acute Hypercarbic Respiratory Failure 2/2 COPD exacerbations DM2 and HTN.
Admitted for Acute Hypercarbic Respiratory Failure 2/2 COPD exacerbations DM2 and HTN.
COPD is slowly improving. Cont to taper steroids.

## 2023-07-19 NOTE — DISCHARGE NOTE NURSING/CASE MANAGEMENT/SOCIAL WORK - NSCORESITESY/N_GEN_A_CORE_RD
Patient was scheduled with Nelson Santiaog MD for Colonoscopy  on 8.26.22 at  for Positive cologuard. COVID test scheduled on 8.24.22.  Per patient, no positive COVID test within 90 days.     Diabetic: No    Blood Thinners: No    Patient advised to contact insurance company to verify coverage.  Patient also advised they may receive call from [employer] regarding a $250 Time Of Service Payment.   Patient verbalized understanding.   Yes

## 2023-07-19 NOTE — DISCHARGE NOTE PROVIDER - NSDCCPCAREPLAN_GEN_ALL_CORE_FT
PRINCIPAL DISCHARGE DIAGNOSIS  Diagnosis: COPD exacerbation  Assessment and Plan of Treatment: Please complete the steroids and antibiotics as prescribed.

## 2023-07-19 NOTE — DISCHARGE NOTE NURSING/CASE MANAGEMENT/SOCIAL WORK - PATIENT PORTAL LINK FT
You can access the FollowMyHealth Patient Portal offered by [facility] by registering at the following website: [email or web address]. By joining [facility]’s FollowMyHealth portal, you will also be able to view your health information using other applications (apps) compatible with our system.

## 2023-07-19 NOTE — DISCHARGE NOTE PROVIDER - HOSPITAL COURSE
63yo, WM with h/o DM2, HTN , COPD presents with  copd exacerbation, worsening for  3 weeks, shortly after smoke from [place] came to [place]. In ED, pt found with leukocytosis, ABG with hypercapnea, CXR without acute process. Pt started empirically on Azithro, started on Duonebs and steroid taper. Over hospital course, pt with improved sx, returned to baseline home O2, remained afebrile and HD stable.     Problem/Plan - 1:  ·  Problem: Acute respiratory failure with hypercapnia.   ·  Plan: Pulmonary consult  O2 support and monitoring.     Problem/Plan - 2:  ·  Problem: COPD with exacerbation.   ·  Plan: IV Steroids  IVF  IV Zithro  Duoneb.     Problem/Plan - 3:  ·  Problem: Type 2 diabetes mellitus.   ·  Plan: FS with Insulin and sliding scale.

## 2023-07-19 NOTE — DISCHARGE NOTE PROVIDER - CARE PROVIDER_API CALL
Navin Talavera  Pulmonary Disease  [address] [place] [address]  Phone: [phone]  Fax: [phone]  Follow Up Time:     Primary Care Doctor,   Phone: (   )    -  Fax: (   )    -  Follow Up Time: 1 week

## 2023-07-19 NOTE — PROGRESS NOTE ADULT - SUBJECTIVE AND OBJECTIVE BOX
61 yo man feeling better. Doing well with low flow O2    Vital Signs Last 24 Hrs  T(C): 36.2 (19 Jul 2023 05:11), Max: 36.8 (18 Jul 2023 16:03)  T(F): 97.1 (19 Jul 2023 05:11), Max: 98.2 (18 Jul 2023 16:03)  HR: 64 (19 Jul 2023 08:29) (58 - 78)  BP: 128/80 (19 Jul 2023 05:11) (104/64 - 146/81)  BP(mean): --  RR: 18 (19 Jul 2023 05:11) (18 - 18)  SpO2: 90% (19 Jul 2023 08:29) (90% - 100%)    Parameters below as of 19 Jul 2023 05:11  Patient On (Oxygen Delivery Method): BiPAP/CPAP    MEDICATIONS  (STANDING):  aspirin  chewable 81 milliGRAM(s) Oral daily  azithromycin  IVPB 500 milliGRAM(s) IV Intermittent every 24 hours  dexAMETHasone  Injectable 6 milliGRAM(s) IV Push every 12 hours  dextrose 5%. 1000 milliLiter(s) (50 mL/Hr) IV Continuous <Continuous>  dextrose 5%. 1000 milliLiter(s) (100 mL/Hr) IV Continuous <Continuous>  dextrose 50% Injectable 25 Gram(s) IV Push once  dextrose 50% Injectable 12.5 Gram(s) IV Push once  dextrose 50% Injectable 25 Gram(s) IV Push once  enoxaparin Injectable 40 milliGRAM(s) SubCutaneous every 24 hours  glucagon  Injectable 1 milliGRAM(s) IntraMuscular once  insulin lispro (ADMELOG) corrective regimen sliding scale   SubCutaneous three times a day before meals  multivitamin 1 Tablet(s) Oral daily  pantoprazole    Tablet 40 milliGRAM(s) Oral before breakfast    MEDICATIONS  (PRN):  albuterol/ipratropium for Nebulization 3 milliLiter(s) Nebulizer every 6 hours PRN Shortness of Breath and/or Wheezing  aluminum hydroxide/magnesium hydroxide/simethicone Suspension 30 milliLiter(s) Oral every 4 hours PRN Dyspepsia  calcium carbonate    500 mG (Tums) Chewable 3 Tablet(s) Chew every 6 hours PRN Dyspepsia  dextrose Oral Gel 15 Gram(s) Oral once PRN Blood Glucose LESS THAN 70 milliGRAM(s)/deciliter  senna 2 Tablet(s) Oral at bedtime PRN Constipation  
61 yo man presents to [facility] with SOB/ CORONA. Patient has underlying moderate COPD. Notes that with the weather changes and the smog he had increased issues over the weekend. He is now admitted for exacerbation.    Vital Signs Last 24 Hrs  T(C): 36.4 (17 Jul 2023 15:49), Max: 36.7 (17 Jul 2023 10:48)  T(F): 97.6 (17 Jul 2023 15:49), Max: 98 (17 Jul 2023 10:48)  HR: 95 (17 Jul 2023 17:36) (73 - 95)  BP: 118/64 (17 Jul 2023 15:49) (117/67 - 129/79)  BP(mean): --  RR: 19 (17 Jul 2023 15:49) (19 - 20)  SpO2: 92% (17 Jul 2023 17:36) (91% - 97%)    Parameters below as of 17 Jul 2023 15:49  Patient On (Oxygen Delivery Method): nasal cannula    MEDICATIONS  (STANDING):  aspirin  chewable 81 milliGRAM(s) Oral daily  azithromycin  IVPB 500 milliGRAM(s) IV Intermittent every 24 hours  dexAMETHasone  Injectable 6 milliGRAM(s) IV Push every 12 hours  dextrose 5%. 1000 milliLiter(s) (50 mL/Hr) IV Continuous <Continuous>  dextrose 5%. 1000 milliLiter(s) (100 mL/Hr) IV Continuous <Continuous>  dextrose 50% Injectable 25 Gram(s) IV Push once  dextrose 50% Injectable 12.5 Gram(s) IV Push once  dextrose 50% Injectable 25 Gram(s) IV Push once  enoxaparin Injectable 40 milliGRAM(s) SubCutaneous every 24 hours  glucagon  Injectable 1 milliGRAM(s) IntraMuscular once  insulin lispro (ADMELOG) corrective regimen sliding scale   SubCutaneous three times a day before meals  multivitamin 1 Tablet(s) Oral daily  pantoprazole    Tablet 40 milliGRAM(s) Oral before breakfast  sodium chloride 0.9%. 1000 milliLiter(s) (70 mL/Hr) IV Continuous <Continuous>    MEDICATIONS  (PRN):  albuterol/ipratropium for Nebulization 3 milliLiter(s) Nebulizer every 6 hours PRN Shortness of Breath and/or Wheezing  aluminum hydroxide/magnesium hydroxide/simethicone Suspension 30 milliLiter(s) Oral every 4 hours PRN Dyspepsia  calcium carbonate    500 mG (Tums) Chewable 3 Tablet(s) Chew every 6 hours PRN Dyspepsia  dextrose Oral Gel 15 Gram(s) Oral once PRN Blood Glucose LESS THAN 70 milliGRAM(s)/deciliter  senna 2 Tablet(s) Oral at bedtime PRN Constipation  
Patient is a 62y old  Male who presents with a chief complaint of Shortness of breath (16 Jul 2023 15:49)      SUBJECTIVE/OBJECTIVE:    Without complaints. Patient resting comfortably.     MEDICATIONS  (STANDING):  aspirin  chewable 81 milliGRAM(s) Oral daily  azithromycin  IVPB 500 milliGRAM(s) IV Intermittent every 24 hours  dextrose 5%. 1000 milliLiter(s) (50 mL/Hr) IV Continuous <Continuous>  dextrose 5%. 1000 milliLiter(s) (100 mL/Hr) IV Continuous <Continuous>  dextrose 50% Injectable 25 Gram(s) IV Push once  dextrose 50% Injectable 25 Gram(s) IV Push once  dextrose 50% Injectable 12.5 Gram(s) IV Push once  enoxaparin Injectable 40 milliGRAM(s) SubCutaneous every 24 hours  glucagon  Injectable 1 milliGRAM(s) IntraMuscular once  insulin lispro (ADMELOG) corrective regimen sliding scale   SubCutaneous three times a day before meals  methylPREDNISolone sodium succinate Injectable 40 milliGRAM(s) IV Push every 8 hours  multivitamin 1 Tablet(s) Oral daily  pantoprazole    Tablet 40 milliGRAM(s) Oral before breakfast  sodium chloride 0.9%. 1000 milliLiter(s) (70 mL/Hr) IV Continuous <Continuous>    MEDICATIONS  (PRN):  aluminum hydroxide/magnesium hydroxide/simethicone Suspension 30 milliLiter(s) Oral every 4 hours PRN Dyspepsia  calcium carbonate    500 mG (Tums) Chewable 3 Tablet(s) Chew every 6 hours PRN Dyspepsia  dextrose Oral Gel 15 Gram(s) Oral once PRN Blood Glucose LESS THAN 70 milliGRAM(s)/deciliter  senna 2 Tablet(s) Oral at bedtime PRN Constipation      Allergies    No Known Allergies    Intolerances          Vital Signs Last 24 Hrs  T(C): 36.7 (17 Jul 2023 10:48), Max: 36.8 (16 Jul 2023 18:11)  T(F): 98 (17 Jul 2023 10:48), Max: 98.3 (16 Jul 2023 18:11)  HR: 79 (17 Jul 2023 10:48) (73 - 94)  BP: 118/60 (17 Jul 2023 10:48) (103/69 - 137/77)  BP(mean): --  RR: 19 (17 Jul 2023 10:48) (13 - 20)  SpO2: 91% (17 Jul 2023 10:48) (91% - 97%)    Parameters below as of 17 Jul 2023 10:48  Patient On (Oxygen Delivery Method): nasal cannula        PHYSICAL EXAM:  GENERAL: NAD, well-groomed, well-developed  HEAD:  Atraumatic, Normocephalic  EYES: EOMI, PERRLA, conjunctiva and sclera clear  ENMT: No tonsillar erythema, exudates, or enlargement; Moist mucous membranes, No lesions  NECK: Supple, No JVD, Normal thyroid  NERVOUS SYSTEM:  Alert & Oriented X3; Motor Strength 5/5   CHEST/LUNG: Rhonchi bilaterally; No rales,  wheezing, or rubs  HEART: Regular rate and rhythm; No murmurs, rubs, or gallops  ABDOMEN: Soft, Nontender, Nondistended; Bowel sounds present; + Ventral hernia   EXTREMITIES:  No clubbing, cyanosis, or edema  SKIN: No rashes or lesions    LABS:                        16.6   7.83  )-----------( 166      ( 17 Jul 2023 06:20 )             55.7     07-17    139  |  103  |  22  ----------------------------<  160<H>  4.3   |  33<H>  |  0.48<L>    Ca    8.1<L>      17 Jul 2023 06:20  Mg     2.0     07-16    TPro  6.3  /  Alb  2.7<L>  /  TBili  0.3  /  DBili  x   /  AST  15  /  ALT  24  /  AlkPhos  46  07-17      Urinalysis Basic - ( 17 Jul 2023 06:20 )    Color: x / Appearance: x / SG: x / pH: x  Gluc: 160 mg/dL / Ketone: x  / Bili: x / Urobili: x   Blood: x / Protein: x / Nitrite: x   Leuk Esterase: x / RBC: x / WBC x   Sq Epi: x / Non Sq Epi: x / Bacteria: x      CAPILLARY BLOOD GLUCOSE      POCT Blood Glucose.: 333 mg/dL (17 Jul 2023 11:32)  POCT Blood Glucose.: 176 mg/dL (17 Jul 2023 08:05)  POCT Blood Glucose.: 215 mg/dL (16 Jul 2023 21:55)  POCT Blood Glucose.: 165 mg/dL (16 Jul 2023 16:04)          RADIOLOGY & ADDITIONAL TESTS:        Consultant(s) Notes Reviewed:  [ ] YES  [ ] NO  
63 yo man with severe COPD now on low flow oxygen.     Vital Signs Last 24 Hrs  T(C): 36.7 (18 Jul 2023 11:17), Max: 36.9 (18 Jul 2023 00:17)  T(F): 98 (18 Jul 2023 11:17), Max: 98.5 (18 Jul 2023 00:17)  HR: 73 (18 Jul 2023 11:17) (63 - 95)  BP: 127/73 (18 Jul 2023 11:17) (118/64 - 138/85)  BP(mean): --  RR: 17 (18 Jul 2023 11:17) (17 - 19)  SpO2: 92% (18 Jul 2023 11:17) (92% - 98%)    Parameters below as of 18 Jul 2023 11:17  Patient On (Oxygen Delivery Method): nasal cannula    MEDICATIONS  (STANDING):  aspirin  chewable 81 milliGRAM(s) Oral daily  azithromycin  IVPB 500 milliGRAM(s) IV Intermittent every 24 hours  dexAMETHasone  Injectable 6 milliGRAM(s) IV Push every 12 hours  dextrose 5%. 1000 milliLiter(s) (50 mL/Hr) IV Continuous <Continuous>  dextrose 5%. 1000 milliLiter(s) (100 mL/Hr) IV Continuous <Continuous>  dextrose 50% Injectable 25 Gram(s) IV Push once  dextrose 50% Injectable 25 Gram(s) IV Push once  dextrose 50% Injectable 12.5 Gram(s) IV Push once  enoxaparin Injectable 40 milliGRAM(s) SubCutaneous every 24 hours  glucagon  Injectable 1 milliGRAM(s) IntraMuscular once  insulin lispro (ADMELOG) corrective regimen sliding scale   SubCutaneous three times a day before meals  multivitamin 1 Tablet(s) Oral daily  pantoprazole    Tablet 40 milliGRAM(s) Oral before breakfast    MEDICATIONS  (PRN):  albuterol/ipratropium for Nebulization 3 milliLiter(s) Nebulizer every 6 hours PRN Shortness of Breath and/or Wheezing  aluminum hydroxide/magnesium hydroxide/simethicone Suspension 30 milliLiter(s) Oral every 4 hours PRN Dyspepsia  calcium carbonate    500 mG (Tums) Chewable 3 Tablet(s) Chew every 6 hours PRN Dyspepsia  dextrose Oral Gel 15 Gram(s) Oral once PRN Blood Glucose LESS THAN 70 milliGRAM(s)/deciliter  senna 2 Tablet(s) Oral at bedtime PRN Constipation  
Patient is a 62y old  Male who presents with a chief complaint of Shortness of breath (17 Jul 2023 18:35)      SUBJECTIVE/OBJECTIVE:    Without complaints. Patient resting comfortably.     MEDICATIONS  (STANDING):  aspirin  chewable 81 milliGRAM(s) Oral daily  azithromycin  IVPB 500 milliGRAM(s) IV Intermittent every 24 hours  dexAMETHasone  Injectable 6 milliGRAM(s) IV Push every 12 hours  dextrose 5%. 1000 milliLiter(s) (50 mL/Hr) IV Continuous <Continuous>  dextrose 5%. 1000 milliLiter(s) (100 mL/Hr) IV Continuous <Continuous>  dextrose 50% Injectable 25 Gram(s) IV Push once  dextrose 50% Injectable 25 Gram(s) IV Push once  dextrose 50% Injectable 12.5 Gram(s) IV Push once  enoxaparin Injectable 40 milliGRAM(s) SubCutaneous every 24 hours  glucagon  Injectable 1 milliGRAM(s) IntraMuscular once  insulin lispro (ADMELOG) corrective regimen sliding scale   SubCutaneous three times a day before meals  multivitamin 1 Tablet(s) Oral daily  pantoprazole    Tablet 40 milliGRAM(s) Oral before breakfast    MEDICATIONS  (PRN):  albuterol/ipratropium for Nebulization 3 milliLiter(s) Nebulizer every 6 hours PRN Shortness of Breath and/or Wheezing  aluminum hydroxide/magnesium hydroxide/simethicone Suspension 30 milliLiter(s) Oral every 4 hours PRN Dyspepsia  calcium carbonate    500 mG (Tums) Chewable 3 Tablet(s) Chew every 6 hours PRN Dyspepsia  dextrose Oral Gel 15 Gram(s) Oral once PRN Blood Glucose LESS THAN 70 milliGRAM(s)/deciliter  senna 2 Tablet(s) Oral at bedtime PRN Constipation      Allergies    No Known Allergies    Intolerances          Vital Signs Last 24 Hrs  T(C): 36.7 (18 Jul 2023 11:17), Max: 36.9 (18 Jul 2023 00:17)  T(F): 98 (18 Jul 2023 11:17), Max: 98.5 (18 Jul 2023 00:17)  HR: 73 (18 Jul 2023 11:17) (63 - 95)  BP: 127/73 (18 Jul 2023 11:17) (118/64 - 138/85)  BP(mean): --  RR: 17 (18 Jul 2023 11:17) (17 - 19)  SpO2: 92% (18 Jul 2023 11:17) (92% - 98%)    Parameters below as of 18 Jul 2023 11:17  Patient On (Oxygen Delivery Method): nasal cannula        PHYSICAL EXAM:  GENERAL: NAD, well-groomed, well-developed  HEAD:  Atraumatic, Normocephalic  EYES: EOMI, PERRLA, conjunctiva and sclera clear  ENMT: No tonsillar erythema, exudates, or enlargement; Moist mucous membranes, No lesions  NECK: Supple, No JVD, Normal thyroid  NERVOUS SYSTEM:  Alert & Oriented X3; Motor Strength 5/5 B/L upper and lower extremities; DTRs 2+ intact and symmetric  CHEST/LUNG: Clear bilaterally; No rales, rhonchi, wheezing, or rubs  HEART: Regular rate and rhythm; No murmurs, rubs, or gallops  ABDOMEN: Soft, Nontender, Nondistended; Bowel sounds present  EXTREMITIES:  2+ Peripheral Pulses, No clubbing, cyanosis, or edema  LYMPH: No lymphadenopathy noted  SKIN: No rashes or lesions    LABS:                        17.2   9.10  )-----------( 162      ( 18 Jul 2023 07:05 )             54.3     07-18    134<L>  |  100  |  20  ----------------------------<  185<H>  4.9   |  33<H>  |  0.57    Ca    8.2<L>      18 Jul 2023 07:05    TPro  6.3  /  Alb  2.7<L>  /  TBili  0.3  /  DBili  x   /  AST  15  /  ALT  24  /  AlkPhos  46  07-17      Urinalysis Basic - ( 18 Jul 2023 07:05 )    Color: x / Appearance: x / SG: x / pH: x  Gluc: 185 mg/dL / Ketone: x  / Bili: x / Urobili: x   Blood: x / Protein: x / Nitrite: x   Leuk Esterase: x / RBC: x / WBC x   Sq Epi: x / Non Sq Epi: x / Bacteria: x      CAPILLARY BLOOD GLUCOSE      POCT Blood Glucose.: 245 mg/dL (18 Jul 2023 11:31)  POCT Blood Glucose.: 171 mg/dL (18 Jul 2023 08:13)  POCT Blood Glucose.: 213 mg/dL (17 Jul 2023 21:17)  POCT Blood Glucose.: 189 mg/dL (17 Jul 2023 16:41)          RADIOLOGY & ADDITIONAL TESTS:        Consultant(s) Notes Reviewed:  [ xx] YES  [ ] NO

## 2023-07-24 DIAGNOSIS — Z79.51 LONG TERM (CURRENT) USE OF INHALED STEROIDS: ICD-10-CM

## 2023-07-24 DIAGNOSIS — D72.829 ELEVATED WHITE BLOOD CELL COUNT, UNSPECIFIED: ICD-10-CM

## 2023-07-24 DIAGNOSIS — Z79.84 LONG TERM (CURRENT) USE OF ORAL HYPOGLYCEMIC DRUGS: ICD-10-CM

## 2023-07-24 DIAGNOSIS — Z87.891 PERSONAL HISTORY OF NICOTINE DEPENDENCE: ICD-10-CM

## 2023-07-24 DIAGNOSIS — J44.1 CHRONIC OBSTRUCTIVE PULMONARY DISEASE WITH (ACUTE) EXACERBATION: ICD-10-CM

## 2023-07-24 DIAGNOSIS — I10 ESSENTIAL (PRIMARY) HYPERTENSION: ICD-10-CM

## 2023-07-24 DIAGNOSIS — J96.01 ACUTE RESPIRATORY FAILURE WITH HYPOXIA: ICD-10-CM

## 2023-07-24 DIAGNOSIS — J96.02 ACUTE RESPIRATORY FAILURE WITH HYPERCAPNIA: ICD-10-CM

## 2023-07-24 DIAGNOSIS — Z79.82 LONG TERM (CURRENT) USE OF ASPIRIN: ICD-10-CM

## 2023-07-24 DIAGNOSIS — E11.9 TYPE 2 DIABETES MELLITUS WITHOUT COMPLICATIONS: ICD-10-CM

## 2023-10-09 NOTE — DIETITIAN INITIAL EVALUATION ADULT - OTHER CALCULATIONS
Letter and lab results sent via [facility].     Jacque Fuller MA     Ht (cm):  172.7   Wt (kg):   81.6 (7/26)  BMI:  27.4  IBW:  70 kg  %IBW:  117%  UBW:  stable  %UBW: 100%

## 2023-12-20 NOTE — CONSULT NOTE ADULT - NS ATTEND AMEND GEN_ALL_CORE FT
Pt is a 61 yo M with h/o DM, COPD (2L NC @ home), chronically hypoxic and chronic CO2 retainer. Per pt, COPD has been bad for about 3 weeks, since smoke from [employer] [place] came [place]. Allegedly pt's normal 02 saturation is 94% but recently has been dropping the 70s. Pt reports chest tightness and wheezing, Pt found to be hypoxic and hypercapneic; Rx'd with Steroids, Nebs, Zithromax and placed on BiPAP. Pt with good mentation and breathing comfortably on BiPAP with good air entry and no wheezing. CXR and CT chest: no acute pathology and most recent ABG improvement in pH and pCO2. Dx: Acute on chronic hypoxemia + hypercapnia 2 to COPD exacerbation. Initially pt may be managed on GMF. Cont Steroids, Nebs, Zithromax and may consider a trial off BiPAP. O2 sat in the low 90s acceptable. Consider Pulm consult spoon

## 2024-08-18 NOTE — ED ADULT NURSE NOTE - TOBACCO USE
Occupational Therapy       OBJECTIVE                    ASSESSMENT   Focus Note 8/18: Patient last seen by occupational therapy 8/16/24 but plan of care and goals were not updated. Both were updated this date to reflect current plan of care.         GOALS  Review Date: 8/23/2024  Long Term Goals: (to be met by time of discharge from hospital)  Grooming: Patient will complete grooming tasks in standing and at sink modified independent.  Status: progressing/ongoing  Upper body dressing: Patient will complete upper body dressing in sitting and at bedside modified independent.  Status: progressing/ongoing  Lower body dressing: Patient will complete lower body dressing in sitting and at bedside modified independent.  Status: progressing/ongoing  Toileting: Patient will complete toileting modified independent.  Status: progressing/ongoing  Bathing: Patient will complete bathingmodified independent   Status: progressing/ongoingToilet transfer: Patient will complete toilet transfer with modified independent.   Status: progressing/ongoing  Home setting transfer: Patient will complete home setting transfers with modified independent.   Status: progressing/ongoing    Documented in the chart in the following areas: Assessment/Plan.      Assistant to continue with current plan of care, current goals are appropriate, patient progressing towards set goals        Therapy procedure time and total treatment time can be found documented on the Time Entry flowsheet   Current every day smoker

## 2024-10-09 NOTE — H&P ADULT - NSHPREVIEWOFSYSTEMS_GEN_ALL_CORE
REVIEW OF SYSTEMS:    CONSTITUTIONAL: + fevers or chills  EYES/ENT: No visual changes;  No vertigo or throat pain   NECK: No pain or stiffness  RESPIRATORY: + cough, wheezing, - hemoptysis; + shortness of breath  CARDIOVASCULAR: No chest pain or palpitations  GASTROINTESTINAL: No abdominal or epigastric pain. No nausea, vomiting, or hematemesis; No diarrhea or constipation. No melena or hematochezia.  GENITOURINARY: No dysuria, frequency or hematuria  NEUROLOGICAL: No numbness or weakness  SKIN: No itching, rashes 09-Oct-2024 03:24

## 2025-03-25 NOTE — ED ADULT NURSE NOTE - IN THE PAST 12 MONTHS HAVE YOU USED DRUGS OTHER THAN THOSE REQUIRED FOR MEDICAL REASON?
Ok to give tylenol or ibuprofen as needed for pain or fever, alternate every 3 hours if needed  Ok to try over the counter cough and cold meds  Continue with zyrtec as needed for runny nose  Use flonase daily  Start Montelukast nightly    Will refill albuterol to use as needed  
No